# Patient Record
Sex: FEMALE | Race: AMERICAN INDIAN OR ALASKA NATIVE | Employment: FULL TIME | URBAN - METROPOLITAN AREA
[De-identification: names, ages, dates, MRNs, and addresses within clinical notes are randomized per-mention and may not be internally consistent; named-entity substitution may affect disease eponyms.]

---

## 2017-03-22 ENCOUNTER — TELEPHONE (OUTPATIENT)
Dept: FAMILY MEDICINE CLINIC | Age: 52
End: 2017-03-22

## 2017-03-22 ENCOUNTER — PATIENT OUTREACH (OUTPATIENT)
Dept: FAMILY MEDICINE CLINIC | Age: 52
End: 2017-03-22

## 2017-03-22 DIAGNOSIS — Z12.11 COLON CANCER SCREENING: Primary | ICD-10-CM

## 2017-03-22 DIAGNOSIS — Z12.39 BREAST CANCER SCREENING: Primary | ICD-10-CM

## 2017-03-22 NOTE — PROGRESS NOTES
[facility] Screenings:    Discussed overdue health screenings as CRC, Breast CA and cervical. Mrs. Karthik Farrar agrees to proceed with all three. I have placed referrals for mammogram and colonoscopy and have asked Dr. Lawrence Ray office to schedule a well woman office visit for her pap screening. Mrs. Karthik Farrar does not recall a discussion for FIT testing so we discussed the pros and cons of colonoscopy and FIT and she wishes to proceed with colonoscopy.

## 2017-04-04 ENCOUNTER — TELEPHONE (OUTPATIENT)
Dept: FAMILY MEDICINE CLINIC | Age: 52
End: 2017-04-04

## 2017-04-04 NOTE — TELEPHONE ENCOUNTER
The patients imaging at the [facility] needs to changed from a screening to a diagnostic

## 2017-04-06 NOTE — TELEPHONE ENCOUNTER
Cannot order a diagnostic mammogram without hx of abnormal study or mass.      Antoinette Rios MD  4/6/2017  11:01 AM

## 2017-04-07 ENCOUNTER — TELEPHONE (OUTPATIENT)
Dept: FAMILY MEDICINE CLINIC | Age: 52
End: 2017-04-07

## 2017-04-10 ENCOUNTER — PATIENT OUTREACH (OUTPATIENT)
Dept: FAMILY MEDICINE CLINIC | Age: 52
End: 2017-04-10

## 2017-04-10 NOTE — PROGRESS NOTES
[facility] Health SCreenings:    Was scheduled for health screenings last week but had to cancel due to death in the family. Gave her number to Dr. Josette Mtz office to rechedule nurse visit/colonoscopy, # for scheduling dept for mammogram and she will call Dr. Tila Vargas office to reschedule for cervical cancer screening.

## 2017-05-03 ENCOUNTER — PATIENT OUTREACH (OUTPATIENT)
Dept: FAMILY MEDICINE CLINIC | Age: 52
End: 2017-05-03

## 2017-05-03 NOTE — PROGRESS NOTES
[facility] Screening: Follow up on cervical/colon/breast cancer screenings. Has not made appointments for any. Stated her renovation project should be finishing up this week and her father's head stone that was suddenly missing has been shipped finally. Gave her all numbers to schedule her appointments, appropriate referrals have been placed. We agreed I would call in two weeks to f/u again.

## 2017-05-11 ENCOUNTER — OFFICE VISIT (OUTPATIENT)
Dept: FAMILY MEDICINE CLINIC | Age: 52
End: 2017-05-11

## 2017-05-11 VITALS
OXYGEN SATURATION: 99 % | HEIGHT: 64 IN | WEIGHT: 132 LBS | SYSTOLIC BLOOD PRESSURE: 138 MMHG | BODY MASS INDEX: 22.53 KG/M2 | DIASTOLIC BLOOD PRESSURE: 85 MMHG | TEMPERATURE: 98.1 F | RESPIRATION RATE: 18 BRPM | HEART RATE: 54 BPM

## 2017-05-11 DIAGNOSIS — I10 ESSENTIAL HYPERTENSION: Primary | Chronic | ICD-10-CM

## 2017-05-11 DIAGNOSIS — K08.89 PAIN, DENTAL: ICD-10-CM

## 2017-05-11 NOTE — PROGRESS NOTES
Chief Complaint   Patient presents with    Hypertension     pt states she was in the process of having tooth extraction and blood pressure went up

## 2017-05-11 NOTE — PROGRESS NOTES
Acute Care Visit    Today's Date:  2017   Patient's Name: Chloe Freeman   Patient's :  1965     History:     Chief Complaint   Patient presents with    Hypertension     pt states she was in the process of having tooth extraction and blood pressure went up       Chloe Freeman is a 46 y.o. female presenting for Acute Care. BP elevated during recent tooth extraction she presents today to be evaluated to see if she is stable to proceed with tooth extraction. Hypertension    BP today is at goal and less [id number]. Patients risk factors include: none  Patient is not checking home BP    Reports compliance and denies side effects to medications  Daily exercise and diet are as follows: reports staying active and trying to eat healthy  Weight is stable  Takes the below meds regularly with no side effects.    Last LDL: 86          Past Medical History:   Diagnosis Date    Abnormal mammogram 3/2016    BIRADS 3 right breast    Hypertension     Stroke ([facility] [address] 2016    CVA     Past Surgical History:   Procedure Laterality Date    HX GYN            Social History     Social History    Marital status:      Spouse name: N/A    Number of children: N/A    Years of education: N/A     Social History Main Topics    Smoking status: Former Smoker     Quit date: 1999    Smokeless tobacco: Never Used    Alcohol use No    Drug use: No    Sexual activity: Yes     Partners: Male     Birth control/ protection: Condom     Other Topics Concern    None     Social History Narrative     Family History   Problem Relation Age of Onset    No Known Problems Mother     No Known Problems Father     No Known Problems Sister     No Known Problems Brother     No Known Problems Maternal Aunt     No Known Problems Maternal Uncle     No Known Problems Paternal Aunt     No Known Problems Paternal Uncle     No Known Problems Maternal Grandmother     No Known Problems Paternal Grandmother     No Known Problems Paternal Grandfather     No Known Problems Other      No Known Allergies    Problem List:      Patient Active Problem List   Diagnosis Code    Hypertension I10    Back pain M54.9    Numbness and tingling R20.0, R20.2    [facility]       Medications:     Current Outpatient Prescriptions   Medication Sig    gabapentin (NEURONTIN) 100 mg capsule 1 twice a day for 2 days then 1 three times a day for three days then 2 three times a day for 7 days then 3 three times a day    amitriptyline (ELAVIL) 25 mg tablet Take 1 Tab by mouth nightly.  oxyCODONE-acetaminophen (PERCOCET) 5-325 mg per tablet Take 1 Tab by mouth every four (4) hours as needed for Pain. Max Daily Amount: 6 Tabs.  cyclobenzaprine (FLEXERIL) 10 mg tablet Take 1 Tab by mouth three (3) times daily as needed for Muscle Spasm(s) for up to 285 days.  topiramate (TOPAMAX) 50 mg tablet Take 1 Tab by mouth two (2) times a day.  butalbital-aspirin-caffeine (FIORINAL) capsule Take 1 Cap by mouth every four (4) hours as needed (headache). Max Daily Amount: 6 Caps.  metoprolol tartrate (LOPRESSOR) 100 mg IR tablet Take 1 Tab by mouth two (2) times a day.  amLODIPine (NORVASC) 10 mg tablet Take 1 Tab by mouth daily. Indications: HYPERTENSION    hydrochlorothiazide (HYDRODIURIL) 12.5 mg tablet Take 1 Tab by mouth daily. Dosage unknown    lisinopril (PRINIVIL, ZESTRIL) 40 mg tablet Take 1 Tab by mouth daily.  ibuprofen (MOTRIN) 800 mg tablet Take 1 Tab by mouth every eight (8) hours as needed for Pain.  hydrALAZINE (APRESOLINE) 25 mg tablet Take 1 Tab by mouth three (3) times daily.  citalopram (CELEXA) 10 mg tablet Take 1 Tab by mouth daily. No current facility-administered medications for this visit.           Constitutional: negative for fevers, chills, sweats and fatigue  Ears, nose, mouth, throat, and face: negative except for sore throat  Respiratory: negative for cough, sputum or wheezing  Cardiovascular: negative for chest pain, chest pressure/discomfort, dyspnea  Gastrointestinal: negative for nausea, vomiting, diarrhea, constipation and abdominal pain    Physical Assessment:   VS:    Vitals:    05/11/17 1256 05/11/17 1317   BP: (!) 159/94 138/85   Pulse: (!) 54    Resp: 18    Temp: 98.1 °F (36.7 °C)    TempSrc: Oral    SpO2: 99%    Weight: 132 lb (59.9 kg)    Height: 5' 4\" (1.626 m)          Lab Results   Component Value Date/Time    Sodium 140 08/02/2016 02:20 AM    Potassium 3.8 08/02/2016 02:20 AM    Chloride 105 08/02/2016 02:20 AM    CO2 26 08/02/2016 02:20 AM    Anion gap 9 08/02/2016 02:20 AM    Glucose 103 08/02/2016 02:20 AM    BUN 12 08/02/2016 02:20 AM    Creatinine 0.79 08/02/2016 02:20 AM    BUN/Creatinine ratio 15 08/02/2016 02:20 AM    GFR est AA >60 08/02/2016 02:20 AM    GFR est non-AA >60 08/02/2016 02:20 AM    Calcium 8.7 08/02/2016 02:20 AM       General:   Well-groomed, well-nourished, in no distress, pleasant, alert, appropriate and conversant. Mouth:  Good dentition, oropharynx WNL without membranes, exudates, petechiae or ulcers  Neck:   Neck supple, no swelling, mass or tenderness, no thyromegaly  Cardiovasc:   RRR, no MRG. Pulses 2+ and symmetric at distal extremities. Pulmonary:   Lungs clear bilaterally. Normal respiratory effort. Abdomen:   Abdomen soft, NT, ND, NAB. Extremities:   No edema, no TTP bilateral calves. LEs warm and well-perfused. Neuro:   Alert and oriented, no focal deficits. No facial asymmetry noted. Skin:    No rashes or jaundice  Psych:  No pressured speech or abnormal thought content        Assessment/Plan & Orders:       1. Essential hypertension    2. Pain, dental        No orders of the defined types were placed in this encounter. HTN during recent tooth extraction patient had elevated -190 most likely due to pain. BP is much improved today.  Recommend patient continue with current meds she is cleared for further in clinic dental procedures. Follow up in 2-3 months    *Plan of care reviewed with patient. Patient in agreement with plan and expresses understanding. All questions answered and patient encouraged to call or RTO if further questions or concerns.     Letitia Mata MD  Family Medicine  5/11/2017  1:00 PM

## 2017-05-18 ENCOUNTER — PATIENT OUTREACH (OUTPATIENT)
Dept: FAMILY MEDICINE CLINIC | Age: 52
End: 2017-05-18

## 2017-05-18 NOTE — PROGRESS NOTES
health Screening:    Mrs. Maggie Lozano is scheduled for her 6 month diagnostic mammogram 5/26/17 and will call today to schedule her cervical cancer screening with Dr. Abelardo Proctor and her initial visit with Dr. Kamila Paredes for colon cancer screening. States she is doing well but things are still busy but promises to call for appointments.

## 2017-05-30 ENCOUNTER — PATIENT OUTREACH (OUTPATIENT)
Dept: FAMILY MEDICINE CLINIC | Age: 52
End: 2017-05-30

## 2017-06-01 ENCOUNTER — OFFICE VISIT (OUTPATIENT)
Dept: FAMILY MEDICINE CLINIC | Age: 52
End: 2017-06-01

## 2017-06-01 VITALS
OXYGEN SATURATION: 100 % | BODY MASS INDEX: 22.36 KG/M2 | TEMPERATURE: 97.5 F | HEART RATE: 72 BPM | WEIGHT: 131 LBS | HEIGHT: 64 IN | RESPIRATION RATE: 20 BRPM | SYSTOLIC BLOOD PRESSURE: 191 MMHG | DIASTOLIC BLOOD PRESSURE: 106 MMHG

## 2017-06-01 DIAGNOSIS — M62.830 MUSCLE SPASM OF BACK: ICD-10-CM

## 2017-06-01 DIAGNOSIS — I10 ESSENTIAL HYPERTENSION WITH GOAL BLOOD PRESSURE LESS THAN 140/90: Chronic | ICD-10-CM

## 2017-06-01 DIAGNOSIS — F41.9 ANXIETY: ICD-10-CM

## 2017-06-01 RX ORDER — AMLODIPINE BESYLATE 10 MG/1
10 TABLET ORAL DAILY
Qty: 90 TAB | Refills: 1 | Status: SHIPPED | OUTPATIENT
Start: 2017-06-01 | End: 2017-09-21 | Stop reason: SDUPTHER

## 2017-06-01 RX ORDER — METOPROLOL TARTRATE 100 MG/1
100 TABLET ORAL 2 TIMES DAILY
Qty: 180 TAB | Refills: 1 | Status: SHIPPED | OUTPATIENT
Start: 2017-06-01 | End: 2017-09-21 | Stop reason: SDUPTHER

## 2017-06-01 RX ORDER — LISINOPRIL 40 MG/1
40 TABLET ORAL DAILY
Qty: 90 TAB | Refills: 1 | Status: SHIPPED | OUTPATIENT
Start: 2017-06-01 | End: 2017-09-21 | Stop reason: SDUPTHER

## 2017-06-01 RX ORDER — CYCLOBENZAPRINE HCL 10 MG
10 TABLET ORAL
Qty: 90 TAB | Refills: 1 | Status: SHIPPED | OUTPATIENT
Start: 2017-06-01 | End: 2018-04-18 | Stop reason: SDUPTHER

## 2017-06-01 RX ORDER — CITALOPRAM 10 MG/1
10 TABLET ORAL DAILY
Qty: 90 TAB | Refills: 1 | Status: SHIPPED | OUTPATIENT
Start: 2017-06-01 | End: 2018-04-18 | Stop reason: SDUPTHER

## 2017-06-01 RX ORDER — HYDROCHLOROTHIAZIDE 12.5 MG/1
12.5 TABLET ORAL DAILY
Qty: 90 TAB | Refills: 1 | Status: SHIPPED | OUTPATIENT
Start: 2017-06-01 | End: 2017-07-19 | Stop reason: DRUGHIGH

## 2017-06-01 RX ORDER — HYDRALAZINE HYDROCHLORIDE 25 MG/1
25 TABLET, FILM COATED ORAL 3 TIMES DAILY
Qty: 270 TAB | Refills: 1 | Status: SHIPPED | OUTPATIENT
Start: 2017-06-01 | End: 2017-09-21 | Stop reason: SDUPTHER

## 2017-06-01 NOTE — PROGRESS NOTES
Chronic Care Visit    Today's Date:  2017   Patient's Name: Jane Tovar   Patient's :  1965     History:     Chief Complaint   Patient presents with    Medication Reaction     pt here with c/o allergic reaction to an antibotic Amoxicillin, had noted a rash to her face, headache, abd pain and nausea has noted since Monday has been on medication for 7 days       Jane Tovar is a 46 y.o. female presenting for Chronic Care    Hypertension    BP today is not at goal and less [id number] no improvements on recheck. Patients risk factors include: none. Patient reports having an argument with roommate this am and thinks that may have elevated her BP. Patient is not checking home BP    Reports compliance and denies side effects to medications  Daily exercise and diet are as follows: reports staying active and trying to eat healthy  Weight is stable  Takes the below meds regularly with no side effects. Last LDL: 86    Depression/Anxiety    Patient denies new stressor  Sypmtoms are stable since last visit. Patient is currently on the following for depression and doing well: celexa. Denies side effects. Patient is not in counseling. Denies Any thoughts of harming herself or others. Denies excessive drug or alcohol use. Patient reports a supportive and safe home environment. Chronic Back Pain    Onset: 6-12 months  Hx of myalgia  Denies history of trauma/injury  Character of Pain: achy/muscle spasms  Denies problem with bowels or urine  Alleviating/Agravating Factors: worse with excessive activity.    Current meds: stable on flexeril prn    Past Medical History:   Diagnosis Date    Abnormal mammogram 3/2016    BIRADS 3 right breast    Hypertension     Stroke [facility]) 2016    CVA     Past Surgical History:   Procedure Laterality Date    HX GYN            Social History     Social History    Marital status:      Spouse name: N/A    Number of children: N/A    Years of education: N/A     Social History Main Topics    Smoking status: Former Smoker     Quit date: 11/16/1999    Smokeless tobacco: Never Used    Alcohol use No    Drug use: No    Sexual activity: Yes     Partners: Male     Birth control/ protection: Condom     Other Topics Concern    None     Social History Narrative     Family History   Problem Relation Age of Onset    No Known Problems Mother     No Known Problems Father     No Known Problems Sister     No Known Problems Brother     No Known Problems Maternal Aunt     No Known Problems Maternal Uncle     No Known Problems Paternal Aunt     No Known Problems Paternal Uncle     No Known Problems Maternal Grandmother     No Known Problems Paternal Grandmother     No Known Problems Paternal Grandfather     No Known Problems Other      No Known Allergies    Problem List:      Patient Active Problem List   Diagnosis Code    Hypertension I10    Back pain M54.9    Numbness and tingling R20.0, R20.2    [facility]       Medications:     Current Outpatient Prescriptions   Medication Sig    amLODIPine (NORVASC) 10 mg tablet Take 1 Tab by mouth daily. Indications: hypertension    hydroCHLOROthiazide (HYDRODIURIL) 12.5 mg tablet Take 1 Tab by mouth daily. Dosage unknown    cyclobenzaprine (FLEXERIL) 10 mg tablet Take 1 Tab by mouth three (3) times daily as needed for Muscle Spasm(s) for up to 285 days.  metoprolol tartrate (LOPRESSOR) 100 mg IR tablet Take 1 Tab by mouth two (2) times a day.  lisinopril (PRINIVIL, ZESTRIL) 40 mg tablet Take 1 Tab by mouth daily.  citalopram (CELEXA) 10 mg tablet Take 1 Tab by mouth daily.  hydrALAZINE (APRESOLINE) 25 mg tablet Take 1 Tab by mouth three (3) times daily.  amitriptyline (ELAVIL) 25 mg tablet Take 1 Tab by mouth nightly.  butalbital-aspirin-caffeine (FIORINAL) capsule Take 1 Cap by mouth every four (4) hours as needed (headache). Max Daily Amount: 6 Caps.     gabapentin (NEURONTIN) 100 mg capsule 1 twice a day for 2 days then 1 three times a day for three days then 2 three times a day for 7 days then 3 three times a day    oxyCODONE-acetaminophen (PERCOCET) 5-325 mg per tablet Take 1 Tab by mouth every four (4) hours as needed for Pain. Max Daily Amount: 6 Tabs.  topiramate (TOPAMAX) 50 mg tablet Take 1 Tab by mouth two (2) times a day.  ibuprofen (MOTRIN) 800 mg tablet Take 1 Tab by mouth every eight (8) hours as needed for Pain. No current facility-administered medications for this visit. Constitutional: negative for fevers, chills, sweats and fatigue  Ears, nose, mouth, throat, and face: negative except for sore throat  Respiratory: negative for cough, sputum or wheezing  Cardiovascular: negative for chest pain, chest pressure/discomfort, dyspnea  Gastrointestinal: negative for nausea, vomiting, diarrhea, constipation and abdominal pain   Neuro: positive for headache negative for dizziness  Musculo: negative myalgia or arthalgia    Physical Assessment:   VS:    Vitals:    06/01/17 0900   BP: (!) 191/106   Pulse: 72   Resp: 20   Temp: 97.5 °F (36.4 °C)   TempSrc: Oral   SpO2: 100%   Weight: 131 lb (59.4 kg)   Height: 5' 4\" (1.626 m)         Lab Results   Component Value Date/Time    Sodium 140 08/02/2016 02:20 AM    Potassium 3.8 08/02/2016 02:20 AM    Chloride 105 08/02/2016 02:20 AM    CO2 26 08/02/2016 02:20 AM    Anion gap 9 08/02/2016 02:20 AM    Glucose 103 08/02/2016 02:20 AM    BUN 12 08/02/2016 02:20 AM    Creatinine 0.79 08/02/2016 02:20 AM    BUN/Creatinine ratio 15 08/02/2016 02:20 AM    GFR est AA >60 08/02/2016 02:20 AM    GFR est non-AA >60 08/02/2016 02:20 AM    Calcium 8.7 08/02/2016 02:20 AM       General:   Well-groomed, well-nourished, in no distress, pleasant, alert, appropriate and conversant.    Mouth:  Good dentition, oropharynx WNL without membranes, exudates, petechiae or ulcers  Neck:   Neck supple, no swelling, mass or tenderness, no thyromegaly  Cardiovasc:   RRR, no MRG. Pulses 2+ and symmetric at distal extremities. Pulmonary:   Lungs clear bilaterally. Normal respiratory effort. Abdomen:   Abdomen soft, NT, ND, NAB. Extremities:   No edema, no TTP bilateral calves. LEs warm and well-perfused. Neuro:   Alert and oriented, no focal deficits. No facial asymmetry noted. Skin:    No rashes or jaundice  Psych:  No pressured speech or abnormal thought content        Assessment/Plan & Orders:       1. Essential hypertension with goal blood pressure less than 140/90    2. Muscle spasm of back    3. Anxiety        Orders Placed This Encounter    LIPID PANEL    METABOLIC PANEL, COMPREHENSIVE    amLODIPine (NORVASC) 10 mg tablet    hydroCHLOROthiazide (HYDRODIURIL) 12.5 mg tablet    cyclobenzaprine (FLEXERIL) 10 mg tablet    metoprolol tartrate (LOPRESSOR) 100 mg IR tablet    lisinopril (PRINIVIL, ZESTRIL) 40 mg tablet    citalopram (CELEXA) 10 mg tablet    hydrALAZINE (APRESOLINE) 25 mg tablet       HTN BP very elevated no improvements on recheck. Patient reports compliance discussed side effects of uncontrolled BP. Recommend return in 1 week for nurse visit BP check. BP was at goal last visit so hopefully this is an isolated reading. Myalgia will refill flexeril  Depression/Anxiety will refill celexa    Follow up in 2-3 months Chronic care    *Plan of care reviewed with patient. Patient in agreement with plan and expresses understanding. All questions answered and patient encouraged to call or RTO if further questions or concerns.     Ronit Wiseman MD  Family Medicine  6/1/2017  9:10 AM

## 2017-06-01 NOTE — PATIENT INSTRUCTIONS

## 2017-06-01 NOTE — MR AVS SNAPSHOT
Visit Information Date & Time Provider Department Dept. Phone Encounter #  
 6/1/2017  8:45 AM Vishal Stringer [phone] [id number] Follow-up Instructions Return in about 4 weeks (around 6/29/2017), or if symptoms worsen or fail to improve. Your Appointments 6/8/2017 12:45 PM  
Nurse Visit with Drew Marlow MD  
[facility]) Appt Note: bp check [address] [facility] [address]  
[facility] [address]  
  
    
 10/5/2017 12:45 PM  
FOLLOW UP EXAM with Drew Marlow MD  
[facility]) Appt Note: 4 month f/u  
 [address] [facility] [address]  
[facility] [address] Upcoming Health Maintenance Date Due DTaP/Tdap/Td series (1 - Tdap) 7/27/1986 PAP AKA CERVICAL CYTOLOGY 7/27/1986 FOBT Q 1 YEAR AGE 50-75 3/2/2017 BREAST CANCER SCRN MAMMOGRAM 4/7/2017 INFLUENZA AGE 9 TO ADULT 8/1/2017 Allergies as of 6/1/2017  Review Complete On: 6/1/2017 By: Drwe Marlow MD  
 No Known Allergies Current Immunizations  Reviewed on 8/1/2016 No immunizations on file. Not reviewed this visit You Were Diagnosed With   
  
 Codes Comments Essential hypertension with goal blood pressure less than 140/90     ICD-10-CM: I10 
ICD-9-CM: 401.9 Muscle spasm of back     ICD-10-CM: F48.869 ICD-9-CM: 724.8 Anxiety     ICD-10-CM: F41.9 ICD-9-CM: 300.00 Vitals BP Pulse Temp Resp Height(growth percentile) Weight(growth percentile) (!) 191/106 (BP 1 Location: Left arm, BP Patient Position: Sitting) 72 97.5 °F (36.4 °C) (Oral) 20 5' 4\" (1.626 m) 131 lb (59.4 kg) LMP SpO2 BMI OB Status Smoking Status 05/10/2017 100% 22.49 kg/m2 Having regular periods Former Smoker Vitals History BMI and BSA Data Body Mass Index Body Surface Area  
 22.49 kg/m 2 1.64 m 2 Preferred Pharmacy Pharmacy Name Phone [facility] [address] [place], [place] [phone] Your Updated Medication List  
  
   
This list is accurate as of: 6/1/17  9:27 AM.  Always use your most recent med list.  
  
  
  
  
 amitriptyline 25 mg tablet Commonly known as:  ELAVIL Take 1 Tab by mouth nightly. amLODIPine 10 mg tablet Commonly known as:  Natty Coop Take 1 Tab by mouth daily. Indications: hypertension  
  
 butalbital-aspirin-caffeine capsule Commonly known as:  Loren Carreno Take 1 Cap by mouth every four (4) hours as needed (headache). Max Daily Amount: 6 Caps. citalopram 10 mg tablet Commonly known as:  Orbie Curls Take 1 Tab by mouth daily. cyclobenzaprine 10 mg tablet Commonly known as:  FLEXERIL Take 1 Tab by mouth three (3) times daily as needed for Muscle Spasm(s) for up to 285 days. gabapentin 100 mg capsule Commonly known as:  NEURONTIN  
1 twice a day for 2 days then 1 three times a day for three days then 2 three times a day for 7 days then 3 three times a day  
  
 hydrALAZINE 25 mg tablet Commonly known as:  APRESOLINE Take 1 Tab by mouth three (3) times daily. hydroCHLOROthiazide 12.5 mg tablet Commonly known as:  HYDRODIURIL Take 1 Tab by mouth daily. Dosage unknown  
  
 ibuprofen 800 mg tablet Commonly known as:  MOTRIN Take 1 Tab by mouth every eight (8) hours as needed for Pain. lisinopril 40 mg tablet Commonly known as:  Emile Apo Take 1 Tab by mouth daily. metoprolol tartrate 100 mg IR tablet Commonly known as:  LOPRESSOR Take 1 Tab by mouth two (2) times a day. oxyCODONE-acetaminophen 5-325 mg per tablet Commonly known as:  PERCOCET Take 1 Tab by mouth every four (4) hours as needed for Pain. Max Daily Amount: 6 Tabs. topiramate 50 mg tablet Commonly known as:  TOPAMAX Take 1 Tab by mouth two (2) times a day. Prescriptions Sent to Pharmacy Refills  
 amLODIPine (NORVASC) 10 mg tablet 1 Sig: Take 1 Tab by mouth daily. Indications: hypertension Class: Normal  
 Pharmacy: [address], [place] [address]. Ph #: [phone] Route: Oral  
 hydroCHLOROthiazide (HYDRODIURIL) 12.5 mg tablet 1 Sig: Take 1 Tab by mouth daily. Dosage unknown Class: Normal  
 Pharmacy: [address], [place] [address]. Ph #: [phone] Route: Oral  
 cyclobenzaprine (FLEXERIL) 10 mg tablet 1 Sig: Take 1 Tab by mouth three (3) times daily as needed for Muscle Spasm(s) for up to 285 days. Class: Normal  
 Pharmacy: [address], [place] [address]. Ph #: [phone] Route: Oral  
 metoprolol tartrate (LOPRESSOR) 100 mg IR tablet 1 Sig: Take 1 Tab by mouth two (2) times a day. Class: Normal  
 Pharmacy: [address], [place] [address]. Ph #: [phone] Route: Oral  
 lisinopril (PRINIVIL, ZESTRIL) 40 mg tablet 1 Sig: Take 1 Tab by mouth daily. Class: Normal  
 Pharmacy: [address], [place] [address]. Ph #: [phone] Route: Oral  
 citalopram (CELEXA) 10 mg tablet 1 Sig: Take 1 Tab by mouth daily. Class: Normal  
 Pharmacy: [address], [place] [address]. Ph #: [phone] Route: Oral  
 hydrALAZINE (APRESOLINE) 25 mg tablet 1 Sig: Take 1 Tab by mouth three (3) times daily. Class: Normal  
 Pharmacy: [facility] [address] [facility], [place] [address].  #: [phone] Route: Oral  
  
Follow-up Instructions Return in about 4 weeks (around 6/29/2017), or if symptoms worsen or fail to improve. To-Do List   
 06/01/2017 Lab:  LIPID PANEL   
  
 06/01/2017   Lab:  METABOLIC PANEL, COMPREHENSIVE   
  
  
 Patient Instructions High Blood Pressure: Care Instructions Your Care Instructions If your blood pressure is usually above 140/90, you have high blood pressure, or hypertension. That means the top number is 140 or higher or the bottom number is 90 or higher, or both. Despite what a lot of people think, high blood pressure usually doesn't cause headaches or make you feel dizzy or lightheaded. It usually has no symptoms. But it does increase your risk for heart attack, stroke, and kidney or eye damage. The higher your blood pressure, the more your risk increases. Your doctor will give you a goal for your blood pressure. Your goal will be based on your health and your age. An example of a goal is to keep your blood pressure below 140/90. Lifestyle changes, such as eating healthy and being active, are always important to help lower blood pressure. You might also take medicine to reach your blood pressure goal. 
Follow-up care is a key part of your treatment and safety. Be sure to make and go to all appointments, and call your doctor if you are having problems. It's also a good idea to know your test results and keep a list of the medicines you take. How can you care for yourself at home? Medical treatment · If you stop taking your medicine, your blood pressure will go back up. You may take one or more types of medicine to lower your blood pressure. Be safe with medicines. Take your medicine exactly as prescribed. Call your doctor if you think you are having a problem with your medicine. · Talk to your doctor before you start taking aspirin every day. Aspirin can help certain people lower their risk of a heart attack or stroke. But taking aspirin isn't right for everyone, because it can cause serious bleeding. · See your doctor regularly. You may need to see the doctor more often at first or until your blood pressure comes down.  
· If you are taking blood pressure medicine, talk to your doctor before you take decongestants or anti-inflammatory medicine, such as ibuprofen. Some of these medicines can raise blood pressure. · Learn how to check your blood pressure at home. Lifestyle changes · Stay at a healthy weight. This is especially important if you put on weight around the waist. Losing even 10 pounds can help you lower your blood pressure. · If your doctor recommends it, get more exercise. Walking is a good choice. Bit by bit, increase the amount you walk every day. Try for at least 30 minutes on most days of the week. You also may want to swim, bike, or do other activities. · Avoid or limit alcohol. Talk to your doctor about whether you can drink any alcohol. · Try to limit how much sodium you eat to less than 2,300 milligrams (mg) a day. Your doctor may ask you to try to eat less than 1,500 mg a day. · Eat plenty of fruits (such as bananas and oranges), vegetables, legumes, whole grains, and low-fat dairy products. · Lower the amount of saturated fat in your diet. Saturated fat is found in animal products such as milk, cheese, and meat. Limiting these foods may help you lose weight and also lower your risk for heart disease. · Do not smoke. Smoking increases your risk for heart attack and stroke. If you need help quitting, talk to your doctor about stop-smoking programs and medicines. These can increase your chances of quitting for good. When should you call for help? Call 911 anytime you think you may need emergency care. This may mean having symptoms that suggest that your blood pressure is causing a serious heart or blood vessel problem. Your blood pressure may be over 180/110. For example, call 911 if: 
· You have symptoms of a heart attack. These may include: ¨ Chest pain or pressure, or a strange feeling in the chest. 
¨ Sweating. ¨ Shortness of breath. ¨ Nausea or vomiting.  
¨ Pain, pressure, or a strange feeling in the back, neck, jaw, or upper belly or in one or both shoulders or arms. ¨ Lightheadedness or sudden weakness. ¨ A fast or irregular heartbeat. · You have symptoms of a stroke. These may include: 
¨ Sudden numbness, tingling, weakness, or loss of movement in your face, arm, or leg, especially on only one side of your body. ¨ Sudden vision changes. ¨ Sudden trouble speaking. ¨ Sudden confusion or trouble understanding simple statements. ¨ Sudden problems with walking or balance. ¨ A sudden, severe headache that is different from past headaches. · You have severe back or belly pain. Do not wait until your blood pressure comes down on its own. Get help right away. Call your doctor now or seek immediate care if: 
· Your blood pressure is much higher than normal (such as 180/110 or higher), but you don't have symptoms. · You think high blood pressure is causing symptoms, such as: ¨ Severe headache. ¨ Blurry vision. Watch closely for changes in your health, and be sure to contact your doctor if: 
· Your blood pressure measures 140/90 or higher at least 2 times. That means the top number is 140 or higher or the bottom number is 90 or higher, or both. · You think you may be having side effects from your blood pressure medicine. · Your blood pressure is usually normal, but it goes above normal at least 2 times. Where can you learn more? Go to http://collette-grecia.info/. Enter S952 in the search box to learn more about \"High Blood Pressure: Care Instructions. \" Current as of: August 8, 2016 Content Version: 11.2 © 1855-0221 [employer]. Care instructions adapted under license by [employer] (which disclaims liability or warranty for this information). If you have questions about a medical condition or this instruction, always ask your healthcare professional. [place] [address] any warranty or liability for your use of this information. Introducing [facility] & HEALTH SERVICES! Dear Ludwig Wong: 
Thank you for requesting a ProxToMe account. Our records indicate that you already have an active ProxToMe account. You can access your account anytime at [email or web address] Did you know that you can access your hospital and ER discharge instructions at any time in ProxToMe? You can also review all of your test results from your hospital stay or ER visit. Additional Information If you have questions, please visit the Frequently Asked Questions section of the ProxToMe website at [email or web address] Remember, ProxToMe is NOT to be used for urgent needs. For medical emergencies, dial 911. Now available from your iPhone and Android! Please provide this summary of care documentation to your next provider. Your primary care clinician is listed as Tenisha Goss. If you have any questions after today's visit, please call [phone].

## 2017-06-02 ENCOUNTER — OFFICE VISIT (OUTPATIENT)
Dept: FAMILY MEDICINE CLINIC | Age: 52
End: 2017-06-02

## 2017-06-02 ENCOUNTER — PATIENT OUTREACH (OUTPATIENT)
Dept: FAMILY MEDICINE CLINIC | Age: 52
End: 2017-06-02

## 2017-06-02 VITALS
TEMPERATURE: 98 F | HEIGHT: 64 IN | DIASTOLIC BLOOD PRESSURE: 84 MMHG | RESPIRATION RATE: 18 BRPM | OXYGEN SATURATION: 99 % | WEIGHT: 129 LBS | SYSTOLIC BLOOD PRESSURE: 143 MMHG | HEART RATE: 57 BPM | BODY MASS INDEX: 22.02 KG/M2

## 2017-06-02 DIAGNOSIS — M54.50 CHRONIC BILATERAL LOW BACK PAIN WITHOUT SCIATICA: Chronic | ICD-10-CM

## 2017-06-02 DIAGNOSIS — G89.29 CHRONIC BILATERAL LOW BACK PAIN WITHOUT SCIATICA: Chronic | ICD-10-CM

## 2017-06-02 RX ORDER — OXYCODONE AND ACETAMINOPHEN 5; 325 MG/1; MG/1
1 TABLET ORAL
Qty: 90 TAB | Refills: 0 | Status: SHIPPED | OUTPATIENT
Start: 2017-06-02 | End: 2017-06-02 | Stop reason: SDUPTHER

## 2017-06-02 RX ORDER — OXYCODONE AND ACETAMINOPHEN 5; 325 MG/1; MG/1
1 TABLET ORAL
Qty: 20 TAB | Refills: 0 | Status: SHIPPED | OUTPATIENT
Start: 2017-06-02 | End: 2017-07-18 | Stop reason: SDUPTHER

## 2017-06-02 NOTE — MR AVS SNAPSHOT
Visit Information Date & Time Provider Department Dept. Phone Encounter #  
 6/2/2017  4:00 PM Amy Nicole MD [facility] [phone] [id number] Your Appointments 6/8/2017 12:45 PM  
Nurse Visit with MD [facility]al [facility]avez [facility]t Note: bp check [address] [facility] [address]  
[facility] [address]  
  
    
 10/5/2017 12:45 PM  
FOLLOW UP EXAM with MD Vishal Chavez [facility] Appt Note: 4 month f/u  
 [address] [facility] [address]  
[facility] [address] Upcoming Health Maintenance Date Due DTaP/Tdap/Td series (1 - Tdap) 7/27/1986 PAP AKA CERVICAL CYTOLOGY 7/27/1986 FOBT Q 1 YEAR AGE 50-75 3/2/2017 BREAST CANCER SCRN MAMMOGRAM 4/7/2017 INFLUENZA AGE 9 TO ADULT 8/1/2017 Allergies as of 6/2/2017  Review Complete On: 6/2/2017 By: Chantelle Bruce No Known Allergies Current Immunizations  Reviewed on 8/1/2016 No immunizations on file. Not reviewed this visit You Were Diagnosed With   
  
 Codes Comments Chronic bilateral low back pain without sciatica     ICD-10-CM: M54.5, G89.29 ICD-9-CM: 724.2, 338.29 Vitals BP Pulse Temp Resp Height(growth percentile) Weight(growth percentile) 143/84 (!) 57 98 °F (36.7 °C) (Oral) 18 5' 4\" (1.626 m) 129 lb (58.5 kg) LMP SpO2 BMI OB Status Smoking Status 05/10/2017 99% 22.14 kg/m2 Having regular periods Former Smoker Vitals History BMI and BSA Data Body Mass Index Body Surface Area  
 22.14 kg/m 2 1.63 m 2 Preferred Pharmacy Pharmacy Name Phone [facility] [address] [place] [address] [phone]. [phone] Your Updated Medication List  
  
   
 This list is accurate as of: 6/2/17  4:28 PM.  Always use your most recent med list.  
  
  
  
  
 amitriptyline 25 mg tablet Commonly known as:  ELAVIL Take 1 Tab by mouth nightly. amLODIPine 10 mg tablet Commonly known as:  Stephania Pineda Take 1 Tab by mouth daily. Indications: hypertension  
  
 butalbital-aspirin-caffeine capsule Commonly known as:  Lester Luis E Take 1 Cap by mouth every four (4) hours as needed (headache). Max Daily Amount: 6 Caps. citalopram 10 mg tablet Commonly known as:  Mack Loop Take 1 Tab by mouth daily. cyclobenzaprine 10 mg tablet Commonly known as:  FLEXERIL Take 1 Tab by mouth three (3) times daily as needed for Muscle Spasm(s) for up to 285 days. gabapentin 100 mg capsule Commonly known as:  NEURONTIN  
1 twice a day for 2 days then 1 three times a day for three days then 2 three times a day for 7 days then 3 three times a day  
  
 hydrALAZINE 25 mg tablet Commonly known as:  APRESOLINE Take 1 Tab by mouth three (3) times daily. hydroCHLOROthiazide 12.5 mg tablet Commonly known as:  HYDRODIURIL Take 1 Tab by mouth daily. Dosage unknown  
  
 ibuprofen 800 mg tablet Commonly known as:  MOTRIN Take 1 Tab by mouth every eight (8) hours as needed for Pain. lisinopril 40 mg tablet Commonly known as:  Sacha Moulder Take 1 Tab by mouth daily. metoprolol tartrate 100 mg IR tablet Commonly known as:  LOPRESSOR Take 1 Tab by mouth two (2) times a day. oxyCODONE-acetaminophen 5-325 mg per tablet Commonly known as:  PERCOCET Take 1 Tab by mouth every eight (8) hours as needed for Pain. Max Daily Amount: 3 Tabs. topiramate 50 mg tablet Commonly known as:  TOPAMAX Take 1 Tab by mouth two (2) times a day. Prescriptions Printed Refills  
 oxyCODONE-acetaminophen (PERCOCET) 5-325 mg per tablet 0 Sig: Take 1 Tab by mouth every eight (8) hours as needed for Pain.  Max Daily Amount: 3 Tabs. Class: Print Route: Oral  
  
Introducing [facility] & HEALTH SERVICES! Dear [employer]: 
Thank you for requesting a Zola Books account. Our records indicate that you already have an active Zola Books account. You can access your account anytime at [email or web address] Did you know that you can access your hospital and ER discharge instructions at any time in Zola Books? You can also review all of your test results from your hospital stay or ER visit. Additional Information If you have questions, please visit the Frequently Asked Questions section of the Zola Books website at [email or web address] Remember, Zola Books is NOT to be used for urgent needs. For medical emergencies, dial 911. Now available from your iPhone and Android! Please provide this summary of care documentation to your next provider. Your primary care clinician is listed as Yaima Corbett. If you have any questions after today's visit, please call [phone].

## 2017-06-02 NOTE — PROGRESS NOTES
Chronic Care Visit    Today's Date:  2017   Patient's Name: Ishmael Mcfarlane   Patient's :  1965     History:     Chief Complaint   Patient presents with    Medication Refill     pt presents for refill for pain medication Oxycodone for muscle spasms and headaches        Ishmael Mcfarlane is a 46 y.o. female presenting for Chronic Care    Hypertension    BP today is not at goal and less esdr157/90 but greatly improved since last visit. Patients risk factors include: none. Patient is not checking home BP    Reports compliance and denies side effects to medications  Daily exercise and diet are as follows: reports staying active and trying to eat healthy  Weight is stable  Takes the below meds regularly with no side effects. Last LDL: 86    Depression/Anxiety    Patient denies new stressors  Sypmtoms are stable since last visit. Patient is currently on the following for depression and doing well: celexa. Denies side effects. Patient is not in counseling. Denies Any thoughts of harming herself or others. Denies excessive drug or alcohol use. Patient reports a supportive and safe home environment. Chronic Back Pain    No changes in pain requesting a refill on percocet today. She takes the percocet prn not on a daily basis. Onset: 6-12 months  Hx of myalgia  Denies history of trauma/injury  Character of Pain: achy/muscle spasms  Denies problem with bowels or urine  Alleviating/Agravating Factors: worse with excessive activity.    Current meds: stable on flexeril prn    Past Medical History:   Diagnosis Date    Abnormal mammogram 3/2016    BIRADS 3 right breast    Hypertension     Stroke ([facility] [address] 2016    CVA     Past Surgical History:   Procedure Laterality Date    HX GYN            Social History     Social History    Marital status:      Spouse name: N/A    Number of children: N/A    Years of education: N/A     Social History Main Topics    Smoking status: Former Smoker     Quit date: 11/16/1999    Smokeless tobacco: Never Used    Alcohol use No    Drug use: No    Sexual activity: Yes     Partners: Male     Birth control/ protection: Condom     Other Topics Concern    None     Social History Narrative     Family History   Problem Relation Age of Onset    No Known Problems Mother     No Known Problems Father     No Known Problems Sister     No Known Problems Brother     No Known Problems Maternal Aunt     No Known Problems Maternal Uncle     No Known Problems Paternal Aunt     No Known Problems Paternal Uncle     No Known Problems Maternal Grandmother     No Known Problems Paternal Grandmother     No Known Problems Paternal Grandfather     No Known Problems Other      No Known Allergies    Problem List:      Patient Active Problem List   Diagnosis Code    Hypertension I10    Back pain M54.9    Numbness and tingling R20.0, R20.2    [facility] I63.9       Medications:     Current Outpatient Prescriptions   Medication Sig    oxyCODONE-acetaminophen (PERCOCET) 5-325 mg per tablet Take 1 Tab by mouth every eight (8) hours as needed for Pain. Max Daily Amount: 3 Tabs.  amLODIPine (NORVASC) 10 mg tablet Take 1 Tab by mouth daily. Indications: hypertension    hydroCHLOROthiazide (HYDRODIURIL) 12.5 mg tablet Take 1 Tab by mouth daily. Dosage unknown    cyclobenzaprine (FLEXERIL) 10 mg tablet Take 1 Tab by mouth three (3) times daily as needed for Muscle Spasm(s) for up to 285 days.  metoprolol tartrate (LOPRESSOR) 100 mg IR tablet Take 1 Tab by mouth two (2) times a day.  lisinopril (PRINIVIL, ZESTRIL) 40 mg tablet Take 1 Tab by mouth daily.  citalopram (CELEXA) 10 mg tablet Take 1 Tab by mouth daily.  hydrALAZINE (APRESOLINE) 25 mg tablet Take 1 Tab by mouth three (3) times daily.     gabapentin (NEURONTIN) 100 mg capsule 1 twice a day for 2 days then 1 three times a day for three days then 2 three times a day for 7 days then 3 three times a day    amitriptyline (ELAVIL) 25 mg tablet Take 1 Tab by mouth nightly.  topiramate (TOPAMAX) 50 mg tablet Take 1 Tab by mouth two (2) times a day.  ibuprofen (MOTRIN) 800 mg tablet Take 1 Tab by mouth every eight (8) hours as needed for Pain.  butalbital-aspirin-caffeine (FIORINAL) capsule Take 1 Cap by mouth every four (4) hours as needed (headache). Max Daily Amount: 6 Caps. No current facility-administered medications for this visit. Constitutional: negative for fevers, chills, sweats and fatigue  Ears, nose, mouth, throat, and face: negative except for sore throat  Respiratory: negative for cough, sputum or wheezing  Cardiovascular: negative for chest pain, chest pressure/discomfort, dyspnea  Gastrointestinal: negative for nausea, vomiting, diarrhea, constipation and abdominal pain   Neuro: positive for headache negative for dizziness  Musculo: negative myalgia or arthalgia    Physical Assessment:   VS:    Vitals:    06/02/17 1603   BP: 143/84   Pulse: (!) 57   Resp: 18   Temp: 98 °F (36.7 °C)   TempSrc: Oral   SpO2: 99%   Weight: 129 lb (58.5 kg)   Height: 5' 4\" (1.626 m)         Lab Results   Component Value Date/Time    Sodium 140 08/02/2016 02:20 AM    Potassium 3.8 08/02/2016 02:20 AM    Chloride 105 08/02/2016 02:20 AM    CO2 26 08/02/2016 02:20 AM    Anion gap 9 08/02/2016 02:20 AM    Glucose 103 08/02/2016 02:20 AM    BUN 12 08/02/2016 02:20 AM    Creatinine 0.79 08/02/2016 02:20 AM    BUN/Creatinine ratio 15 08/02/2016 02:20 AM    GFR est AA >60 08/02/2016 02:20 AM    GFR est non-AA >60 08/02/2016 02:20 AM    Calcium 8.7 08/02/2016 02:20 AM       General:   Well-groomed, well-nourished, in no distress, pleasant, alert, appropriate and conversant. Mouth:  Good dentition, oropharynx WNL without membranes, exudates, petechiae or ulcers  Neck:   Neck supple, no swelling, mass or tenderness, no thyromegaly  Cardiovasc:   RRR, no MRG.   Pulses 2+ and symmetric at distal extremities. Pulmonary:   Lungs clear bilaterally. Normal respiratory effort. Abdomen:   Abdomen soft, NT, ND, NAB. Extremities:   No edema, no TTP bilateral calves. LEs warm and well-perfused. Neuro:   Alert and oriented, no focal deficits. No facial asymmetry noted. Skin:    No rashes or jaundice  Psych:  No pressured speech or abnormal thought content        Assessment/Plan & Orders:       1. Chronic bilateral low back pain without sciatica        Orders Placed This Encounter    DISCONTD: oxyCODONE-acetaminophen (PERCOCET) 5-325 mg per tablet    oxyCODONE-acetaminophen (PERCOCET) 5-325 mg per tablet       HTN BP only mildly elevated today and almost at goal   Myalgia flexeril refilled and percocet 7 day supply given  Depression/Anxiety stable celexa    Follow up in 3-6 months    *Plan of care reviewed with patient. Patient in agreement with plan and expresses understanding. All questions answered and patient encouraged to call or RTO if further questions or concerns.     Harvinder Anders MD  Family Medicine  6/2/2017  4:27 PM

## 2017-06-26 ENCOUNTER — OFFICE VISIT (OUTPATIENT)
Dept: FAMILY MEDICINE CLINIC | Age: 52
End: 2017-06-26

## 2017-06-26 ENCOUNTER — HOSPITAL ENCOUNTER (OUTPATIENT)
Dept: GENERAL RADIOLOGY | Age: 52
Discharge: HOME | End: 2017-06-26
Payer: COMMERCIAL

## 2017-06-26 VITALS
WEIGHT: 131.8 LBS | BODY MASS INDEX: 22.5 KG/M2 | RESPIRATION RATE: 18 BRPM | OXYGEN SATURATION: 99 % | SYSTOLIC BLOOD PRESSURE: 167 MMHG | DIASTOLIC BLOOD PRESSURE: 100 MMHG | TEMPERATURE: 97.7 F | HEIGHT: 64 IN | HEART RATE: 76 BPM

## 2017-06-26 DIAGNOSIS — M25.572 TOE JOINT PAIN, LEFT: ICD-10-CM

## 2017-06-26 DIAGNOSIS — M25.572 TOE JOINT PAIN, LEFT: Primary | ICD-10-CM

## 2017-06-26 PROCEDURE — 73660 X-RAY EXAM OF TOE(S): CPT

## 2017-06-26 NOTE — PROGRESS NOTES
Patient: Haroldine Severe MRN: [id number]  SSN: [id number]    YOB: 1965  Age: 46 y.o. Sex: female      Date of Service: 6/26/2017   Provider: Britton Coy   Office Location:   [address] [place] [facility], [address] [place] [address], [address]  Office Phone: [phone]  Office Fax: [phone]        REASON FOR VISIT:   Chief Complaint   Patient presents with    Foot Pain     left foot injury two weeks ago and it is still hurting and has some swelling, has tried OTC medication Motrin that is not helping        VITALS:   Visit Vitals    BP (!) 167/100 (BP 1 Location: Right arm, BP Patient Position: Sitting)    Pulse 76    Temp 97.7 °F (36.5 °C) (Oral)    Resp 18    Ht 5' 4\" (1.626 m)    Wt 131 lb 12.8 oz (59.8 kg)    LMP 06/04/2017 (Exact Date)    SpO2 99%    BMI 22.62 kg/m2       MEDICATIONS:   Current Outpatient Prescriptions   Medication Sig Dispense Refill    oxyCODONE-acetaminophen (PERCOCET) 5-325 mg per tablet Take 1 Tab by mouth every eight (8) hours as needed for Pain. Max Daily Amount: 3 Tabs. 20 Tab 0    amLODIPine (NORVASC) 10 mg tablet Take 1 Tab by mouth daily. Indications: hypertension 90 Tab 1    hydroCHLOROthiazide (HYDRODIURIL) 12.5 mg tablet Take 1 Tab by mouth daily. Dosage unknown 90 Tab 1    cyclobenzaprine (FLEXERIL) 10 mg tablet Take 1 Tab by mouth three (3) times daily as needed for Muscle Spasm(s) for up to 285 days. 90 Tab 1    metoprolol tartrate (LOPRESSOR) 100 mg IR tablet Take 1 Tab by mouth two (2) times a day. 180 Tab 1    lisinopril (PRINIVIL, ZESTRIL) 40 mg tablet Take 1 Tab by mouth daily. 90 Tab 1    citalopram (CELEXA) 10 mg tablet Take 1 Tab by mouth daily. 90 Tab 1    hydrALAZINE (APRESOLINE) 25 mg tablet Take 1 Tab by mouth three (3) times daily.  270 Tab 1    gabapentin (NEURONTIN) 100 mg capsule 1 twice a day for 2 days then 1 three times a day for three days then 2 three times a day for 7 days then 3 three times a day 210 Cap 1    amitriptyline (ELAVIL) 25 mg tablet Take 1 Tab by mouth nightly. 30 Tab 3    topiramate (TOPAMAX) 50 mg tablet Take 1 Tab by mouth two (2) times a day. 60 Tab 3    butalbital-aspirin-caffeine (FIORINAL) capsule Take 1 Cap by mouth every four (4) hours as needed (headache). Max Daily Amount: 6 Caps. 30 Cap 0    ibuprofen (MOTRIN) 800 mg tablet Take 1 Tab by mouth every eight (8) hours as needed for Pain. 180 Tab 0        ALLERGIES:   No Known Allergies     ACTIVE MEDICAL PROBLEMS:  Patient Active Problem List   Diagnosis Code    Hypertension I10    Back pain M54.9    Numbness and tingling R20.0, R20.2    Acute cerebral infarction [facility]) I63.9        MEDICAL/SURGICAL HISTORY:  Past Medical History:   Diagnosis Date    Abnormal mammogram 3/2016    BIRADS 3 right breast    Hypertension     Stroke ([facility] [address] 2016    CVA      Past Surgical History:   Procedure Laterality Date    HX GYN  1982             FAMILY HISTORY:  Family History   Problem Relation Age of Onset    No Known Problems Mother     No Known Problems Father     No Known Problems Sister     No Known Problems Brother     No Known Problems Maternal Aunt     No Known Problems Maternal Uncle     No Known Problems Paternal Aunt     No Known Problems Paternal Uncle     No Known Problems Maternal Grandmother     No Known Problems Paternal Grandmother     No Known Problems Paternal Grandfather     No Known Problems Other         SOCIAL HISTORY:  Social History   Substance Use Topics    Smoking status: Former Smoker     Quit date: 1999    Smokeless tobacco: Never Used    Alcohol use No          HISTORY OF PRESENT ILLNESS:   Guido Travis is a 46 y.o. female who presents to the office for acute care. PCP: Dr. Carmelo Barcenas. Patient complains of left 5th toe pain x about 2 weeks. Reports she stubbed her toe on a wood surface about 2 weeks ago and has been experiencing pain ever since.  Pain is exacerbated by applying any pressure to the toe, especially while walking and trying to wear a closed toed shoe. She has tried ibuprofen, ice, heat, and epsom salt soaks without much relief. Patient also takes percocet regularly for her back, which does help her toe a little bit. Denies any other joint pains or injury. No numbness, tingling, or color change to the digit. REVIEW OF SYSTEMS:  Review of Systems   Constitutional: Negative for chills and fever. Respiratory: Negative for shortness of breath. Cardiovascular: Negative for chest pain. Musculoskeletal: Positive for joint pain ( left toe). PHYSICAL EXAMINATION:  Physical Exam   Constitutional: She is well-developed, well-nourished, and in no distress. Cardiovascular: Normal rate, regular rhythm and normal heart sounds. Pulmonary/Chest: Effort normal and breath sounds normal. She has no wheezes. She has no rales. Musculoskeletal:   Left 5th toe with mild edema, tender to palpation diffusely. Pain with passive ROM. Feet otherwise unremarkable   Skin: Skin is warm and dry. RESULTS:  No results found for this visit on 06/26/17. ASSESSMENT/PLAN:  Kim was seen today for foot pain. Diagnoses and all orders for this visit:    Toe joint pain, left  - Will order x-ray to assess for fracture   - Discussed with patient that whether or not there is a fracture present, management will likely be the same. Advised continued use of NSAIDs, ice, rest, elevation, and advised patient to ledy tape the affected toe to the adjacent toe for stabilization  -     XR 5TH TOE LT MIN 2 V; Future    Follow up Wednesday as scheduled for Well Woman exam   Return sooner PRN    Patient expresses understanding and is agreeable with the above plan.       PATIENT CARE TEAM:   Patient Care Team:  Allan Red MD as PCP - General (Family Practice)  Jonathan Ramirez MD as Consulting Provider (Neurology)       Zahida Goldberg, [address]   June 26, 2017    12:16 PM

## 2017-06-27 NOTE — PROGRESS NOTES
Please let patient know that her x-ray did confirm that her toe is broken. She should ledy tape it to the adjacent toe as we discussed for the next 4 weeks, and schedule a follow up in about 4 weeks to reassess.

## 2017-07-12 ENCOUNTER — PATIENT OUTREACH (OUTPATIENT)
Dept: FAMILY MEDICINE CLINIC | Age: 52
End: 2017-07-12

## 2017-07-12 NOTE — PROGRESS NOTES
NN health screenings:    Ms Anderson Orr stated she plans to complete her f/u for broken toes she succumb to recently. She does have a pap smear scheduled with Dr. Sarah Lora partner on July 19th and plans to reschedule with Dr. Rocio Ravi and have the mammogram during the month of August. Provided my phone number for questions or if I can help expedite the screenings in any way.

## 2017-07-18 ENCOUNTER — OFFICE VISIT (OUTPATIENT)
Dept: FAMILY MEDICINE CLINIC | Age: 52
End: 2017-07-18

## 2017-07-18 VITALS
RESPIRATION RATE: 20 BRPM | HEART RATE: 58 BPM | OXYGEN SATURATION: 99 % | DIASTOLIC BLOOD PRESSURE: 116 MMHG | SYSTOLIC BLOOD PRESSURE: 191 MMHG | WEIGHT: 136 LBS | BODY MASS INDEX: 23.22 KG/M2 | TEMPERATURE: 97.6 F | HEIGHT: 64 IN

## 2017-07-18 DIAGNOSIS — S92.532D: Primary | ICD-10-CM

## 2017-07-18 DIAGNOSIS — R03.0 ELEVATED BLOOD PRESSURE READING: ICD-10-CM

## 2017-07-18 RX ORDER — OXYCODONE AND ACETAMINOPHEN 5; 325 MG/1; MG/1
1 TABLET ORAL
Qty: 20 TAB | Refills: 0 | Status: SHIPPED | OUTPATIENT
Start: 2017-07-18 | End: 2018-04-18

## 2017-07-18 RX ORDER — IBUPROFEN 800 MG/1
800 TABLET ORAL
Qty: 180 TAB | Refills: 0 | Status: SHIPPED | OUTPATIENT
Start: 2017-07-18 | End: 2018-04-18

## 2017-07-18 NOTE — PROGRESS NOTES
Patient: Nathalie Gowers MRN: [id number]  SSN: [id number]    YOB: 1965  Age: 46 y.o. Sex: female      Date of Service: 7/18/2017   Provider: Kenyon Nails   Office Location:   [address] [facility], [address] [place] [address], [address]  Office Phone: [phone]  Office Fax: [phone]        REASON FOR VISIT:   Chief Complaint   Patient presents with    Follow-up     pt presents for follow up for brolken pinky toe on left foot pt states \" that pt have sharp pains in toe when pt walks     Back Pain     pt presents for back pain as well     Medication Refill     Oxycodone and Ibuprofen         VITALS:   Visit Vitals    BP (!) 191/116    Pulse (!) 58    Temp 97.6 °F (36.4 °C) (Oral)    Resp 20    Ht 5' 4\" (1.626 m)    Wt 136 lb (61.7 kg)    LMP 06/04/2017 (Exact Date)    SpO2 99%    BMI 23.34 kg/m2       MEDICATIONS:   Current Outpatient Prescriptions   Medication Sig Dispense Refill    oxyCODONE-acetaminophen (PERCOCET) 5-325 mg per tablet Take 1 Tab by mouth every eight (8) hours as needed for Pain. Max Daily Amount: 3 Tabs. 20 Tab 0    amLODIPine (NORVASC) 10 mg tablet Take 1 Tab by mouth daily. Indications: hypertension 90 Tab 1    hydroCHLOROthiazide (HYDRODIURIL) 12.5 mg tablet Take 1 Tab by mouth daily. Dosage unknown 90 Tab 1    cyclobenzaprine (FLEXERIL) 10 mg tablet Take 1 Tab by mouth three (3) times daily as needed for Muscle Spasm(s) for up to 285 days. 90 Tab 1    metoprolol tartrate (LOPRESSOR) 100 mg IR tablet Take 1 Tab by mouth two (2) times a day. 180 Tab 1    lisinopril (PRINIVIL, ZESTRIL) 40 mg tablet Take 1 Tab by mouth daily. 90 Tab 1    citalopram (CELEXA) 10 mg tablet Take 1 Tab by mouth daily. 90 Tab 1    hydrALAZINE (APRESOLINE) 25 mg tablet Take 1 Tab by mouth three (3) times daily.  270 Tab 1    gabapentin (NEURONTIN) 100 mg capsule 1 twice a day for 2 days then 1 three times a day for three days then 2 three times a day for 7 days then 3 three times a day 210 Cap 1    amitriptyline (ELAVIL) 25 mg tablet Take 1 Tab by mouth nightly. 30 Tab 3    topiramate (TOPAMAX) 50 mg tablet Take 1 Tab by mouth two (2) times a day. 60 Tab 3    butalbital-aspirin-caffeine (FIORINAL) capsule Take 1 Cap by mouth every four (4) hours as needed (headache). Max Daily Amount: 6 Caps. 30 Cap 0    ibuprofen (MOTRIN) 800 mg tablet Take 1 Tab by mouth every eight (8) hours as needed for Pain. 180 Tab 0        ALLERGIES:   No Known Allergies     ACTIVE MEDICAL PROBLEMS:  Patient Active Problem List   Diagnosis Code    Hypertension I10    Back pain M54.9    Numbness and tingling R20.0, R20.2    Acute cerebral infarction [facility]) I63.9        MEDICAL/SURGICAL HISTORY:  Past Medical History:   Diagnosis Date    Abnormal mammogram 3/2016    BIRADS 3 right breast    Hypertension     Stroke ([facility] [address] 2016    CVA      Past Surgical History:   Procedure Laterality Date    HX GYN               FAMILY HISTORY:  Family History   Problem Relation Age of Onset    No Known Problems Mother     No Known Problems Father     No Known Problems Sister     No Known Problems Brother     No Known Problems Maternal Aunt     No Known Problems Maternal Uncle     No Known Problems Paternal Aunt     No Known Problems Paternal Uncle     No Known Problems Maternal Grandmother     No Known Problems Paternal Grandmother     No Known Problems Paternal Grandfather     No Known Problems Other         SOCIAL HISTORY:  Social History   Substance Use Topics    Smoking status: Former Smoker     Quit date: 1999    Smokeless tobacco: Never Used    Alcohol use No            HISTORY OF PRESENT ILLNESS:   Roosevelt Walker is a 46 y.o. female who presents to the office for acute care. PCP: Dr. Ubaldo Becker. Patient presents in follow up of fractured left 5th toe. She was initially seen on  complaining of pain after stubbing her toe.  X-ray confirmed oblique fracture, with mild displacement and no significant angulation. She was advised to rest, ice, and elevate where possible, and ledy tape to the adjacent toe. She has been taking NSAIDs and Percocet for the pain with some relief. She reports her pain ranges from a 6-8, when it was previously a 10, however she still cannot tolerate any kind of closed toe shoe. It is also noted that patient's blood pressure is extremely elevated on intake today. She reports she is feeling fine, other than pain in her toe and back. She suspects BP elevation is due to the pain. She also admits she has not yet taken he blood pressure meds today      REVIEW OF SYSTEMS:  Review of Systems   Constitutional: Negative for chills and fever. Eyes: Negative for blurred vision and double vision. Respiratory: Negative for cough, shortness of breath and wheezing. Cardiovascular: Negative for chest pain and palpitations. Musculoskeletal: Positive for back pain and joint pain ( L 5th toe). Neurological: Negative for headaches. PHYSICAL EXAMINATION:  Physical Exam   Constitutional: She is oriented to person, place, and time and well-developed, well-nourished, and in no distress. Cardiovascular: Normal rate, regular rhythm and normal heart sounds. Exam reveals no gallop and no friction rub. No murmur heard. Pulmonary/Chest: Effort normal and breath sounds normal. She has no wheezes. She has no rales. Musculoskeletal:   left 4th-5th toes ledy taped together. Left 5th toe with slight edema, point tenderness to palpation over distal aspect. Distal neurovascularly intact   Neurological: She is alert and oriented to person, place, and time.         RESULTS:    Results from Hospital Encounter encounter on 06/26/17   XR 5TH TOE LT MIN 2 V   Narrative EXAM: X-ray of the fifth Digit of the Left Foot    INDICATION: Pain    TECHNIQUE: 3 views of the fifth digit of the left foot    COMPARISON: None    FINDINGS:   There is an oblique fracture of the distal articular surface of the fifth  proximal phalanx. There is mild lateral displacement and mild depression of the  articular surface. No significant angulation appreciated. No erosions or  periostitis seen. No lytic or blastic focus identified. The soft tissues are  unremarkable. Impression IMPRESSION:  1. Oblique fracture involving the distal articular surface of the fifth  proximal phalanx. ASSESSMENT/PLAN:  Kim was seen today for follow-up, back pain and medication refill. Diagnoses and all orders for this visit:    Closed non-physeal fracture of distal phalanx of lesser toe of left foot with routine healing, subsequent encounter  - Percocet and ibuprofen refilled today.  reviewed  - Patient advised Percocet will not be a chronic prescription   - Continue with supportive care and buddy taping   - Given persistence of point tenderness at 5 weeks, will refer to ortho for further eval. I discussed with patient that I'm not sure ortho will advise anything different as far as management, but they may be able to recommend a splint or soft orthopedic shoe to help with pain during recovery  Orders:   -     oxyCODONE-acetaminophen (PERCOCET) 5-325 mg per tablet; Take 1 Tab by mouth every eight (8) hours as needed for Pain. Max Daily Amount: 3 Tabs. -     ibuprofen (MOTRIN) 800 mg tablet; Take 1 Tab by mouth every eight (8) hours as needed for Pain.  -     REFERRAL TO ORTHOPEDICS    Elevated blood pressure reading  - Likely multifactorial. Patient has not taken her meds yet today, and is also experiencing acute pain. She will return as scheduled tomorrow for her well woman, and if not dramatically improved after taking her regular meds, will need to adjust antihypertensive regimen. Follow up tomorrow as scheduled  sooner PRN    Patient expresses understanding and is agreeable with the above plan.         PATIENT CARE TEAM:   Patient Care Team:  Jaycee Peña MD as PCP - General (Family Practice)  Shaun Raza MD as Consulting Provider (Neurology)       Kenyon Eckert   July 18, 2017    3:16 PM

## 2017-07-19 ENCOUNTER — OFFICE VISIT (OUTPATIENT)
Dept: FAMILY MEDICINE CLINIC | Age: 52
End: 2017-07-19

## 2017-07-19 VITALS
BODY MASS INDEX: 23.22 KG/M2 | OXYGEN SATURATION: 97 % | HEIGHT: 64 IN | RESPIRATION RATE: 18 BRPM | TEMPERATURE: 97.6 F | DIASTOLIC BLOOD PRESSURE: 101 MMHG | SYSTOLIC BLOOD PRESSURE: 165 MMHG | WEIGHT: 136 LBS | HEART RATE: 60 BPM

## 2017-07-19 DIAGNOSIS — Z01.419 WELL WOMAN EXAM WITH ROUTINE GYNECOLOGICAL EXAM: Primary | ICD-10-CM

## 2017-07-19 DIAGNOSIS — Z12.4 SCREENING FOR CERVICAL CANCER: ICD-10-CM

## 2017-07-19 DIAGNOSIS — N89.8 VAGINAL DISCHARGE: ICD-10-CM

## 2017-07-19 DIAGNOSIS — I10 ESSENTIAL HYPERTENSION: Chronic | ICD-10-CM

## 2017-07-19 RX ORDER — HYDROCHLOROTHIAZIDE 25 MG/1
25 TABLET ORAL DAILY
Qty: 30 TAB | Refills: 3 | Status: SHIPPED | OUTPATIENT
Start: 2017-07-19 | End: 2018-04-18 | Stop reason: SDUPTHER

## 2017-07-19 NOTE — PROGRESS NOTES
Patient: Naomi Brower MRN: [id number]  SSN: [id number]    YOB: 1965  Age: 46 y.o. Sex: female        Date of Service: 7/19/2017   Provider: Kenyon Triana   Office Location:   [address] [place] [facility],  [address], [address]  Office Phone: [phone]  Office Fax: [phone]      REASON FOR VISIT:   Naomi Brower is a 46 y.o. female who presents to the office for her annual well woman exam.     VITALS:   Visit Vitals    BP (!) 165/101    Pulse 60    Temp 97.6 °F (36.4 °C) (Oral)    Resp 18    Ht 5' 4\" (1.626 m)    Wt 136 lb (61.7 kg)    LMP 06/04/2017 (Exact Date)    SpO2 97%    BMI 23.34 kg/m2       MEDICATIONS:   Current Outpatient Prescriptions on File Prior to Visit   Medication Sig Dispense Refill    oxyCODONE-acetaminophen (PERCOCET) 5-325 mg per tablet Take 1 Tab by mouth every eight (8) hours as needed for Pain. Max Daily Amount: 3 Tabs. 20 Tab 0    ibuprofen (MOTRIN) 800 mg tablet Take 1 Tab by mouth every eight (8) hours as needed for Pain. 180 Tab 0    amLODIPine (NORVASC) 10 mg tablet Take 1 Tab by mouth daily. Indications: hypertension 90 Tab 1    hydroCHLOROthiazide (HYDRODIURIL) 12.5 mg tablet Take 1 Tab by mouth daily. Dosage unknown 90 Tab 1    cyclobenzaprine (FLEXERIL) 10 mg tablet Take 1 Tab by mouth three (3) times daily as needed for Muscle Spasm(s) for up to 285 days. 90 Tab 1    metoprolol tartrate (LOPRESSOR) 100 mg IR tablet Take 1 Tab by mouth two (2) times a day. 180 Tab 1    lisinopril (PRINIVIL, ZESTRIL) 40 mg tablet Take 1 Tab by mouth daily. 90 Tab 1    citalopram (CELEXA) 10 mg tablet Take 1 Tab by mouth daily. 90 Tab 1    hydrALAZINE (APRESOLINE) 25 mg tablet Take 1 Tab by mouth three (3) times daily.  270 Tab 1    gabapentin (NEURONTIN) 100 mg capsule 1 twice a day for 2 days then 1 three times a day for three days then 2 three times a day for 7 days then 3 three times a day 210 Cap 1    amitriptyline (ELAVIL) 25 mg tablet Take 1 Tab by mouth nightly. 30 Tab 3    topiramate (TOPAMAX) 50 mg tablet Take 1 Tab by mouth two (2) times a day. 60 Tab 3    butalbital-aspirin-caffeine (FIORINAL) capsule Take 1 Cap by mouth every four (4) hours as needed (headache). Max Daily Amount: 6 Caps. 30 Cap 0     No current facility-administered medications on file prior to visit.          ALLERGIES:   No Known Allergies     PROBLEM LIST:  Patient Active Problem List   Diagnosis Code    Hypertension I10    Back pain M54.9    Numbness and tingling R20.0, R20.2    Acute cerebral infarction ([facility] [address] I63.9        PAST MEDICAL HISTORY:  Past Medical History:   Diagnosis Date    Abnormal mammogram 3/2016    BIRADS 3 right breast    Hypertension     Stroke ([facility] [address] 2016    CVA        SURGICAL HISTORY:  Past Surgical History:   Procedure Laterality Date    HX GYN               FAMILY HISTORY:  Family History   Problem Relation Age of Onset    No Known Problems Mother     No Known Problems Father     No Known Problems Sister     No Known Problems Brother     No Known Problems Maternal Aunt     No Known Problems Maternal Uncle     No Known Problems Paternal Aunt     No Known Problems Paternal Uncle     No Known Problems Maternal Grandmother     No Known Problems Paternal Grandmother     No Known Problems Paternal Grandfather     No Known Problems Other         SOCIAL HISTORY:  Social History     Social History    Marital status:      Spouse name: N/A    Number of children: N/A    Years of education: N/A     Social History Main Topics    Smoking status: Former Smoker     Quit date: 1999    Smokeless tobacco: Never Used    Alcohol use No    Drug use: No    Sexual activity: Yes     Partners: Male     Birth control/ protection: Condom     Other Topics Concern    None     Social History Narrative      OBSTETRIC HISTORY:  OB History     No data available           MENSTRUAL HISTORY:  Age at menarche: 12-13 years  LMP: 7/1/17  Length of Menses: 3 days  Description of Flow: normal  Menstrual Complaints: none    SEXUAL HISTORY:  Sexually active: YES  Number of partners: 1  Sexual partner(s) (male/female/both): male  Contraceptive method: none  History of STIs: none    HEALTH SCREENING HISTORY:  Last pap: 5-7 years ago   Results: normal  History of abnormal pap?: NO    Last mammogram: BI-Rads 3 on 10/7/16, overdue for 6 month follow up  Last DEXA scan: N/A  Last colorectal screening: patient has been referred to Dr. Siddiqui, states she is planning on calling to re-schedule her appointment     HPI:   Here today for well woman exam. No acute concerns, however she was seen yesterday in follow up of her broken toe, and BP was noted to be extremely elevated. She was in a lot of pain at that time, and had not yet taken her blood pressure pills. REVIEW OF SYSTEMS:   Review of Systems   Constitutional: Negative for chills and fever. Respiratory: Negative for cough, shortness of breath and wheezing. Cardiovascular: Negative for chest pain and palpitations. Gastrointestinal: Negative for abdominal pain, nausea and vomiting. Genitourinary: Negative for dysuria, frequency, hematuria and urgency. Breasts: Denies masses, skin changes, nipple discharge  Genitourinary: Denies pelvic pain, dyspareunia, abnormal vaginal bleeding or discharge, urinary frequency, urgency, dysuria, hematuria. PHYSICAL EXAMINATION:   Physical Exam   Constitutional: She is oriented to person, place, and time and well-developed, well-nourished, and in no distress. Cardiovascular: Normal rate, regular rhythm and normal heart sounds. Exam reveals no gallop and no friction rub. No murmur heard. Pulmonary/Chest: Effort normal and breath sounds normal. She has no wheezes. She has no rales. Neurological: She is alert and oriented to person, place, and time. Skin: Skin is warm and dry. No rash noted. Breasts: Symmetric bilaterally without skin or nipple changes, tenderness, or masses. Diffusely fibrocystic. Pelvic: External genitalia - no erythema, rashes, or lesions. Internal - vagina pink rugae without lesions. Scant thin white discharge noted. Cervix - pink, non-friable, os patent with no discharge. No cervical motion tenderness. Bimanual - Uterus and adnexae non-tender. No masses palpated. ASSESSMENT/PLAN:  Kim was seen today for well woman. Diagnoses and all orders for this visit:    Well woman exam with routine gynecological exam    Screening for cervical cancer  -     PAP, LB, RFX HPV QYNJ([id number]); Future    Vaginal discharge  -     NUSWAB VAGINITIS PLUS; Future    Essential hypertension  - BP significantly improved since yesterday, but still not at goal.   - Will increase HCTZ to 25 mg daily   - Patient to call if home BP readings persistently > 140/90  -     hydroCHLOROthiazide (HYDRODIURIL) 25 mg tablet; Take 1 Tab by mouth daily. Follow up with Dr. Emile Street in October as scheduled  Return sooner PRN    Patient expresses understanding and is agreeable with the above plan.       [employer] [place]  7/19/2017   2:08 PM

## 2017-07-22 LAB
A VAGINAE DNA VAG QL NAA+PROBE: NORMAL SCORE
BVAB2 DNA VAG QL NAA+PROBE: NORMAL SCORE
C ALBICANS DNA VAG QL NAA+PROBE: NEGATIVE
C GLABRATA DNA VAG QL NAA+PROBE: NEGATIVE
C TRACH RRNA SPEC QL NAA+PROBE: NEGATIVE
CYTOLOGIST CVX/VAG CYTO: NORMAL
DX ICD CODE: NORMAL
LABCORP, 190119: NORMAL
Lab: NORMAL
MEGA1 DNA VAG QL NAA+PROBE: NORMAL SCORE
N GONORRHOEA RRNA SPEC QL NAA+PROBE: NEGATIVE
OTHER STN SPEC: NORMAL
PATH REPORT.FINAL DX SPEC: NORMAL
PATHOLOGIST CVX/VAG CYTO: NORMAL
RECOM F/U CVX/VAG CYTO: NORMAL
STAT OF ADQ CVX/VAG CYTO-IMP: NORMAL
T VAGINALIS RRNA SPEC QL NAA+PROBE: NEGATIVE

## 2017-08-09 ENCOUNTER — PATIENT OUTREACH (OUTPATIENT)
Dept: FAMILY MEDICINE CLINIC | Age: 52
End: 2017-08-09

## 2017-08-09 NOTE — PROGRESS NOTES
NN health Screenings:    Episode closed - unable to reach patient for f/u on mammogram/CRC screenings as her VM indicates my calls are blocked. Noted cervical cancer screening was done and negative.

## 2017-08-10 ENCOUNTER — OFFICE VISIT (OUTPATIENT)
Dept: ORTHOPEDIC SURGERY | Facility: CLINIC | Age: 52
End: 2017-08-10

## 2017-08-10 VITALS
OXYGEN SATURATION: 100 % | SYSTOLIC BLOOD PRESSURE: 201 MMHG | RESPIRATION RATE: 18 BRPM | HEART RATE: 65 BPM | DIASTOLIC BLOOD PRESSURE: 117 MMHG | WEIGHT: 133 LBS | HEIGHT: 64 IN | TEMPERATURE: 98.3 F | BODY MASS INDEX: 22.71 KG/M2

## 2017-08-10 DIAGNOSIS — M79.672 LEFT FOOT PAIN: ICD-10-CM

## 2017-08-10 DIAGNOSIS — S92.515A NONDISPLACED FRACTURE OF PROXIMAL PHALANX OF LEFT LESSER TOE(S), INITIAL ENCOUNTER FOR CLOSED FRACTURE: Primary | ICD-10-CM

## 2017-08-10 NOTE — PATIENT INSTRUCTIONS
Broken Toe: Care Instructions  Your Care Instructions  You have broken (fractured) a bone in your toe. This kind of fracture does not need a special cast or brace. \"Ledy-taping\" your broken toe to a healthy toe next to it is almost always enough to treat the problem and ease symptoms. The toe may take 4 weeks or more to heal.  You heal best when you take good care of yourself. Eat a variety of healthy foods, and don't smoke. Follow-up care is a key part of your treatment and safety. Be sure to make and go to all appointments, and call your doctor if you are having problems. It's also a good idea to know your test results and keep a list of the medicines you take. How can you care for yourself at home? · Be safe with medicines. Take pain medicines exactly as directed. ¨ If the doctor gave you a prescription medicine for pain, take it as prescribed. ¨ If you are not taking a prescription pain medicine, ask your doctor if you can take an over-the-counter medicine. · If your toe is taped to the toe next to it, your doctor has shown you how to change the tape. Protect the skin by putting something soft, such as felt or foam, between your toes before you tape them together. Never tape the toes together skin-to-skin. Your broken toe may need to be ledy-taped for 2 to 4 weeks to heal.  · Rest and protect your toe. Do not walk on it until you can do so without too much pain. If the doctor has told you to use crutches, use them as instructed. · Put ice or a cold pack on your toe for 10 to 20 minutes at a time. Try to do this every 1 to 2 hours for the next 3 days (when you are awake) or until the swelling goes down. Put a thin cloth between the ice and your skin. · Prop up your foot on a pillow when you ice it or anytime you sit or lie down. Try to keep it above the level of your heart. This will help reduce swelling. · Make sure you go to your follow-up appointments.  Your doctor will need to check that your toe is healing right. When should you call for help? Call your doctor now or seek immediate medical care if:  · You have severe pain. · Your toe is cool or pale or changes color. · You have tingling, weakness, or numbness in your toe. Watch closely for changes in your health, and be sure to contact your doctor if:  · Pain and swelling get worse or are not improving. · You have a new or worse deformity in your toe. Where can you learn more? Go to http://collette-grecia.info/. Enter C978 in the search box to learn more about \"Broken Toe: Care Instructions. \"  Current as of: October 19, 2016  Content Version: 11.3  © 6886-4235 [employer], [employer]. Care instructions adapted under license by Dragon [employer]ail (which disclaims liability or warranty for this information). If you have questions about a medical condition or this instruction, always ask your healthcare professional. [address] any warranty or liability for your use of this information.

## 2017-08-10 NOTE — PROGRESS NOTES
Patient: Naomi Brower                MRN: [id number]       SSN: [id number]  YOB: 1965        AGE: 46 y.o. SEX: female    PCP: Jose Gallegos MD  08/10/17    Chief Complaint   Patient presents with    Foot Pain     Left     HISTORY:  Naomi Brower is a 46 y.o. female who is seen for left foot pain. She states she was walking out of her bedroom and struck her left little toe on a wooden  June 2017. She reports pain in her left little toe. She reports swelling and bruising of her left little toe. She states she has been taking Percoet for pain. She notes pain with standing and walking. Pain Assessment  8/10/2017   Severity of Pain 6   Quality of Pain Throbbing; Jerline Maidens; Aching   Duration of Pain Persistent   Frequency of Pain Constant   Aggravating Factors Bending;Standing;Walking   Limiting Behavior Yes   Relieving Factors Nothing   Result of Injury Yes   Work-Related Injury No   Type of Injury Other (Comment)   Type of Injury Comment hit foot on wooden stand     Occupation, etc:  Ms. Alon Johnson is an  at [employer]. She lives in [place] with her friend. She has three adult children, two sons that live in [facility]. and a daughter in [place]. She has grandchildren and one on the way any day. Current weight is 133 pounds. She is 5'4\" tall. She is hypertensive. She is not diabetic.      Lab Results   Component Value Date/Time    Hemoglobin A1c 6.1 08/02/2016 02:20 AM     Weight Metrics 8/10/2017 7/19/2017 7/18/2017 6/26/2017 6/2/2017 6/1/2017 5/11/2017   Weight 133 lb 136 lb 136 lb 131 lb 12.8 oz 129 lb 131 lb 132 lb   BMI 22.83 kg/m2 23.34 kg/m2 23.34 kg/m2 22.62 kg/m2 22.14 kg/m2 22.49 kg/m2 22.66 kg/m2       Patient Active Problem List   Diagnosis Code    Hypertension I10    Back pain M54.9    Numbness and tingling R20.0, R20.2    Acute cerebral infarction ([facility] [address] I63.9     REVIEW OF SYSTEMS: All Below are Negative except: See HPI   Constitutional: negative for fever, chills, and weight loss. Cardiovascular: negative for chest pain, claudication, leg swelling, SOB, CLOUD   Gastrointestinal: Negative for pain, N/V/C/D, Blood in stool or urine, dysuria,  hematuria, incontinence, pelvic pain. Musculoskeletal: See HPI   Neurological: Negative for dizziness and weakness. Negative for headaches, Visual changes, confusion, seizures   Phychiatric/Behavioral: Negative for depression, memory loss, substance  abuse. Extremities: Negative for hair changes, rash, or skin lesion changes. Hematologic: Negative for bleeding problems, bruising, pallor or swollen lymph  nodes   Peripheral Vascular: No calf pain, no circulation deficits. Social History     Social History    Marital status:      Spouse name: N/A    Number of children: N/A    Years of education: N/A     Occupational History    Not on file. Social History Main Topics    Smoking status: Former Smoker     Quit date: 11/16/1999    Smokeless tobacco: Never Used    Alcohol use No    Drug use: No    Sexual activity: Yes     Partners: Male     Birth control/ protection: Condom     Other Topics Concern    Not on file     Social History Narrative      No Known Allergies   Current Outpatient Prescriptions   Medication Sig    hydroCHLOROthiazide (HYDRODIURIL) 25 mg tablet Take 1 Tab by mouth daily.  oxyCODONE-acetaminophen (PERCOCET) 5-325 mg per tablet Take 1 Tab by mouth every eight (8) hours as needed for Pain. Max Daily Amount: 3 Tabs.  ibuprofen (MOTRIN) 800 mg tablet Take 1 Tab by mouth every eight (8) hours as needed for Pain.  amLODIPine (NORVASC) 10 mg tablet Take 1 Tab by mouth daily. Indications: hypertension    cyclobenzaprine (FLEXERIL) 10 mg tablet Take 1 Tab by mouth three (3) times daily as needed for Muscle Spasm(s) for up to 285 days.  metoprolol tartrate (LOPRESSOR) 100 mg IR tablet Take 1 Tab by mouth two (2) times a day.     lisinopril (PRINIVIL, ZESTRIL) 40 mg tablet Take 1 Tab by mouth daily.  citalopram (CELEXA) 10 mg tablet Take 1 Tab by mouth daily.  hydrALAZINE (APRESOLINE) 25 mg tablet Take 1 Tab by mouth three (3) times daily.  gabapentin (NEURONTIN) 100 mg capsule 1 twice a day for 2 days then 1 three times a day for three days then 2 three times a day for 7 days then 3 three times a day    amitriptyline (ELAVIL) 25 mg tablet Take 1 Tab by mouth nightly.  topiramate (TOPAMAX) 50 mg tablet Take 1 Tab by mouth two (2) times a day.  butalbital-aspirin-caffeine (FIORINAL) capsule Take 1 Cap by mouth every four (4) hours as needed (headache). Max Daily Amount: 6 Caps. No current facility-administered medications for this visit. PHYSICAL EXAMINATION:  Visit Vitals    BP (!) 201/117    Pulse 65    Temp 98.3 °F (36.8 °C) (Oral)    Resp 18    Ht 5' 4\" (1.626 m)    Wt 133 lb (60.3 kg)    SpO2 100%    BMI 22.83 kg/m2      ORTHO EXAMINATION:  Examination Right Ankle/Foot Left Ankle/Foot   Skin Intact Resolving bruise   Swelling - +, left little toe   Dorsiflexion 10 10   Plantarflexion 25 25   Deformity - -   Inversion laxity - -   Anterior drawer - -   Medial tenderness - -   Lateral tenderness - -   Heel cord Intact Intact   Sensation Intact Intact   Bunion - -   Toe nails Normal Normal   Capillary refill Normal Normal   Tenderness, swelling, purplish discoloration over left little toe    RADIOGRAPHS:  XR LEFT FOOT 8/10/17  IMPRESSION:  Three views - Early healing of proximal phalanx fracture of the little toe, no bunion deformity, no heel spur. XR 5TH TOE LEFT 6/26/17  IMPRESSION:  1. Oblique fracture involving the distal articular surface of the fifth proximal phalanx. IMPRESSION:      ICD-10-CM ICD-9-CM    1. Nondisplaced fracture of proximal phalanx of left lesser toe(s), initial encounter for closed fracture S92.515A 826.0 MA CLOSED RX TOE FX      CAST BOOT OR SHOE   2.  Left foot pain M79.672 729.5 AMB POC XRAY, FOOT; COMPLETE, 3+ VIEW      CAST BOOT OR SHOE      CANCELED: AMB POC X-RAY KNEE 3 VIEW     PLAN:  A short fracture walker was applied today. She will follow up in four weeks. She will follow up with her primary care physician for hypertension. She will take OTC NSAIDs for pain.      Scribed by Lashell Wallace ([facility]) as dictated by Faye Masterson MD

## 2017-08-10 NOTE — MR AVS SNAPSHOT
Visit Information Date & Time Provider Department Dept. Phone Encounter #  
 8/10/2017  1:10 PM Mary Jane Whalen MD [facility] [phone] Follow-up Instructions Return in about 6 weeks (around 9/21/2017). Your Appointments 10/5/2017 12:45 PM  
FOLLOW UP EXAM with Jens Agrawal MD  
[address]) Appt Note: 4 month f/u  
 [address] [facility] [id number]  
[facility] [id number] Upcoming Health Maintenance Date Due DTaP/Tdap/Td series (1 - Tdap) 7/27/1986 FOBT Q 1 YEAR AGE 50-75 3/2/2017 BREAST CANCER SCRN MAMMOGRAM 4/7/2017 INFLUENZA AGE 9 TO ADULT 8/1/2017 PAP AKA CERVICAL CYTOLOGY 7/19/2020 Allergies as of 8/10/2017  Review Complete On: 8/10/2017 By: Juarez Torres No Known Allergies Current Immunizations  Reviewed on 8/1/2016 No immunizations on file. Not reviewed this visit You Were Diagnosed With   
  
 Codes Comments Nondisplaced fracture of proximal phalanx of left lesser toe(s), initial encounter for closed fracture    -  Primary ICD-10-CM: I02.197Q ICD-9-CM: 826.0 Left foot pain     ICD-10-CM: R85.380 ICD-9-CM: 729.5 Vitals BP Pulse Temp Resp Height(growth percentile) Weight(growth percentile) (!) 201/117 65 98.3 °F (36.8 °C) (Oral) 18 5' 4\" (1.626 m) 133 lb (60.3 kg) SpO2 BMI OB Status Smoking Status 100% 22.83 kg/m2 Having regular periods Former Smoker BMI and BSA Data Body Mass Index Body Surface Area  
 22.83 kg/m 2 1.65 m 2 Preferred Pharmacy Pharmacy Name Phone [facility] [address] [place] [address] [phone] Your Updated Medication List  
  
   
This list is accurate as of: 8/10/17  1:59 PM.  Always use your most recent med list.  
  
  
  
  
 amitriptyline 25 mg tablet Commonly known as:  ELAVIL Take 1 Tab by mouth nightly. amLODIPine 10 mg tablet Commonly known as:  Chelly Waite Take 1 Tab by mouth daily. Indications: hypertension  
  
 butalbital-aspirin-caffeine capsule Commonly known as:  Myriam Coffeeville Take 1 Cap by mouth every four (4) hours as needed (headache). Max Daily Amount: 6 Caps. citalopram 10 mg tablet Commonly known as:  Demar Hopes Take 1 Tab by mouth daily. cyclobenzaprine 10 mg tablet Commonly known as:  FLEXERIL Take 1 Tab by mouth three (3) times daily as needed for Muscle Spasm(s) for up to 285 days. gabapentin 100 mg capsule Commonly known as:  NEURONTIN  
1 twice a day for 2 days then 1 three times a day for three days then 2 three times a day for 7 days then 3 three times a day  
  
 hydrALAZINE 25 mg tablet Commonly known as:  APRESOLINE Take 1 Tab by mouth three (3) times daily. hydroCHLOROthiazide 25 mg tablet Commonly known as:  HYDRODIURIL Take 1 Tab by mouth daily. ibuprofen 800 mg tablet Commonly known as:  MOTRIN Take 1 Tab by mouth every eight (8) hours as needed for Pain. lisinopril 40 mg tablet Commonly known as:  Veronica Lookout Take 1 Tab by mouth daily. metoprolol tartrate 100 mg IR tablet Commonly known as:  LOPRESSOR Take 1 Tab by mouth two (2) times a day. oxyCODONE-acetaminophen 5-325 mg per tablet Commonly known as:  PERCOCET Take 1 Tab by mouth every eight (8) hours as needed for Pain. Max Daily Amount: 3 Tabs. topiramate 50 mg tablet Commonly known as:  TOPAMAX Take 1 Tab by mouth two (2) times a day. We Performed the Following AMB POC XRAY, FOOT; COMPLETE, 3+ VIEW [53726 CPT(R)] Follow-up Instructions Return in about 6 weeks (around 9/21/2017). Patient Instructions Broken Toe: Care Instructions Your Care Instructions You have broken (fractured) a bone in your toe.  This kind of fracture does not need a special cast or brace. \"Ledy-taping\" your broken toe to a healthy toe next to it is almost always enough to treat the problem and ease symptoms. The toe may take 4 weeks or more to heal. 
You heal best when you take good care of yourself. Eat a variety of healthy foods, and don't smoke. Follow-up care is a key part of your treatment and safety. Be sure to make and go to all appointments, and call your doctor if you are having problems. It's also a good idea to know your test results and keep a list of the medicines you take. How can you care for yourself at home? · Be safe with medicines. Take pain medicines exactly as directed. ¨ If the doctor gave you a prescription medicine for pain, take it as prescribed. ¨ If you are not taking a prescription pain medicine, ask your doctor if you can take an over-the-counter medicine. · If your toe is taped to the toe next to it, your doctor has shown you how to change the tape. Protect the skin by putting something soft, such as felt or foam, between your toes before you tape them together. Never tape the toes together skin-to-skin. Your broken toe may need to be ledy-taped for 2 to 4 weeks to heal. 
· Rest and protect your toe. Do not walk on it until you can do so without too much pain. If the doctor has told you to use crutches, use them as instructed. · Put ice or a cold pack on your toe for 10 to 20 minutes at a time. Try to do this every 1 to 2 hours for the next 3 days (when you are awake) or until the swelling goes down. Put a thin cloth between the ice and your skin. · Prop up your foot on a pillow when you ice it or anytime you sit or lie down. Try to keep it above the level of your heart. This will help reduce swelling. · Make sure you go to your follow-up appointments. Your doctor will need to check that your toe is healing right. When should you call for help? Call your doctor now or seek immediate medical care if: 
· You have severe pain. · Your toe is cool or pale or changes color. · You have tingling, weakness, or numbness in your toe. Watch closely for changes in your health, and be sure to contact your doctor if: 
· Pain and swelling get worse or are not improving. · You have a new or worse deformity in your toe. Where can you learn more? Go to http://collette-grecia.info/. Enter Q350 in the search box to learn more about \"Broken Toe: Care Instructions. \" Current as of: October 19, 2016 Content Version: 11.3 © 9286-9914 [employer]. Care instructions adapted under license by [employer] (which disclaims liability or warranty for this information). If you have questions about a medical condition or this instruction, always ask your healthcare professional. [address] any warranty or liability for your use of this information. Introducing [facility]! Dear [employer]: 
Thank you for requesting a TheDigitel account. Our records indicate that you already have an active TheDigitel account. You can access your account anytime at [email or web address] Chinese Did you know that you can access your hospital and ER discharge instructions at any time in TheDigitel? You can also review all of your test results from your hospital stay or ER visit. Additional Information If you have questions, please visit the Frequently Asked Questions section of the TheDigitel website at [email or web address] Remember, TheDigitel is NOT to be used for urgent needs. For medical emergencies, dial 911. Now available from your iPhone and Android! Please provide this summary of care documentation to your next provider. Your primary care clinician is listed as Jaylyn Harvey. If you have any questions after today's visit, please call [phone].

## 2017-08-18 ENCOUNTER — PATIENT OUTREACH (OUTPATIENT)
Dept: FAMILY MEDICINE CLINIC | Age: 52
End: 2017-08-18

## 2017-08-25 ENCOUNTER — PATIENT OUTREACH (OUTPATIENT)
Dept: FAMILY MEDICINE CLINIC | Age: 52
End: 2017-08-25

## 2017-09-05 ENCOUNTER — PATIENT OUTREACH (OUTPATIENT)
Dept: FAMILY MEDICINE CLINIC | Age: 52
End: 2017-09-05

## 2017-09-05 NOTE — PROGRESS NOTES
NN health screenings:    Noted in [facility] is scheduled with [place], Dr. Estefania [place] nurse, for September 15th in preparation for colonoscopy procedure.

## 2017-09-13 ENCOUNTER — TELEPHONE (OUTPATIENT)
Dept: SURGERY | Age: 52
End: 2017-09-13

## 2017-09-13 NOTE — TELEPHONE ENCOUNTER
LM regarding upcoming visit with Dr. Jessica Rebollar for colon screening. Patient has no insurance. ..

## 2017-09-15 ENCOUNTER — PATIENT OUTREACH (OUTPATIENT)
Dept: FAMILY MEDICINE CLINIC | Age: 52
End: 2017-09-15

## 2017-09-15 NOTE — PROGRESS NOTES
NN health screening:    Noted in chart that patient lost her insurance? Plan to call [facility] to see if they provide diagnostic mammograms for the uninsured.

## 2017-09-19 ENCOUNTER — PATIENT OUTREACH (OUTPATIENT)
Dept: FAMILY MEDICINE CLINIC | Age: 52
End: 2017-09-19

## 2017-09-19 NOTE — PROGRESS NOTES
NN health screening:    Noted in chart Ms Ramirez's appointments for CRC screening and mammogram have been placed on hold until she can \"figure out her insurance fiasco\". Meanwhile we suggested she contact Dr. Jose Elias Samaniego for scripts for lisinopril/metoprolol/amlodpine to take to [facility] and/or [facility] for minimal cost out of pocket to refill these medications temporarily. Ms Anne Vanessa was very appreciative and will try these suggestions tomorrow.

## 2017-09-21 ENCOUNTER — PATIENT OUTREACH (OUTPATIENT)
Dept: FAMILY MEDICINE CLINIC | Age: 52
End: 2017-09-21

## 2017-09-21 DIAGNOSIS — F41.9 ANXIETY: ICD-10-CM

## 2017-09-21 DIAGNOSIS — I10 ESSENTIAL HYPERTENSION WITH GOAL BLOOD PRESSURE LESS THAN 140/90: Chronic | ICD-10-CM

## 2017-09-21 DIAGNOSIS — M62.830 MUSCLE SPASM OF BACK: ICD-10-CM

## 2017-09-21 RX ORDER — LISINOPRIL 40 MG/1
40 TABLET ORAL DAILY
Qty: 90 TAB | Refills: 1 | Status: SHIPPED | OUTPATIENT
Start: 2017-09-21 | End: 2018-04-18 | Stop reason: SDUPTHER

## 2017-09-21 RX ORDER — AMLODIPINE BESYLATE 10 MG/1
10 TABLET ORAL DAILY
Qty: 90 TAB | Refills: 1 | Status: SHIPPED | OUTPATIENT
Start: 2017-09-21 | End: 2018-04-18 | Stop reason: SDUPTHER

## 2017-09-21 RX ORDER — METOPROLOL TARTRATE 100 MG/1
100 TABLET ORAL 2 TIMES DAILY
Qty: 180 TAB | Refills: 1 | Status: SHIPPED | OUTPATIENT
Start: 2017-09-21 | End: 2018-04-18 | Stop reason: SDUPTHER

## 2017-09-21 RX ORDER — HYDRALAZINE HYDROCHLORIDE 25 MG/1
25 TABLET, FILM COATED ORAL 3 TIMES DAILY
Qty: 270 TAB | Refills: 1 | Status: SHIPPED | OUTPATIENT
Start: 2017-09-21 | End: 2018-04-18 | Stop reason: SDUPTHER

## 2017-09-21 NOTE — PROGRESS NOTES
NN health screening:    Email this morning from Dr. Eliceo Abernathy indicates Ms De La Torre can  RX for her medications @ the office and she is more than welcome to make an appointment @ the office as needed. I have called Ms De La Torre to relay this message.

## 2017-10-09 ENCOUNTER — OFFICE VISIT (OUTPATIENT)
Dept: ORTHOPEDIC SURGERY | Facility: CLINIC | Age: 52
End: 2017-10-09

## 2017-10-09 VITALS
SYSTOLIC BLOOD PRESSURE: 199 MMHG | WEIGHT: 140.4 LBS | RESPIRATION RATE: 15 BRPM | TEMPERATURE: 97.8 F | DIASTOLIC BLOOD PRESSURE: 119 MMHG | HEART RATE: 72 BPM | BODY MASS INDEX: 23.97 KG/M2 | HEIGHT: 64 IN

## 2017-10-09 DIAGNOSIS — M79.672 LEFT FOOT PAIN: Primary | ICD-10-CM

## 2017-10-09 DIAGNOSIS — S92.535D CLOSED NONDISPLACED FRACTURE OF DISTAL PHALANX OF LESSER TOE OF LEFT FOOT WITH ROUTINE HEALING, SUBSEQUENT ENCOUNTER: ICD-10-CM

## 2017-10-09 NOTE — MR AVS SNAPSHOT
Visit Information Date & Time Provider Department Dept. Phone Encounter #  
 10/9/2017  4:15 PM Buddy Buerger, PA-C [address] [facility] [phone] Upcoming Health Maintenance Date Due DTaP/Tdap/Td series (1 - Tdap) 7/27/1986 FOBT Q 1 YEAR AGE 50-75 3/2/2017 BREAST CANCER SCRN MAMMOGRAM 4/7/2017 INFLUENZA AGE 9 TO ADULT 8/1/2017 PAP AKA CERVICAL CYTOLOGY 7/19/2020 Allergies as of 10/9/2017  Review Complete On: 10/9/2017 By: Katarzyna Matos LPN No Known Allergies Current Immunizations  Reviewed on 8/1/2016 No immunizations on file. Not reviewed this visit You Were Diagnosed With   
  
 Codes Comments Left foot pain    -  Primary ICD-10-CM: F64.779 ICD-9-CM: 729.5 Closed nondisplaced fracture of distal phalanx of lesser toe of left foot with routine healing, subsequent encounter     ICD-10-CM: X24.945H ICD-9-CM: V54.19 Vitals BP Pulse Temp Resp Height(growth percentile) Weight(growth percentile) (!) 199/119 72 97.8 °F (36.6 °C) 15 5' 4\" (1.626 m) 140 lb 6.4 oz (63.7 kg) BMI OB Status Smoking Status 24.1 kg/m2 Having regular periods Former Smoker Vitals History BMI and BSA Data Body Mass Index Body Surface Area  
 24.1 kg/m 2 1.7 m 2 Preferred Pharmacy Pharmacy Name Phone [facility] [address] [place] [address] [phone] Your Updated Medication List  
  
   
This list is accurate as of: 10/9/17  5:06 PM.  Always use your most recent med list.  
  
  
  
  
 amitriptyline 25 mg tablet Commonly known as:  ELAVIL Take 1 Tab by mouth nightly. amLODIPine 10 mg tablet Commonly known as:  Voncile Christopher Take 1 Tab by mouth daily. Indications: hypertension  
  
 butalbital-aspirin-caffeine capsule Commonly known as:  Audery Province Take 1 Cap by mouth every four (4) hours as needed (headache).  Max Daily Amount: 6 Caps. citalopram 10 mg tablet Commonly known as:  Kellee Eye Take 1 Tab by mouth daily. cyclobenzaprine 10 mg tablet Commonly known as:  FLEXERIL Take 1 Tab by mouth three (3) times daily as needed for Muscle Spasm(s) for up to 285 days. gabapentin 100 mg capsule Commonly known as:  NEURONTIN  
1 twice a day for 2 days then 1 three times a day for three days then 2 three times a day for 7 days then 3 three times a day  
  
 hydrALAZINE 25 mg tablet Commonly known as:  APRESOLINE Take 1 Tab by mouth three (3) times daily. hydroCHLOROthiazide 25 mg tablet Commonly known as:  HYDRODIURIL Take 1 Tab by mouth daily. ibuprofen 800 mg tablet Commonly known as:  MOTRIN Take 1 Tab by mouth every eight (8) hours as needed for Pain. lisinopril 40 mg tablet Commonly known as:  Tameka Escort Take 1 Tab by mouth daily. metoprolol tartrate 100 mg IR tablet Commonly known as:  LOPRESSOR Take 1 Tab by mouth two (2) times a day. oxyCODONE-acetaminophen 5-325 mg per tablet Commonly known as:  PERCOCET Take 1 Tab by mouth every eight (8) hours as needed for Pain. Max Daily Amount: 3 Tabs. topiramate 50 mg tablet Commonly known as:  TOPAMAX Take 1 Tab by mouth two (2) times a day. We Performed the Following AMB POC XRAY, FOOT; COMPLETE, 3+ VIEW [12126 CPT(R)] Introducing [facility]! Dear Brandyn Section: 
Thank you for requesting a OuiCar account. Our records indicate that you already have an active OuiCar account. You can access your account anytime at [email or web address] Did you know that you can access your hospital and ER discharge instructions at any time in OuiCar? You can also review all of your test results from your hospital stay or ER visit. Additional Information If you have questions, please visit the Frequently Asked Questions section of the Autobook Now website at [email or web address] Remember, Autobook Now is NOT to be used for urgent needs. For medical emergencies, dial 911. Now available from your iPhone and Android! Please provide this summary of care documentation to your next provider. Your primary care clinician is listed as Ellwood Richmond. If you have any questions after today's visit, please call [phone].

## 2017-10-09 NOTE — LETTER
NOTIFICATION RETURN TO WORK / SCHOOL 
 
10/9/2017 5:08 PM 
 
Ms. [employer] [address] [place] [id number] To Whom It May Concern: 
 
[employer] is currently under the care of [address] [place] [address]. She will return to work on 10-10-17 full duty with no restrictions. If there are questions or concerns please have the patient contact our office.  
 
 
 
Sincerely, 
 
 
Shyann Crowley PA-C

## 2017-10-09 NOTE — PROGRESS NOTES
HISTORY OF PRESENT ILLNESS:  Eron See returns. She is following for a left little toe fracture that occurred back in June. She was seen under the care of Dr. Jada Shaffer in August and found to have a fracture deformity still present at the distal phalanx of the little toe. She was placed in a short fracture walker. She has been in the walker since. She has some swelling still present over the top of her left little toe, but her pain has improved significantly. She is employed as an . She does a lot of sitting. She is not taking any pain medication at this time. PHYSICAL EXAM:  With the boot removed today, it reveals trace swelling at the IP joint of the left little toe. She has, on effort, minimal flexion and extension of the digit secondary to stiffness. Distal sensation is intact fully to the left lower extremity. Capillary refill is brisk and less than 2 seconds to the left lower extremity. Varus and valgus stressing at the metatarsophalangeal joint, the DIP, and IP reveal no instability but does reproduce a little bit of pain and what the patient reports as stiffness. RADIOGRAPHS:  X-rays today reveal a healed, nondisplaced, fracture of the distal phalanx of the left little toe. PLAN:   The patient is going to wean herself from her short fracture walker. We are going to see her back on a p.r.n. basis. Today, all her questions were answered to her satisfaction. A copy of her x-rays was reviewed and provided.

## 2017-11-06 ENCOUNTER — PATIENT OUTREACH (OUTPATIENT)
Dept: FAMILY MEDICINE CLINIC | Age: 52
End: 2017-11-06

## 2017-11-13 ENCOUNTER — PATIENT OUTREACH (OUTPATIENT)
Dept: FAMILY MEDICINE CLINIC | Age: 52
End: 2017-11-13

## 2017-11-13 NOTE — PROGRESS NOTES
NN health screenings:    still with insurance issues but hopes to hear some good news from her insurance within the next few weeks. We agreed I would call again in January unless I see the results of her repeat mammogram and colonoscopy by then. Verified she has both central scheduling and Dr. Katty Flood phone numbers and she will call to schedule once her insurance is straightened out.

## 2018-02-01 ENCOUNTER — PATIENT OUTREACH (OUTPATIENT)
Dept: FAMILY MEDICINE CLINIC | Age: 53
End: 2018-02-01

## 2018-02-01 NOTE — PROGRESS NOTES
NN health screenings:    Ms Tabitha Rehman is Celia Velásquez dealing with my health insurance. They haven't figured out how to reinstate me and I found out my insurance issues started back in 2017 and now they owe me a refund for all the premiums I paid but I haven't seen that check either\". Ms Tabitha Rehman stated \"don't count me out yet for completing my screenings. I'll get this figured out\". Informed her the referral for mammogram is good until May of this year. The referral for colonoscopy with Dr. Raynette Kawasaki will  in March and to let me know if she needs another referral once she is able to proceed.

## 2018-04-18 ENCOUNTER — TELEPHONE (OUTPATIENT)
Dept: FAMILY MEDICINE CLINIC | Age: 53
End: 2018-04-18

## 2018-04-18 ENCOUNTER — OFFICE VISIT (OUTPATIENT)
Dept: FAMILY MEDICINE CLINIC | Age: 53
End: 2018-04-18

## 2018-04-18 ENCOUNTER — HOSPITAL ENCOUNTER (EMERGENCY)
Age: 53
Discharge: HOME | End: 2018-04-18
Attending: EMERGENCY MEDICINE
Payer: COMMERCIAL

## 2018-04-18 VITALS
WEIGHT: 137 LBS | HEIGHT: 64 IN | SYSTOLIC BLOOD PRESSURE: 231 MMHG | OXYGEN SATURATION: 99 % | HEART RATE: 82 BPM | DIASTOLIC BLOOD PRESSURE: 125 MMHG | TEMPERATURE: 98 F | RESPIRATION RATE: 20 BRPM | BODY MASS INDEX: 23.39 KG/M2

## 2018-04-18 VITALS
TEMPERATURE: 97.8 F | BODY MASS INDEX: 23.05 KG/M2 | HEART RATE: 65 BPM | RESPIRATION RATE: 16 BRPM | WEIGHT: 135 LBS | HEIGHT: 64 IN | DIASTOLIC BLOOD PRESSURE: 103 MMHG | SYSTOLIC BLOOD PRESSURE: 184 MMHG | OXYGEN SATURATION: 99 %

## 2018-04-18 DIAGNOSIS — I10 UNCONTROLLED HYPERTENSION: Primary | ICD-10-CM

## 2018-04-18 DIAGNOSIS — R20.0 NUMBNESS AND TINGLING: ICD-10-CM

## 2018-04-18 DIAGNOSIS — Z12.11 COLON CANCER SCREENING: ICD-10-CM

## 2018-04-18 DIAGNOSIS — M62.830 MUSCLE SPASM OF BACK: ICD-10-CM

## 2018-04-18 DIAGNOSIS — I10 ESSENTIAL HYPERTENSION WITH GOAL BLOOD PRESSURE LESS THAN 140/90: Chronic | ICD-10-CM

## 2018-04-18 DIAGNOSIS — S92.532D: ICD-10-CM

## 2018-04-18 DIAGNOSIS — Z12.39 SCREENING FOR BREAST CANCER: ICD-10-CM

## 2018-04-18 DIAGNOSIS — R20.2 NUMBNESS AND TINGLING: ICD-10-CM

## 2018-04-18 DIAGNOSIS — I10 UNCONTROLLED HYPERTENSION: ICD-10-CM

## 2018-04-18 DIAGNOSIS — I10 HYPERTENSION, UNSPECIFIED TYPE: Primary | ICD-10-CM

## 2018-04-18 DIAGNOSIS — F41.9 ANXIETY: ICD-10-CM

## 2018-04-18 DIAGNOSIS — G43.709 CHRONIC MIGRAINE WITHOUT AURA WITHOUT STATUS MIGRAINOSUS, NOT INTRACTABLE: ICD-10-CM

## 2018-04-18 DIAGNOSIS — R49.0 HOARSENESS: ICD-10-CM

## 2018-04-18 LAB
ANION GAP SERPL CALC-SCNC: 5 MMOL/L (ref 3–18)
BASOPHILS # BLD: 0 K/UL (ref 0–0.06)
BASOPHILS NFR BLD: 0 % (ref 0–3)
BUN SERPL-MCNC: 9 MG/DL (ref 7–18)
BUN/CREAT SERPL: 15 (ref 12–20)
CALCIUM SERPL-MCNC: 8.8 MG/DL (ref 8.5–10.1)
CHLORIDE SERPL-SCNC: 106 MMOL/L (ref 100–108)
CO2 SERPL-SCNC: 27 MMOL/L (ref 21–32)
CREAT SERPL-MCNC: 0.61 MG/DL (ref 0.6–1.3)
DIFFERENTIAL METHOD BLD: ABNORMAL
EOSINOPHIL # BLD: 0.1 K/UL (ref 0–0.4)
EOSINOPHIL NFR BLD: 2 % (ref 0–5)
ERYTHROCYTE [DISTWIDTH] IN BLOOD BY AUTOMATED COUNT: 18.1 % (ref 11.6–14.5)
GLUCOSE SERPL-MCNC: 94 MG/DL (ref 74–99)
HCT VFR BLD AUTO: 29 % (ref 35–45)
HGB BLD-MCNC: 8.9 G/DL (ref 12–16)
LYMPHOCYTES # BLD: 1.5 K/UL (ref 0.8–3.5)
LYMPHOCYTES NFR BLD: 28 % (ref 20–51)
MCH RBC QN AUTO: 20.6 PG (ref 24–34)
MCHC RBC AUTO-ENTMCNC: 30.7 G/DL (ref 31–37)
MCV RBC AUTO: 67.1 FL (ref 74–97)
MONOCYTES # BLD: 0.4 K/UL (ref 0–1)
MONOCYTES NFR BLD: 8 % (ref 2–9)
NEUTS SEG # BLD: 3.3 K/UL (ref 1.8–8)
NEUTS SEG NFR BLD: 62 % (ref 42–75)
PLATELET # BLD AUTO: 276 K/UL (ref 135–420)
PLATELET COMMENTS,PCOM: ABNORMAL
PMV BLD AUTO: 9.9 FL (ref 9.2–11.8)
POTASSIUM SERPL-SCNC: 3 MMOL/L (ref 3.5–5.5)
RBC # BLD AUTO: 4.32 M/UL (ref 4.2–5.3)
RBC MORPH BLD: ABNORMAL
SODIUM SERPL-SCNC: 138 MMOL/L (ref 136–145)
WBC # BLD AUTO: 5.3 K/UL (ref 4.6–13.2)

## 2018-04-18 PROCEDURE — 99284 EMERGENCY DEPT VISIT MOD MDM: CPT

## 2018-04-18 PROCEDURE — 80048 BASIC METABOLIC PNL TOTAL CA: CPT | Performed by: EMERGENCY MEDICINE

## 2018-04-18 PROCEDURE — 85025 COMPLETE CBC W/AUTO DIFF WBC: CPT | Performed by: EMERGENCY MEDICINE

## 2018-04-18 PROCEDURE — 74011250637 HC RX REV CODE- 250/637: Performed by: EMERGENCY MEDICINE

## 2018-04-18 PROCEDURE — 96374 THER/PROPH/DIAG INJ IV PUSH: CPT

## 2018-04-18 PROCEDURE — 93005 ELECTROCARDIOGRAM TRACING: CPT

## 2018-04-18 PROCEDURE — 74011250636 HC RX REV CODE- 250/636: Performed by: EMERGENCY MEDICINE

## 2018-04-18 RX ORDER — POTASSIUM CHLORIDE 1.5 G/1.77G
40 POWDER, FOR SOLUTION ORAL
Status: COMPLETED | OUTPATIENT
Start: 2018-04-18 | End: 2018-04-18

## 2018-04-18 RX ORDER — TOPIRAMATE 50 MG/1
50 TABLET, FILM COATED ORAL 2 TIMES DAILY
Qty: 60 TAB | Refills: 3 | Status: SHIPPED | OUTPATIENT
Start: 2018-04-18 | End: 2019-06-10 | Stop reason: SDUPTHER

## 2018-04-18 RX ORDER — GABAPENTIN 100 MG/1
CAPSULE ORAL
Qty: 210 CAP | Refills: 1 | Status: CANCELLED | OUTPATIENT
Start: 2018-04-18

## 2018-04-18 RX ORDER — AMLODIPINE BESYLATE 10 MG/1
10 TABLET ORAL
Status: COMPLETED | OUTPATIENT
Start: 2018-04-18 | End: 2018-04-18

## 2018-04-18 RX ORDER — LISINOPRIL 20 MG/1
40 TABLET ORAL
Status: COMPLETED | OUTPATIENT
Start: 2018-04-18 | End: 2018-04-18

## 2018-04-18 RX ORDER — HYDROCHLOROTHIAZIDE 25 MG/1
25 TABLET ORAL DAILY
Qty: 30 TAB | Refills: 3 | Status: SHIPPED | OUTPATIENT
Start: 2018-04-18 | End: 2018-09-26 | Stop reason: SDUPTHER

## 2018-04-18 RX ORDER — METOPROLOL TARTRATE 50 MG/1
100 TABLET ORAL EVERY 12 HOURS
Status: DISCONTINUED | OUTPATIENT
Start: 2018-04-18 | End: 2018-04-18 | Stop reason: HOSPADM

## 2018-04-18 RX ORDER — METOPROLOL TARTRATE 50 MG/1
100 TABLET ORAL EVERY 12 HOURS
Status: DISCONTINUED | OUTPATIENT
Start: 2018-04-18 | End: 2018-04-18

## 2018-04-18 RX ORDER — IBUPROFEN 800 MG/1
800 TABLET ORAL
Qty: 90 TAB | Refills: 0 | Status: SHIPPED | OUTPATIENT
Start: 2018-04-18 | End: 2018-06-11 | Stop reason: SDUPTHER

## 2018-04-18 RX ORDER — AMITRIPTYLINE HYDROCHLORIDE 25 MG/1
25 TABLET, FILM COATED ORAL
Qty: 30 TAB | Refills: 3 | Status: CANCELLED | OUTPATIENT
Start: 2018-04-18

## 2018-04-18 RX ORDER — METOPROLOL TARTRATE 100 MG/1
100 TABLET ORAL 2 TIMES DAILY
Qty: 180 TAB | Refills: 1 | Status: SHIPPED | OUTPATIENT
Start: 2018-04-18 | End: 2018-09-26 | Stop reason: SDUPTHER

## 2018-04-18 RX ORDER — LISINOPRIL 40 MG/1
40 TABLET ORAL DAILY
Qty: 90 TAB | Refills: 1 | Status: SHIPPED | OUTPATIENT
Start: 2018-04-18 | End: 2018-09-26 | Stop reason: SDUPTHER

## 2018-04-18 RX ORDER — HYDRALAZINE HYDROCHLORIDE 25 MG/1
25 TABLET, FILM COATED ORAL 3 TIMES DAILY
Qty: 270 TAB | Refills: 1 | Status: SHIPPED | OUTPATIENT
Start: 2018-04-18 | End: 2018-04-30 | Stop reason: DRUGHIGH

## 2018-04-18 RX ORDER — CYCLOBENZAPRINE HCL 10 MG
10 TABLET ORAL
Qty: 30 TAB | Refills: 0 | Status: SHIPPED | OUTPATIENT
Start: 2018-04-18 | End: 2018-06-01

## 2018-04-18 RX ORDER — CITALOPRAM 10 MG/1
10 TABLET ORAL DAILY
Qty: 90 TAB | Refills: 1 | Status: SHIPPED | OUTPATIENT
Start: 2018-04-18 | End: 2018-08-13

## 2018-04-18 RX ORDER — AMLODIPINE BESYLATE 10 MG/1
10 TABLET ORAL DAILY
Qty: 90 TAB | Refills: 1 | Status: SHIPPED | OUTPATIENT
Start: 2018-04-18 | End: 2018-09-26 | Stop reason: SDUPTHER

## 2018-04-18 RX ORDER — IBUPROFEN 800 MG/1
800 TABLET ORAL
Qty: 180 TAB | Refills: 0 | Status: CANCELLED | OUTPATIENT
Start: 2018-04-18

## 2018-04-18 RX ORDER — LABETALOL HCL 20 MG/4 ML
20 SYRINGE (ML) INTRAVENOUS
Status: COMPLETED | OUTPATIENT
Start: 2018-04-18 | End: 2018-04-18

## 2018-04-18 RX ADMIN — LISINOPRIL 40 MG: 20 TABLET ORAL at 15:57

## 2018-04-18 RX ADMIN — LABETALOL 20 MG/4 ML (5 MG/ML) INTRAVENOUS SYRINGE 20 MG: at 17:13

## 2018-04-18 RX ADMIN — METOPROLOL TARTRATE 100 MG: 50 TABLET ORAL at 16:17

## 2018-04-18 RX ADMIN — POTASSIUM CHLORIDE 40 MEQ: 1.5 POWDER, FOR SOLUTION ORAL at 16:16

## 2018-04-18 RX ADMIN — AMLODIPINE BESYLATE 10 MG: 10 TABLET ORAL at 15:57

## 2018-04-18 NOTE — PROGRESS NOTES
HISTORY OF PRESENT ILLNESS  Yessy Stewart is a 46 y.o. female. HPI  Yessy Stewart is a 46 y.o. female who presents to the office today for HTN and Medication. She ran out of all of her medication at least 1 month ago due to an issue with health insurance. HTN- very elevated. She does have headaches occasionally with blurred vision. Denies CP or SOB. She was taking HCTZ, lisinopril, norvasc, metoprolol, and hydralazine. Does not monitor BP at home. Anxiety- was taking celexa 10mg but ran out of medication. Hoarse- today while at work she felt an irritation in her throat and when she went to talk, she had no voice. Denies congestion, cough, URI symptoms. She is not taking any medication for this other than drinking hot coffee. Chief Complaint   Patient presents with    Hypertension     follow up     Medication Refill     pt has been out of all her medications due to no insurance coverage pt has insurance now \"     Hoarse     pt states \" that pt voice just got like this about  few hours ago. pt states \" that pt has no cold or cough throat did feel a little funny this morning but no pian \"        No current outpatient prescriptions on file prior to visit. No current facility-administered medications on file prior to visit.       No Known Allergies  Past Medical History:   Diagnosis Date    Abnormal mammogram 3/2016    BIRADS 3 right breast    Hypertension     Stroke ([facility] [address] 8/1/2016    CVA     History   Smoking Status    Former Smoker    Quit date: 11/16/1999   Smokeless Tobacco    Never Used     History   Alcohol Use No     Family History   Problem Relation Age of Onset    No Known Problems Mother     No Known Problems Father     No Known Problems Sister     No Known Problems Brother     No Known Problems Maternal Aunt     No Known Problems Maternal Uncle     No Known Problems Paternal Aunt     No Known Problems Paternal Uncle     No Known Problems Maternal Grandmother     No Known Problems Paternal Grandmother     No Known Problems Paternal Grandfather     No Known Problems Other      Review of Systems   Constitutional: Negative for chills, fever and malaise/fatigue. HENT: Negative for congestion, ear pain and sore throat. Eyes: Negative for blurred vision and double vision. Respiratory: Negative for cough and shortness of breath. Cardiovascular: Negative for chest pain, palpitations, orthopnea and leg swelling. Gastrointestinal: Negative for abdominal pain, nausea and vomiting. Musculoskeletal: Negative for falls. Skin: Negative for rash. Neurological: Negative for dizziness, tingling, speech change, focal weakness, loss of consciousness and headaches. Endo/Heme/Allergies: Negative for environmental allergies. Does not bruise/bleed easily. Psychiatric/Behavioral: The patient is nervous/anxious. Visit Vitals    BP (!) 231/125    Pulse 82    Temp 98 °F (36.7 °C) (Oral)    Resp 20    Ht 5' 4\" (1.626 m)    Wt 137 lb (62.1 kg)    SpO2 99%    BMI 23.52 kg/m2     Physical Exam   Constitutional: She is oriented to person, place, and time. She appears well-developed and well-nourished. No distress. HENT:   Mouth/Throat: Uvula is midline, oropharynx is clear and moist and mucous membranes are normal.   Neck: Neck supple. No JVD present. No thyromegaly present. Cardiovascular: Normal rate, regular rhythm and normal heart sounds. Pulmonary/Chest: Effort normal and breath sounds normal. No respiratory distress. She has no wheezes. She has no rales. Musculoskeletal: She exhibits no edema. Lymphadenopathy:     She has no cervical adenopathy. Neurological: She is alert and oriented to person, place, and time. Skin: Skin is warm and dry. Psychiatric: She has a normal mood and affect. Her behavior is normal. Thought content normal.   Nursing note and vitals reviewed. ASSESSMENT and PLAN    ICD-10-CM ICD-9-CM    1.  Uncontrolled hypertension I10 401.9 CBC WITH AUTOMATED DIFF      METABOLIC PANEL, COMPREHENSIVE      LIPID PANEL      TSH AND FREE T4   2. Essential hypertension with goal blood pressure less than 140/90 I10 401.9 amLODIPine (NORVASC) 10 mg tablet      hydrALAZINE (APRESOLINE) 25 mg tablet      lisinopril (PRINIVIL, ZESTRIL) 40 mg tablet      metoprolol tartrate (LOPRESSOR) 100 mg IR tablet      hydroCHLOROthiazide (HYDRODIURIL) 25 mg tablet      CBC WITH AUTOMATED DIFF      METABOLIC PANEL, COMPREHENSIVE      LIPID PANEL      TSH AND FREE T4   3. Muscle spasm of back M62.830 724.8 amLODIPine (NORVASC) 10 mg tablet      hydrALAZINE (APRESOLINE) 25 mg tablet      lisinopril (PRINIVIL, ZESTRIL) 40 mg tablet      metoprolol tartrate (LOPRESSOR) 100 mg IR tablet   4. Anxiety F41.9 300.00 amLODIPine (NORVASC) 10 mg tablet      hydrALAZINE (APRESOLINE) 25 mg tablet      lisinopril (PRINIVIL, ZESTRIL) 40 mg tablet      metoprolol tartrate (LOPRESSOR) 100 mg IR tablet      citalopram (CELEXA) 10 mg tablet      CBC WITH AUTOMATED DIFF      METABOLIC PANEL, COMPREHENSIVE      TSH AND FREE T4   5. Closed non-physeal fracture of distal phalanx of lesser toe of left foot with routine healing, subsequent encounter S92.532D V54.19    6. Numbness and tingling R20.0 782.0 CBC WITH AUTOMATED DIFF    Q40.4  METABOLIC PANEL, COMPREHENSIVE   7. Chronic migraine without aura without status migrainosus, not intractable G43.709 346.70 topiramate (TOPAMAX) 50 mg tablet   8. Hoarseness R49.0 784.42    9. Screening for breast cancer Z12.31 V76.10 DEON MAMMO BI SCREENING INCL CAD   8. Colon cancer screening Z12.11 V76.51 REFERRAL TO GASTROENTEROLOGY      Repeat manual BP was 240/130. I suggested she go directly to [facility] ED for accelerated hypertension. We discussed the serious outcomes of uncontrolled BP including stroke and MI. She agrees and understands to go to the ED. I have refilled her medication and sent them to her pharmacy. Please follow up in 2 weeks for HTN.   Hoarse- drink hot tea with honey, throat lozenges and voice rest. RTC if there is no improvement in 1-2 weeks. Referrals sent for mammogram and colonoscopy. Reviewed medication and side effects. Patient agrees with the plan and verbalizes understanding. Follow-up Disposition:  Return in about 2 weeks (around 5/2/2018) for HTN.     Lul Alston PA-C  4/18/2018

## 2018-04-18 NOTE — DISCHARGE INSTRUCTIONS
High Blood Pressure: Care Instructions  Your Care Instructions    If your blood pressure is usually above 140/90, you have high blood pressure, or hypertension. That means the top number is 140 or higher or the bottom number is 90 or higher, or both. Despite what a lot of people think, high blood pressure usually doesn't cause headaches or make you feel dizzy or lightheaded. It usually has no symptoms. But it does increase your risk for heart attack, stroke, and kidney or eye damage. The higher your blood pressure, the more your risk increases. Your doctor will give you a goal for your blood pressure. Your goal will be based on your health and your age. An example of a goal is to keep your blood pressure below 140/90. Lifestyle changes, such as eating healthy and being active, are always important to help lower blood pressure. You might also take medicine to reach your blood pressure goal.  Follow-up care is a key part of your treatment and safety. Be sure to make and go to all appointments, and call your doctor if you are having problems. It's also a good idea to know your test results and keep a list of the medicines you take. How can you care for yourself at home? Medical treatment  · If you stop taking your medicine, your blood pressure will go back up. You may take one or more types of medicine to lower your blood pressure. Be safe with medicines. Take your medicine exactly as prescribed. Call your doctor if you think you are having a problem with your medicine. · Talk to your doctor before you start taking aspirin every day. Aspirin can help certain people lower their risk of a heart attack or stroke. But taking aspirin isn't right for everyone, because it can cause serious bleeding. · See your doctor regularly. You may need to see the doctor more often at first or until your blood pressure comes down.   · If you are taking blood pressure medicine, talk to your doctor before you take decongestants or anti-inflammatory medicine, such as ibuprofen. Some of these medicines can raise blood pressure. · Learn how to check your blood pressure at home. Lifestyle changes  · Stay at a healthy weight. This is especially important if you put on weight around the waist. Losing even 10 pounds can help you lower your blood pressure. · If your doctor recommends it, get more exercise. Walking is a good choice. Bit by bit, increase the amount you walk every day. Try for at least 30 minutes on most days of the week. You also may want to swim, bike, or do other activities. · Avoid or limit alcohol. Talk to your doctor about whether you can drink any alcohol. · Try to limit how much sodium you eat to less than 2,300 milligrams (mg) a day. Your doctor may ask you to try to eat less than 1,500 mg a day. · Eat plenty of fruits (such as bananas and oranges), vegetables, legumes, whole grains, and low-fat dairy products. · Lower the amount of saturated fat in your diet. Saturated fat is found in animal products such as milk, cheese, and meat. Limiting these foods may help you lose weight and also lower your risk for heart disease. · Do not smoke. Smoking increases your risk for heart attack and stroke. If you need help quitting, talk to your doctor about stop-smoking programs and medicines. These can increase your chances of quitting for good. When should you call for help? Call 911 anytime you think you may need emergency care. This may mean having symptoms that suggest that your blood pressure is causing a serious heart or blood vessel problem. Your blood pressure may be over 180/110. ? For example, call 911 if:  ? · You have symptoms of a heart attack. These may include:  ¨ Chest pain or pressure, or a strange feeling in the chest.  ¨ Sweating. ¨ Shortness of breath. ¨ Nausea or vomiting.   ¨ Pain, pressure, or a strange feeling in the back, neck, jaw, or upper belly or in one or both shoulders or arms.  ¨ Lightheadedness or sudden weakness. ¨ A fast or irregular heartbeat. ? · You have symptoms of a stroke. These may include:  ¨ Sudden numbness, tingling, weakness, or loss of movement in your face, arm, or leg, especially on only one side of your body. ¨ Sudden vision changes. ¨ Sudden trouble speaking. ¨ Sudden confusion or trouble understanding simple statements. ¨ Sudden problems with walking or balance. ¨ A sudden, severe headache that is different from past headaches. ? · You have severe back or belly pain. ?Do not wait until your blood pressure comes down on its own. Get help right away. ?Call your doctor now or seek immediate care if:  ? · Your blood pressure is much higher than normal (such as 180/110 or higher), but you don't have symptoms. ? · You think high blood pressure is causing symptoms, such as:  ¨ Severe headache. ¨ Blurry vision. ? Watch closely for changes in your health, and be sure to contact your doctor if:  ? · Your blood pressure measures 140/90 or higher at least 2 times. That means the top number is 140 or higher or the bottom number is 90 or higher, or both. ? · You think you may be having side effects from your blood pressure medicine. ? · Your blood pressure is usually normal, but it goes above normal at least 2 times. Where can you learn more? Go to http://collette-grecia.info/. Enter S911 in the search box to learn more about \"High Blood Pressure: Care Instructions. \"  Current as of: September 21, 2016  Content Version: 11.4  © 4144-3245 [employer]. Care instructions adapted under license by [employer] (which disclaims liability or warranty for this information). If you have questions about a medical condition or this instruction, always ask your healthcare professional. [place] [address] any warranty or liability for your use of this information.

## 2018-04-18 NOTE — MR AVS SNAPSHOT
[address] [address] 
[phone] Patient: Nikki Lo MRN: [id number] :1965 Visit Information Date & Time Provider Department Dept. Phone Encounter #  
 2018  1:30 PM [address], [address] [place] [address] [id number] Follow-up Instructions Return in about 2 weeks (around 2018) for HTN. Upcoming Health Maintenance Date Due DTaP/Tdap/Td series (1 - Tdap) 1986 FOBT Q 1 YEAR AGE 50-75 3/2/2017 BREAST CANCER SCRN MAMMOGRAM 2017 Influenza Age 5 to Adult 2017 PAP AKA CERVICAL CYTOLOGY 2020 Allergies as of 2018  Review Complete On: 2018 By: oTyin Ceballos No Known Allergies Current Immunizations  Reviewed on 2016 No immunizations on file. Not reviewed this visit You Were Diagnosed With   
  
 Codes Comments Uncontrolled hypertension    -  Primary ICD-10-CM: I10 
ICD-9-CM: 401.9 Essential hypertension with goal blood pressure less than 140/90     ICD-10-CM: I10 
ICD-9-CM: 401.9 Muscle spasm of back     ICD-10-CM: M71.236 ICD-9-CM: 724.8 Anxiety     ICD-10-CM: F41.9 ICD-9-CM: 300.00 Closed non-physeal fracture of distal phalanx of lesser toe of left foot with routine healing, subsequent encounter     ICD-10-CM: N22.688F ICD-9-CM: V54.19 Numbness and tingling     ICD-10-CM: R20.0, R20.2 ICD-9-CM: 101. 0 Chronic migraine without aura without status migrainosus, not intractable     ICD-10-CM: W75.178 ICD-9-CM: 346.70 Vitals BP Pulse Temp Resp Height(growth percentile) Weight(growth percentile) (!) 231/125 82 98 °F (36.7 °C) (Oral) 20 5' 4\" (1.626 m) 137 lb (62.1 kg) SpO2 BMI OB Status Smoking Status 99% 23.52 kg/m2 Having regular periods Former Smoker Vitals History BMI and BSA Data  Body Mass Index Body Surface Area  
 23.52 kg/m 2 1.67 m 2  
  
 Preferred Pharmacy Pharmacy Name Phone [facility] [address] [place] [address] [address] [phone] Your Updated Medication List  
  
   
This list is accurate as of 4/18/18  2:12 PM.  Always use your most recent med list.  
  
  
  
  
 amitriptyline 25 mg tablet Commonly known as:  ELAVIL Take 1 Tab by mouth nightly. amLODIPine 10 mg tablet Commonly known as:  Kt Butcher Take 1 Tab by mouth daily. Indications: hypertension  
  
 butalbital-aspirin-caffeine capsule Commonly known as:  Era Armando Take 1 Cap by mouth every four (4) hours as needed (headache). Max Daily Amount: 6 Caps. citalopram 10 mg tablet Commonly known as:  Loyd Hyattles Take 1 Tab by mouth daily. gabapentin 100 mg capsule Commonly known as:  NEURONTIN  
1 twice a day for 2 days then 1 three times a day for three days then 2 three times a day for 7 days then 3 three times a day  
  
 hydrALAZINE 25 mg tablet Commonly known as:  APRESOLINE Take 1 Tab by mouth three (3) times daily. hydroCHLOROthiazide 25 mg tablet Commonly known as:  HYDRODIURIL Take 1 Tab by mouth daily. ibuprofen 800 mg tablet Commonly known as:  MOTRIN Take 1 Tab by mouth every eight (8) hours as needed for Pain. lisinopril 40 mg tablet Commonly known as:  Tildon Kim Take 1 Tab by mouth daily. metoprolol tartrate 100 mg IR tablet Commonly known as:  LOPRESSOR Take 1 Tab by mouth two (2) times a day. oxyCODONE-acetaminophen 5-325 mg per tablet Commonly known as:  PERCOCET Take 1 Tab by mouth every eight (8) hours as needed for Pain. Max Daily Amount: 3 Tabs. topiramate 50 mg tablet Commonly known as:  TOPAMAX Take 1 Tab by mouth two (2) times a day. Follow-up Instructions Return in about 2 weeks (around 5/2/2018) for HTN. Patient Instructions Acute High Blood Pressure: Care Instructions Your Care Instructions Acute high blood pressure is very high blood pressure. It's a serious problem. Very high blood pressure can damage your brain, heart, eyes, and kidneys. You may have been given medicines to lower your blood pressure. You may have gotten them as pills or through a needle in one of your veins. This is called an IV. And maybe you were given other medicines too. These can be needed when high blood pressure causes other problems. To keep your blood pressure at a lower level, you may need to make healthy lifestyle changes. And you will probably need to take medicines. Be sure to follow up with your doctor about your blood pressure and what you can do about it. Follow-up care is a key part of your treatment and safety. Be sure to make and go to all appointments, and call your doctor if you are having problems. It's also a good idea to know your test results and keep a list of the medicines you take. How can you care for yourself at home? · See your doctor as often as he or she recommends. This is to make sure your blood pressure is under control. You will probably go at least 2 times a year. But it may be more often at first. 
· Take your blood pressure medicine exactly as prescribed. You may take one or more types. They include diuretics, beta-blockers, ACE inhibitors, calcium channel blockers, and angiotensin II receptor blockers. Call your doctor if you think you are having a problem with your medicine. · If you take blood pressure medicine, talk to your doctor before you take decongestants or anti-inflammatory medicine, such as ibuprofen. These can raise blood pressure. · Learn how to check your blood pressure at home. Check it often. · Ask your doctor if it's okay to drink alcohol. · Talk to your doctor about lifestyle changes that can help blood pressure. These include being active and not smoking. When should you call for help? Call 911 anytime you think you may need emergency care.  This may mean having symptoms that suggest that your blood pressure is causing a serious heart or blood vessel problem. Your blood pressure may be over 180/110. ? For example, call 911 if: 
? · You have symptoms of a heart attack. These may include: ¨ Chest pain or pressure, or a strange feeling in the chest. 
¨ Sweating. ¨ Shortness of breath. ¨ Nausea or vomiting. ¨ Pain, pressure, or a strange feeling in the back, neck, jaw, or upper belly or in one or both shoulders or arms. ¨ Lightheadedness or sudden weakness. ¨ A fast or irregular heartbeat. ? · You have symptoms of a stroke. These may include: 
¨ Sudden numbness, tingling, weakness, or loss of movement in your face, arm, or leg, especially on only one side of your body. ¨ Sudden vision changes. ¨ Sudden trouble speaking. ¨ Sudden confusion or trouble understanding simple statements. ¨ Sudden problems with walking or balance. ¨ A sudden, severe headache that is different from past headaches. ? · You have severe back or belly pain. ?Do not wait until your blood pressure comes down on its own. Get help right away. ?Call your doctor now or seek immediate care if: 
? · Your blood pressure is much higher than normal (such as 180/110 or higher), but you don't have symptoms. ? · You think high blood pressure is causing symptoms, such as: ¨ Severe headache. ¨ Blurry vision. ? Watch closely for changes in your health, and be sure to contact your doctor if: 
? · Your blood pressure measures 140/90 or higher at least 2 times. That means the top number is 140 or higher or the bottom number is 90 or higher, or both. ? · You think you may be having side effects from your blood pressure medicine. ? · Your blood pressure is usually normal, but it goes above normal at least 2 times. Where can you learn more? Go to http://collette-grecia.info/. Enter Z518 in the search box to learn more about \"Acute High Blood Pressure: Care Instructions. \" 
 Current as of: September 21, 2016 Content Version: 11.4 © 7258-1850 [employer], [employer]. Care instructions adapted under license by [employer] (which disclaims liability or warranty for this information). If you have questions about a medical condition or this instruction, always ask your healthcare professional. [address] any warranty or liability for your use of this information. Introducing [facility]! Dear Luis Marcus: 
Thank you for requesting a [facility] account. Our records indicate that you already have an active [employer] account. You can access your account anytime at [email or web address]. [email or web address] Did you know that you can access your hospital and ER discharge instructions at any time in [employer]? You can also review all of your test results from your hospital stay or ER visit. Additional Information If you have questions, please visit the Frequently Asked Questions section of the [employer] website at [email or web address] Remember, [employer] is NOT to be used for urgent needs. For medical emergencies, dial 911. Now available from your iPhone and Android! Please provide this summary of care documentation to your next provider. Your primary care clinician is listed as Dequan Parra. If you have any questions after today's visit, please call [phone].

## 2018-04-18 NOTE — TELEPHONE ENCOUNTER
2018  6:22 PM    Chief Complaint   Patient presents with    Medication Refill       Patient called in through answering service at this time. Identified patient by name and . Patient reports being seen earlier today at [facility]. by DASHAWN Villagomez- this is noted in chart. She states that she asked about getting Percocet, Flexeril, and Ibuprofen renewed but this was not at the pharmacy. Requesting refills. Informed that I can send in a temporary supply of Flexeril and Ibuprofen but that Percocet is not able to be done through answering service. She expressed understanding and will call [facility] during the next business day to discuss.          Lab Results   Component Value Date/Time    Creatinine 0.61 2018 03:15 PM

## 2018-04-18 NOTE — ED PROVIDER NOTES
EMERGENCY DEPARTMENT HISTORY AND PHYSICAL EXAM    2:58 PM      Date: 4/18/2018  Patient Name: Darrell Soriano    History of Presenting Illness     Chief Complaint   Patient presents with    Hypertension         History Provided By: Patient    Chief Complaint: Elevated BP  Duration:  Hours  Timing:  Acute  Location: N/A  Modifying Factors: Pt ran out of her HTN medication 1 month ago  Associated Symptoms: HA, voice change       Additional History (Context):     Darrell Soriano is a 46 y.o. female with a pertinent history of HTN, CVA, presenting to the ED for evaluation of elevated BP today. Pt explains she went to Jasvir DUNNE)'s office for a refill of her HTN medications and sudden onset voice change, but she ended up being sent to the ED for elevated BP. She has been out of her HTN medications for 1 month. Pt also reports she had a HA this morning. Her voice change started this morning. States her throat \"feels a little dry. \" Pt denies fever, chills, difficulty swallowing, SOB. No other acute symptoms or complaints were noted. PCP: Vandana Brown MD    Current Facility-Administered Medications   Medication Dose Route Frequency Provider Last Rate Last Dose    metoprolol tartrate (LOPRESSOR) tablet 100 mg  100 mg Oral Q12H Aydee Castanon MD   100 mg at 04/18/18 1617     Current Outpatient Prescriptions   Medication Sig Dispense Refill    amLODIPine (NORVASC) 10 mg tablet Take 1 Tab by mouth daily. Indications: hypertension 90 Tab 1    hydrALAZINE (APRESOLINE) 25 mg tablet Take 1 Tab by mouth three (3) times daily. 270 Tab 1    lisinopril (PRINIVIL, ZESTRIL) 40 mg tablet Take 1 Tab by mouth daily. 90 Tab 1    metoprolol tartrate (LOPRESSOR) 100 mg IR tablet Take 1 Tab by mouth two (2) times a day. 180 Tab 1    hydroCHLOROthiazide (HYDRODIURIL) 25 mg tablet Take 1 Tab by mouth daily. 30 Tab 3    citalopram (CELEXA) 10 mg tablet Take 1 Tab by mouth daily.  90 Tab 1    topiramate (TOPAMAX) 50 mg tablet Take 1 Tab by mouth two (2) times a day. 61 Tab 3       Past History     Past Medical History:  Past Medical History:   Diagnosis Date    Abnormal mammogram 3/2016    BIRADS 3 right breast    Hypertension     Stroke ([address] 2016    CVA       Past Surgical History:  Past Surgical History:   Procedure Laterality Date    HX GYN  1982            Family History:  Family History   Problem Relation Age of Onset    No Known Problems Mother     No Known Problems Father     No Known Problems Sister     No Known Problems Brother     No Known Problems Maternal Aunt     No Known Problems Maternal Uncle     No Known Problems Paternal Aunt     No Known Problems Paternal Uncle     No Known Problems Maternal Grandmother     No Known Problems Paternal Grandmother     No Known Problems Paternal Grandfather     No Known Problems Other        Social History:  Social History   Substance Use Topics    Smoking status: Former Smoker     Quit date: 1999    Smokeless tobacco: Never Used    Alcohol use No       Allergies:  No Known Allergies      Review of Systems       Review of Systems   Constitutional: Negative for chills and fever. Elevated BP   HENT: Positive for voice change. Negative for trouble swallowing. Throat \"feels a little dry. \"   Respiratory: Negative for shortness of breath. Cardiovascular: Negative for chest pain. Gastrointestinal: Negative for diarrhea, nausea and vomiting. Neurological: Positive for headaches. All other systems reviewed and are negative. Physical Exam     Visit Vitals    BP (!) 184/103    Pulse 65    Temp 97.8 °F (36.6 °C)    Resp 16    Ht 5' 4\" (1.626 m)    Wt 61.2 kg (135 lb)    SpO2 99%    BMI 23.17 kg/m2         Physical Exam   Constitutional: She is oriented to person, place, and time. She appears well-developed and well-nourished. No distress. HENT:   Head: Normocephalic and atraumatic.   hoarse, high pitched voice. Eyes: Conjunctivae and EOM are normal. Right eye exhibits no discharge. Left eye exhibits no discharge. No scleral icterus. Neck: Normal range of motion. Neck supple. No tracheal deviation present. Cardiovascular: Normal rate, regular rhythm and normal heart sounds. No murmur heard. Pulmonary/Chest: Effort normal and breath sounds normal. No respiratory distress. She has no wheezes. She has no rales. Abdominal: Soft. She exhibits no distension. There is no tenderness. There is no rebound and no guarding. Musculoskeletal: Normal range of motion. She exhibits no edema or deformity. Lymphadenopathy:   Left anterior cervical lymph node   Neurological: She is alert and oriented to person, place, and time. No cranial nerve deficit. Skin: Skin is warm and dry. She is not diaphoretic. Psychiatric: She has a normal mood and affect.  Her behavior is normal. Judgment and thought content normal.         Diagnostic Study Results     Labs -  Recent Results (from the past 12 hour(s))   EKG, 12 LEAD, INITIAL    Collection Time: 04/18/18  3:12 PM   Result Value Ref Range    Ventricular Rate 61 BPM    Atrial Rate 61 BPM    P-R Interval 146 ms    QRS Duration 88 ms    Q-T Interval 436 ms    QTC Calculation (Bezet) 438 ms    Calculated P Axis 28 degrees    Calculated R Axis 7 degrees    Calculated T Axis 62 degrees    Diagnosis       Normal sinus rhythm  Possible Left atrial enlargement  Left ventricular hypertrophy  Anteroseptal infarct (cited on or before 01-AUG-2016)  Abnormal ECG  When compared with ECG of 01-AUG-2016 13:45,  No significant change was found     CBC WITH AUTOMATED DIFF    Collection Time: 04/18/18  3:15 PM   Result Value Ref Range    WBC 5.3 4.6 - 13.2 K/uL    RBC 4.32 4.20 - 5.30 M/uL    HGB 8.9 (L) 12.0 - 16.0 g/dL    HCT 29.0 (L) 35.0 - 45.0 %    MCV 67.1 (L) 74.0 - 97.0 FL    MCH 20.6 (L) 24.0 - 34.0 PG    MCHC 30.7 (L) 31.0 - 37.0 g/dL    RDW 18.1 (H) 11.6 - 14.5 %    PLATELET 928 296 - 420 K/uL    MPV 9.9 9.2 - 11.8 FL    NEUTROPHILS 62 42 - 75 %    LYMPHOCYTES 28 20 - 51 %    MONOCYTES 8 2 - 9 %    EOSINOPHILS 2 0 - 5 %    BASOPHILS 0 0 - 3 %    ABS. NEUTROPHILS 3.3 1.8 - 8.0 K/UL    ABS. LYMPHOCYTES 1.5 0.8 - 3.5 K/UL    ABS. MONOCYTES 0.4 0 - 1.0 K/UL    ABS. EOSINOPHILS 0.1 0.0 - 0.4 K/UL    ABS. BASOPHILS 0.0 0.0 - 0.06 K/UL    DF MANUAL      PLATELET COMMENTS ADEQUATE PLATELETS      RBC COMMENTS ANISOCYTOSIS  1+       METABOLIC PANEL, BASIC    Collection Time: 04/18/18  3:15 PM   Result Value Ref Range    Sodium 138 136 - 145 mmol/L    Potassium 3.0 (L) 3.5 - 5.5 mmol/L    Chloride 106 100 - 108 mmol/L    CO2 27 21 - 32 mmol/L    Anion gap 5 3.0 - 18 mmol/L    Glucose 94 74 - 99 mg/dL    BUN 9 7.0 - 18 MG/DL    Creatinine 0.61 0.6 - 1.3 MG/DL    BUN/Creatinine ratio 15 12 - 20      GFR est AA >60 >60 ml/min/1.73m2    GFR est non-AA >60 >60 ml/min/1.73m2    Calcium 8.8 8.5 - 10.1 MG/DL       Radiologic Studies -   No orders to display         Medical Decision Making   I am the first provider for this patient. I reviewed the vital signs, available nursing notes, past medical history, past surgical history, family history and social history. Vital Signs-Reviewed the patient's vital signs. Pulse Oximetry Analysis -  99% on room air (Interpretation)    EKG: Interpreted by the EP. Time Interpreted: 15:12   Rate: 61   Rhythm: Normal Sinus Rhythm    Interpretation: Q-waves in leads V1-V3. no acute ST or T-wave changes suggestive of acute ischemia. Records Reviewed: Nursing Notes (Time of Review: 2:58 PM)      Provider Notes (Medical Decision Making): Pt with HTN, compliant with meds for the past month. BP improved after medications. No signs of malignant HTN or end organ damage. Will be discharged with oral BP medication. Voice change appears to be due to laryngitis. Recommended supportive care. Diagnosis     Clinical Impression:   1.  Hypertension, unspecified type Disposition: Discharged     Follow-up Information     Follow up With Details Comments Angela Bennett MD Schedule an appointment as soon as possible for a visit      [facility] EMERGENCY DEPT  If symptoms worsen [address] [facility] [id number]  [phone]           Discharge Medication List as of 4/18/2018  5:47 PM      CONTINUE these medications which have CHANGED    Details   amLODIPine (NORVASC) 10 mg tablet Take 1 Tab by mouth daily. Indications: hypertension, Normal, Disp-90 Tab, R-1      hydrALAZINE (APRESOLINE) 25 mg tablet Take 1 Tab by mouth three (3) times daily. , Normal, Disp-270 Tab, R-1      lisinopril (PRINIVIL, ZESTRIL) 40 mg tablet Take 1 Tab by mouth daily. , Normal, Disp-90 Tab, R-1      metoprolol tartrate (LOPRESSOR) 100 mg IR tablet Take 1 Tab by mouth two (2) times a day., Normal, Disp-180 Tab, R-1      hydroCHLOROthiazide (HYDRODIURIL) 25 mg tablet Take 1 Tab by mouth daily. , Normal, Disp-30 Tab, R-3      citalopram (CELEXA) 10 mg tablet Take 1 Tab by mouth daily. , Normal, Disp-90 Tab, R-1      topiramate (TOPAMAX) 50 mg tablet Take 1 Tab by mouth two (2) times a day., Normal, Disp-60 Tab, R-3         STOP taking these medications       oxyCODONE-acetaminophen (PERCOCET) 5-325 mg per tablet Comments:   Reason for Stopping:         ibuprofen (MOTRIN) 800 mg tablet Comments:   Reason for Stopping:         gabapentin (NEURONTIN) 100 mg capsule Comments:   Reason for Stopping:         amitriptyline (ELAVIL) 25 mg tablet Comments:   Reason for Stopping:         butalbital-aspirin-caffeine Lum Darting) capsule Comments:   Reason for Stopping:             _______________________________    Attestations:  Scribe Attestation     Arnav Adame acting as a scribe for and in the presence of Katia Chiu MD      April 18, 2018 at 2:58 PM       Provider Attestation:      I personally performed the services described in the documentation, reviewed the documentation, as recorded by the scribe in my presence, and it accurately and completely records my words and actions.  April 18, 2018 at 2:58 PM - Asya Jonas MD    _______________________________

## 2018-04-18 NOTE — PATIENT INSTRUCTIONS
Acute High Blood Pressure: Care Instructions  Your Care Instructions    Acute high blood pressure is very high blood pressure. It's a serious problem. Very high blood pressure can damage your brain, heart, eyes, and kidneys. You may have been given medicines to lower your blood pressure. You may have gotten them as pills or through a needle in one of your veins. This is called an IV. And maybe you were given other medicines too. These can be needed when high blood pressure causes other problems. To keep your blood pressure at a lower level, you may need to make healthy lifestyle changes. And you will probably need to take medicines. Be sure to follow up with your doctor about your blood pressure and what you can do about it. Follow-up care is a key part of your treatment and safety. Be sure to make and go to all appointments, and call your doctor if you are having problems. It's also a good idea to know your test results and keep a list of the medicines you take. How can you care for yourself at home? · See your doctor as often as he or she recommends. This is to make sure your blood pressure is under control. You will probably go at least 2 times a year. But it may be more often at first.  · Take your blood pressure medicine exactly as prescribed. You may take one or more types. They include diuretics, beta-blockers, ACE inhibitors, calcium channel blockers, and angiotensin II receptor blockers. Call your doctor if you think you are having a problem with your medicine. · If you take blood pressure medicine, talk to your doctor before you take decongestants or anti-inflammatory medicine, such as ibuprofen. These can raise blood pressure. · Learn how to check your blood pressure at home. Check it often. · Ask your doctor if it's okay to drink alcohol. · Talk to your doctor about lifestyle changes that can help blood pressure. These include being active and not smoking.   When should you call for help?  Call 911 anytime you think you may need emergency care. This may mean having symptoms that suggest that your blood pressure is causing a serious heart or blood vessel problem. Your blood pressure may be over 180/110. ? For example, call 911 if:  ? · You have symptoms of a heart attack. These may include:  ¨ Chest pain or pressure, or a strange feeling in the chest.  ¨ Sweating. ¨ Shortness of breath. ¨ Nausea or vomiting. ¨ Pain, pressure, or a strange feeling in the back, neck, jaw, or upper belly or in one or both shoulders or arms. ¨ Lightheadedness or sudden weakness. ¨ A fast or irregular heartbeat. ? · You have symptoms of a stroke. These may include:  ¨ Sudden numbness, tingling, weakness, or loss of movement in your face, arm, or leg, especially on only one side of your body. ¨ Sudden vision changes. ¨ Sudden trouble speaking. ¨ Sudden confusion or trouble understanding simple statements. ¨ Sudden problems with walking or balance. ¨ A sudden, severe headache that is different from past headaches. ? · You have severe back or belly pain. ?Do not wait until your blood pressure comes down on its own. Get help right away. ?Call your doctor now or seek immediate care if:  ? · Your blood pressure is much higher than normal (such as 180/110 or higher), but you don't have symptoms. ? · You think high blood pressure is causing symptoms, such as:  ¨ Severe headache. ¨ Blurry vision. ? Watch closely for changes in your health, and be sure to contact your doctor if:  ? · Your blood pressure measures 140/90 or higher at least 2 times. That means the top number is 140 or higher or the bottom number is 90 or higher, or both. ? · You think you may be having side effects from your blood pressure medicine. ? · Your blood pressure is usually normal, but it goes above normal at least 2 times. Where can you learn more? Go to http://collette-grecia.info/.   Enter U016 in the search box to learn more about \"Acute High Blood Pressure: Care Instructions. \"  Current as of: September 21, 2016  Content Version: 11.4  © 3016-3561 [employer]wise, [employer]. Care instructions adapted under license by [employer] (which disclaims liability or warranty for this information). If you have questions about a medical condition or this instruction, always ask your healthcare professional. [place] [address] any warranty or liability for your use of this information.

## 2018-04-19 ENCOUNTER — TELEPHONE (OUTPATIENT)
Dept: FAMILY MEDICINE CLINIC | Age: 53
End: 2018-04-19

## 2018-04-19 LAB
ATRIAL RATE: 61 BPM
CALCULATED P AXIS, ECG09: 28 DEGREES
CALCULATED R AXIS, ECG10: 7 DEGREES
CALCULATED T AXIS, ECG11: 62 DEGREES
DIAGNOSIS, 93000: NORMAL
P-R INTERVAL, ECG05: 146 MS
Q-T INTERVAL, ECG07: 436 MS
QRS DURATION, ECG06: 88 MS
QTC CALCULATION (BEZET), ECG08: 438 MS
VENTRICULAR RATE, ECG03: 61 BPM

## 2018-04-19 NOTE — TELEPHONE ENCOUNTER
Pt called in requesting a refill on her Pereceot. I read Eren Diop note to patient and she stated that the Ibuprofen doesn't work.  PT is requesting a callback on [phone]

## 2018-04-23 ENCOUNTER — TELEPHONE (OUTPATIENT)
Dept: FAMILY MEDICINE CLINIC | Age: 53
End: 2018-04-23

## 2018-04-24 NOTE — TELEPHONE ENCOUNTER
I do not treat with chronic pain meds since I won't be following up at [facility]. after Wednesday.

## 2018-04-26 DIAGNOSIS — G89.29 CHRONIC BILATERAL LOW BACK PAIN WITHOUT SCIATICA: Primary | Chronic | ICD-10-CM

## 2018-04-26 DIAGNOSIS — M54.50 CHRONIC BILATERAL LOW BACK PAIN WITHOUT SCIATICA: Primary | Chronic | ICD-10-CM

## 2018-04-26 NOTE — TELEPHONE ENCOUNTER
Contacted pt and informed her that AHS[facility] Canela does not treat chronic pain and [facility] I let pt know that AHSAN Canela will put her in a referral to pain management. She requested meds to get her thru. I informed her AHSAN Canela will not give her pain meds she will need to see pain management and to call [facility] and request an appointment they will schedule her an appt at pain management. I also informed her she would need a new PCP as [facility] does not have a provider.

## 2018-04-30 ENCOUNTER — HOSPITAL ENCOUNTER (OUTPATIENT)
Dept: LAB | Age: 53
End: 2018-04-30
Payer: COMMERCIAL

## 2018-04-30 ENCOUNTER — OFFICE VISIT (OUTPATIENT)
Dept: FAMILY MEDICINE CLINIC | Age: 53
End: 2018-04-30

## 2018-04-30 ENCOUNTER — HOSPITAL ENCOUNTER (OUTPATIENT)
Dept: GENERAL RADIOLOGY | Age: 53
Discharge: HOME | End: 2018-04-30
Payer: COMMERCIAL

## 2018-04-30 VITALS
TEMPERATURE: 98.1 F | HEIGHT: 64 IN | WEIGHT: 139.4 LBS | OXYGEN SATURATION: 98 % | RESPIRATION RATE: 12 BRPM | SYSTOLIC BLOOD PRESSURE: 190 MMHG | BODY MASS INDEX: 23.8 KG/M2 | DIASTOLIC BLOOD PRESSURE: 98 MMHG | HEART RATE: 66 BPM

## 2018-04-30 DIAGNOSIS — M62.830 BACK MUSCLE SPASM: ICD-10-CM

## 2018-04-30 DIAGNOSIS — I10 ESSENTIAL HYPERTENSION: Primary | ICD-10-CM

## 2018-04-30 DIAGNOSIS — Z12.11 SCREENING FOR COLON CANCER: ICD-10-CM

## 2018-04-30 DIAGNOSIS — M79.675 TOE PAIN, LEFT: ICD-10-CM

## 2018-04-30 DIAGNOSIS — M54.50 CHRONIC LEFT-SIDED LOW BACK PAIN WITHOUT SCIATICA: ICD-10-CM

## 2018-04-30 DIAGNOSIS — G89.29 CHRONIC LEFT-SIDED LOW BACK PAIN WITHOUT SCIATICA: ICD-10-CM

## 2018-04-30 PROCEDURE — 73660 X-RAY EXAM OF TOE(S): CPT

## 2018-04-30 RX ORDER — HYDRALAZINE HYDROCHLORIDE 50 MG/1
50 TABLET, FILM COATED ORAL 3 TIMES DAILY
Qty: 90 TAB | Refills: 0 | Status: SHIPPED | OUTPATIENT
Start: 2018-04-30 | End: 2018-06-11 | Stop reason: DRUGHIGH

## 2018-04-30 RX ORDER — OXYCODONE AND ACETAMINOPHEN 5; 325 MG/1; MG/1
1 TABLET ORAL
Qty: 6 TAB | Refills: 0 | Status: SHIPPED | OUTPATIENT
Start: 2018-04-30 | End: 2018-04-30 | Stop reason: SDUPTHER

## 2018-04-30 RX ORDER — OXYCODONE AND ACETAMINOPHEN 5; 325 MG/1; MG/1
1 TABLET ORAL
Qty: 6 TAB | Refills: 0 | Status: SHIPPED | OUTPATIENT
Start: 2018-04-30 | End: 2018-05-02

## 2018-04-30 NOTE — MR AVS SNAPSHOT
[facility] [facility] [address] [place] [address] 
[phone] Patient: Julito Woodard MRN: [id number] :1965 Visit Information Date & Time Provider Department Dept. Phone Encounter #  
 2018  8:00 AM Talha Sauer, [address] [place] [address] [id number] Follow-up Instructions Return in about 2 weeks (around 2018) for BP Check. Upcoming Health Maintenance Date Due DTaP/Tdap/Td series (1 - Tdap) 1986 FOBT Q 1 YEAR AGE 50-75 3/2/2017 BREAST CANCER SCRN MAMMOGRAM 2017 Influenza Age 5 to Adult 2018 PAP AKA CERVICAL CYTOLOGY 2020 Allergies as of 2018  Review Complete On: 2018 By: AHSAN Gallegos No Known Allergies Current Immunizations  Reviewed on 2016 No immunizations on file. Not reviewed this visit You Were Diagnosed With   
  
 Codes Comments Essential hypertension    -  Primary ICD-10-CM: I10 
ICD-9-CM: 401.9 Chronic left-sided low back pain without sciatica     ICD-10-CM: M54.5, G89.29 ICD-9-CM: 724.2, 338.29 Back muscle spasm     ICD-10-CM: Q26.307 ICD-9-CM: 724.8 Toe pain, left     ICD-10-CM: G66.306 ICD-9-CM: 729.5 Screening for colon cancer     ICD-10-CM: Z12.11 ICD-9-CM: V76.51 Vitals BP Pulse Temp Resp Height(growth percentile) Weight(growth percentile) (!) 190/98 (BP 1 Location: Left arm, BP Patient Position: Sitting) 66 98.1 °F (36.7 °C) (Oral) 12 5' 4\" (1.626 m) 139 lb 6.4 oz (63.2 kg) LMP SpO2 BMI OB Status Smoking Status 2018 98% 23.93 kg/m2 Unknown Former Smoker Vitals History BMI and BSA Data Body Mass Index Body Surface Area  
 23.93 kg/m 2 1.69 m 2 Preferred Pharmacy Pharmacy Name Phone [facility] [address] [place] [address] [phone] Your Updated Medication List  
  
   
 This list is accurate as of 4/30/18  9:32 AM.  Always use your most recent med list. amLODIPine 10 mg tablet Commonly known as:  Gaudencio Douglass Take 1 Tab by mouth daily. Indications: hypertension  
  
 citalopram 10 mg tablet Commonly known as:  Deeapli Alicia Take 1 Tab by mouth daily. cyclobenzaprine 10 mg tablet Commonly known as:  FLEXERIL Take 1 Tab by mouth every twelve (12) hours as needed for Muscle Spasm(s). hydrALAZINE 50 mg tablet Commonly known as:  APRESOLINE Take 1 Tab by mouth three (3) times daily. hydroCHLOROthiazide 25 mg tablet Commonly known as:  HYDRODIURIL Take 1 Tab by mouth daily. ibuprofen 800 mg tablet Commonly known as:  MOTRIN Take 1 Tab by mouth every eight (8) hours as needed for Pain. lisinopril 40 mg tablet Commonly known as:  Adeola Golder Take 1 Tab by mouth daily. metoprolol tartrate 100 mg IR tablet Commonly known as:  LOPRESSOR Take 1 Tab by mouth two (2) times a day. oxyCODONE-acetaminophen 5-325 mg per tablet Commonly known as:  PERCOCET Take 1 Tab by mouth every eight (8) hours as needed for Pain for up to 2 days. Max Daily Amount: 3 Tabs. topiramate 50 mg tablet Commonly known as:  TOPAMAX Take 1 Tab by mouth two (2) times a day. Prescriptions Printed Refills  
 oxyCODONE-acetaminophen (PERCOCET) 5-325 mg per tablet 0 Sig: Take 1 Tab by mouth every eight (8) hours as needed for Pain for up to 2 days. Max Daily Amount: 3 Tabs. Class: Print Route: Oral  
  
Prescriptions Sent to Pharmacy Refills  
 hydrALAZINE (APRESOLINE) 50 mg tablet 0 Sig: Take 1 Tab by mouth three (3) times daily. Class: Normal  
 Pharmacy: [address] [place] [address].  #: [phone] Route: Oral  
  
We Performed the Following REFERRAL TO COLON AND RECTAL SURGERY [REF17 Custom] REFERRAL TO ORTHOPEDICS [SQZ652 Custom] REFERRAL TO PHYSICAL THERAPY [PVU04 Custom] Follow-up Instructions Return in about 2 weeks (around 5/14/2018) for BP Check. To-Do List   
 04/30/2018 Imaging:  XR 5TH TOE LT MIN 2 V   
  
 05/05/2018 9:00 AM  
  Appointment with [facility] RM 2 at [facility] ([phone]) OUTSIDE FILMS  - Any outside films related to the study being scheduled should be brought with you on the day of the exam.  If this cannot be done there may be a delay in the reading of the study. MEDICATIONS  - Patient must bring a complete list of all medications currently taking to include prescriptions, over-the-counter meds, herbals, vitamins & any dietary supplements  GENERAL INSTRUCTIONS  - On the day of your exam do not use any bath powder, deodorant or lotions on the armpit area. -Tenderness of breasts may cause an increase of discomfort during procedure. If you are experiencing breast tenderness on the day of your appointment and would like to reschedule, please call [phone]. Referral Information Referral ID Referred By Referred To  
  
 [id number] Matias Potter MD   
   . [place] [address] [address] Phone: [phone] Fax: [phone] Visits Status Start Date End Date 1 New Request 4/30/18 4/30/19 If your referral has a status of pending review or denied, additional information will be sent to support the outcome of this decision. Referral ID Referred By Referred To  
 [id number] Matias King 1 [facility] [address] Phone: [phone] Visits Status Start Date End Date 1 New Request 4/30/18 4/30/19 If your referral has a status of pending review or denied, additional information will be sent to support the outcome of this decision.   
 Referral ID Referred By Referred To  
 [id number] Mohinder Matamoros MD  
 [address] [facility] [place], [address] Phone: [phone] Fax: [phone] Visits Status Start Date End Date 1 New Request 4/30/18 4/30/19 If your referral has a status of pending review or denied, additional information will be sent to support the outcome of this decision. Patient Instructions A Healthy Lifestyle: Care Instructions Your Care Instructions A healthy lifestyle can help you feel good, stay at a healthy weight, and have plenty of energy for both work and play. A healthy lifestyle is something you can share with your whole family. A healthy lifestyle also can lower your risk for serious health problems, such as high blood pressure, heart disease, and diabetes. You can follow a few steps listed below to improve your health and the health of your family. Follow-up care is a key part of your treatment and safety. Be sure to make and go to all appointments, and call your doctor if you are having problems. It's also a good idea to know your test results and keep a list of the medicines you take. How can you care for yourself at home? · Do not eat too much sugar, fat, or fast foods. You can still have dessert and treats now and then. The goal is moderation. · Start small to improve your eating habits. Pay attention to portion sizes, drink less juice and soda pop, and eat more fruits and vegetables. ¨ Eat a healthy amount of food. A 3-ounce serving of meat, for example, is about the size of a deck of cards. Fill the rest of your plate with vegetables and whole grains. ¨ Limit the amount of soda and sports drinks you have every day. Drink more water when you are thirsty. ¨ Eat at least 5 servings of fruits and vegetables every day. It may seem like a lot, but it is not hard to reach this goal. A serving or helping is 1 piece of fruit, 1 cup of vegetables, or 2 cups of leafy, raw vegetables. Have an apple or some carrot sticks as an afternoon snack instead of a candy bar. Try to have fruits and/or vegetables at every meal. 
· Make exercise part of your daily routine. You may want to start with simple activities, such as walking, bicycling, or slow swimming. Try to be active 30 to 60 minutes every day. You do not need to do all 30 to 60 minutes all at once. For example, you can exercise 3 times a day for 10 or 20 minutes. Moderate exercise is safe for most people, but it is always a good idea to talk to your doctor before starting an exercise program. 
· Keep moving. Cherry Banner the lawn, work in the garden, or [employer]. Take the stairs instead of the elevator at work. · If you smoke, quit. People who smoke have an increased risk for heart attack, stroke, cancer, and other lung illnesses. Quitting is hard, but there are ways to boost your chance of quitting tobacco for good. ¨ Use nicotine gum, patches, or lozenges. ¨ Ask your doctor about stop-smoking programs and medicines. ¨ Keep trying. In addition to reducing your risk of diseases in the future, you will notice some benefits soon after you stop using tobacco. If you have shortness of breath or asthma symptoms, they will likely get better within a few weeks after you quit. · Limit how much alcohol you drink. Moderate amounts of alcohol (up to 2 drinks a day for men, 1 drink a day for women) are okay. But drinking too much can lead to liver problems, high blood pressure, and other health problems. Family health If you have a family, there are many things you can do together to improve your health. · Eat meals together as a family as often as possible. · Eat healthy foods. This includes fruits, vegetables, lean meats and dairy, and whole grains. · Include your family in your fitness plan.  Most people think of activities such as jogging or tennis as the way to fitness, but there are many ways you and your family can be more active. Anything that makes you breathe hard and gets your heart pumping is exercise. Here are some tips: 
¨ Walk to do errands or to take your child to school or the bus. ¨ Go for a family bike ride after dinner instead of watching TV. Where can you learn more? Go to http://collette-grecia.info/. Enter E371 in the search box to learn more about \"A Healthy Lifestyle: Care Instructions. \" Current as of: May 12, 2017 Content Version: 11.4 © 6100-4301 [employer]. Care instructions adapted under license by [employer] (which disclaims liability or warranty for this information). If you have questions about a medical condition or this instruction, always ask your healthcare professional. [address] any warranty or liability for your use of this information. Introducing [facility]! Dear Riri Corporal: 
Thank you for requesting a [employer] account. Our records indicate that you already have an active [employer] account. You can access your account anytime at [email or web address] [employer]. Hello Music/[employer] Did you know that you can access your hospital and ER discharge instructions at any time in [employer]? You can also review all of your test results from your hospital stay or ER visit. Additional Information If you have questions, please visit the Frequently Asked Questions section of the [employer] website at [email or web address] [employer]. Hello Music/[employer]/. Remember, [employer] is NOT to be used for urgent needs. For medical emergencies, dial 911. Now available from your iPhone and Android! Please provide this summary of care documentation to your next provider. Your primary care clinician is listed as Rae Soto. If you have any questions after today's visit, please call [phone].

## 2018-04-30 NOTE — PATIENT INSTRUCTIONS
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
## 2018-04-30 NOTE — PROGRESS NOTES
Chief Complaint   Patient presents with    [facility] [place] Care    Hypertension    Anxiety    Medication Refill     Visit Vitals    BP (!) 190/98 (BP 1 Location: Left arm, BP Patient Position: Sitting)    Pulse 66    Temp 98.1 °F (36.7 °C) (Oral)    Resp 12    Ht 5' 4\" (1.626 m)    Wt 139 lb 6.4 oz (63.2 kg)    SpO2 98%    BMI 23.93 kg/m2       Patient is not fasting. Patient in room # 6.     1. Have you been to the ER, urgent care clinic since your last visit? Hospitalized since your last visit? Yes When: 04/18/2018 Where: [facility] Reason for visit: Back pain and HTN    2. Have you seen or consulted any other health care providers outside of the [address] [place] [address] since your last visit? Include any pap smears or colon screening. No    HM Reviewed. Flowsheet,  Learning needs completed.

## 2018-05-01 ENCOUNTER — TELEPHONE (OUTPATIENT)
Dept: FAMILY MEDICINE CLINIC | Age: 53
End: 2018-05-01

## 2018-05-01 NOTE — TELEPHONE ENCOUNTER
Called home number. Verified name and . Spoke with belia. Patient notified of results below per [facility]. Patient understood the reason for call and had no further questions or concerns. ----- Message from AHSAN Brito sent at 2018  8:10 AM EDT -----  X-ray shows that toe fracture is well healed with good alignment. There is some mild soft tissue swelling noted however.  I'd suggest use of ice and NSAIDs as needed for pain, and encourage patient to wear loose fitting comfortable shoes

## 2018-05-01 NOTE — PROGRESS NOTES
X-ray shows that toe fracture is well healed with good alignment. There is some mild soft tissue swelling noted however.  I'd suggest use of ice and NSAIDs as needed for pain, and encourage patient to wear loose fitting comfortable shoes

## 2018-05-09 ENCOUNTER — APPOINTMENT (OUTPATIENT)
Dept: PHYSICAL THERAPY | Age: 53
End: 2018-05-09

## 2018-05-21 ENCOUNTER — OFFICE VISIT (OUTPATIENT)
Dept: SURGERY | Age: 53
End: 2018-05-21

## 2018-05-21 VITALS
BODY MASS INDEX: 23.39 KG/M2 | RESPIRATION RATE: 16 BRPM | DIASTOLIC BLOOD PRESSURE: 120 MMHG | OXYGEN SATURATION: 98 % | HEART RATE: 58 BPM | WEIGHT: 137 LBS | TEMPERATURE: 98.5 F | HEIGHT: 64 IN | SYSTOLIC BLOOD PRESSURE: 200 MMHG

## 2018-05-21 DIAGNOSIS — Z12.11 ENCOUNTER FOR SCREENING COLONOSCOPY: Primary | ICD-10-CM

## 2018-05-21 RX ORDER — POLYETHYLENE GLYCOL 3350, SODIUM SULFATE ANHYDROUS, SODIUM BICARBONATE, SODIUM CHLORIDE, POTASSIUM CHLORIDE 236; 22.74; 6.74; 5.86; 2.97 G/4L; G/4L; G/4L; G/4L; G/4L
POWDER, FOR SOLUTION ORAL
Qty: 1 BOTTLE | Refills: 0 | Status: SHIPPED | OUTPATIENT
Start: 2018-05-21 | End: 2018-09-21

## 2018-05-21 NOTE — PROGRESS NOTES
[address]  [address] [address] [address]  [phone]    Colonoscopy History and Physical    Patient: Val Hebert MRN: [id number]  SSN: [id number]    YOB: 1965  Age: 46 y.o. Sex: female      Subjective:      Val Hebert is a 46 y.o. female who presents for colonoscopy for   Screening colonoscopy. Patient has no complaints. Patient reports family history and abdominal surgeries as noted below. Past Medical History:   Diagnosis Date    Abnormal mammogram 3/2016    BIRADS 3 right breast    Hypertension     [facility]roke [facility]) 2016    CVA     Past Surgical History:   Procedure Laterality Date    HX GYN             Family History   Problem Relation Age of Onset    No Known Problems Mother     No Known Problems Father     No Known Problems Sister     No Known Problems Brother     No Known Problems Maternal Aunt     No Known Problems Maternal Uncle     No Known Problems Paternal Aunt     No Known Problems Paternal Uncle     No Known Problems Maternal Grandmother     No Known Problems Paternal Grandmother     No Known Problems Paternal Grandfather     No Known Problems Other      Social History   Substance Use Topics    Smoking status: Former Smoker     Quit date: 1999    Smokeless tobacco: Never Used    Alcohol use No      Prior to Admission medications    Medication Sig Start Date End Date Taking? Authorizing Provider   hydrALAZINE (APRESOLINE) 50 mg tablet Take 1 Tab by mouth three (3) times daily. 18  Yes AHSAN Contreras   amLODIPine (NORVASC) 10 mg tablet Take 1 Tab by mouth daily. Indications: hypertension 18  Yes Sharlene Lindsay PA-C   lisinopril (PRINIVIL, ZESTRIL) 40 mg tablet Take 1 Tab by mouth daily. 18  Yes Sharlene Lindsay PA-C   metoprolol tartrate (LOPRESSOR) 100 mg IR tablet Take 1 Tab by mouth two (2) times a day.  18  Yes Sharlene Lindsay PA-C hydroCHLOROthiazide (HYDRODIURIL) 25 mg tablet Take 1 Tab by mouth daily. 4/18/18  Yes Gouldviviane Hodges PA-C   citalopram (CELEXA) 10 mg tablet Take 1 Tab by mouth daily. 4/18/18  Yes Chico Hodges PA-C   topiramate (TOPAMAX) 50 mg tablet Take 1 Tab by mouth two (2) times a day. 4/18/18  Yes Gouldrosalino Hodges PA-C   ibuprofen (MOTRIN) 800 mg tablet Take 1 Tab by mouth every eight (8) hours as needed for Pain. 4/18/18  Yes Balinda Dryer, MEI   cyclobenzaprine (FLEXERIL) 10 mg tablet Take 1 Tab by mouth every twelve (12) hours as needed for Muscle Spasm(s). 4/18/18  Yes Sohail Castaneda NP        No Known Allergies    Review of Systems:  Review of systems performed with findings as noted. Objective:     Vitals:    05/21/18 1341   BP: (!) 200/120   Pulse: (!) 58   Resp: 16   Temp: 98.5 °F (36.9 °C)   TempSrc: Oral   SpO2: 98%   Weight: 62.1 kg (137 lb)   Height: 5' 4\" (1.626 m)        Physical Exam:  GENERAL: alert, cooperative, no distress, appears stated age  LUNG: clear to auscultation bilaterally  HEART: regular rate and rhythm, S1, S2 normal, no murmur, click, rub or gallop  ABDOMEN: soft, non-tender. Bowel sounds normal. No masses,  no organomegaly  NEUROLOGIC: negative  PSYCHIATRIC: non focal    Assessment:   Arben Smith is a 46 y.o. female who presents for colonoscopy for   Screening colonoscopy    Plan:   1. I recommend proceeding with colonoscopy. The patient was in full agreement and was eager to proceed. 2. I discussed the details of the colonoscopy procedure. The risks of colonoscopy were discussed including colon injury/perforation, anesthesia issues, bleeding, and the possibility of incomplete examination. The patient was willing to accept these risks and proceed with the examination. All questions were answered to the patient's satisfaction. 3. The patient was provided with the instructions in preparation for the colonoscopy procedure including the bowel prep recommendations.
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
## 2018-05-21 NOTE — PATIENT INSTRUCTIONS
If you have any questions or concerns about today's appointment, the verbal and/or written instructions you were given for follow up care, please call our office at [phone].     [facility] - [address]    [phone] office  [phone]

## 2018-06-01 ENCOUNTER — HOSPITAL ENCOUNTER (EMERGENCY)
Age: 53
Discharge: HOME | End: 2018-06-01
Attending: EMERGENCY MEDICINE
Payer: SELF-PAY

## 2018-06-01 ENCOUNTER — PATIENT OUTREACH (OUTPATIENT)
Dept: FAMILY MEDICINE CLINIC | Facility: CLINIC | Age: 53
End: 2018-06-01

## 2018-06-01 ENCOUNTER — HOSPITAL ENCOUNTER (OUTPATIENT)
Dept: PHYSICAL THERAPY | Age: 53
End: 2018-06-01
Payer: SELF-PAY

## 2018-06-01 VITALS
DIASTOLIC BLOOD PRESSURE: 148 MMHG | BODY MASS INDEX: 23.69 KG/M2 | HEART RATE: 72 BPM | SYSTOLIC BLOOD PRESSURE: 229 MMHG | WEIGHT: 138 LBS | OXYGEN SATURATION: 100 % | RESPIRATION RATE: 14 BRPM | TEMPERATURE: 98 F

## 2018-06-01 DIAGNOSIS — S46.812A TRAPEZIUS STRAIN, LEFT, INITIAL ENCOUNTER: Primary | ICD-10-CM
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
## 2018-06-01 PROCEDURE — 99282 EMERGENCY DEPT VISIT SF MDM: CPT

## 2018-06-01 RX ORDER — OXYCODONE AND ACETAMINOPHEN 5; 325 MG/1; MG/1
1 TABLET ORAL
Status: DISCONTINUED | OUTPATIENT
Start: 2018-06-01 | End: 2018-06-02 | Stop reason: HOSPADM

## 2018-06-01 RX ORDER — CYCLOBENZAPRINE HCL 5 MG
10 TABLET ORAL 3 TIMES DAILY
Qty: 18 TAB | Refills: 0 | Status: SHIPPED | OUTPATIENT
Start: 2018-06-01 | End: 2018-06-11 | Stop reason: SDUPTHER

## 2018-06-01 NOTE — LETTER
[facility] EMERGENCY DEPT 
[facility] [id number] 
[phone] Work/School Note Date: 6/1/2018 To Whom It May concern: 
 
Julito Woodard was seen and treated today in the emergency room by the following provider(s): 
Attending Provider: Rashel Gama MD 
Physician Assistant: AHSAN Medina. Julito Woodard may return to work on 6/4/18. Sincerely, AHSAN Medina
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
## 2018-06-06 ENCOUNTER — TELEPHONE (OUTPATIENT)
Dept: SURGERY | Age: 53
End: 2018-06-06
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
## 2018-06-09 ENCOUNTER — HOSPITAL ENCOUNTER (OUTPATIENT)
Dept: MAMMOGRAPHY | Age: 53
Discharge: HOME | End: 2018-06-09
Attending: PHYSICIAN ASSISTANT
Payer: COMMERCIAL

## 2018-06-09 DIAGNOSIS — Z12.39 SCREENING FOR BREAST CANCER: ICD-10-CM

## 2018-06-09 PROCEDURE — 77063 BREAST TOMOSYNTHESIS BI: CPT

## 2018-06-11 ENCOUNTER — OFFICE VISIT (OUTPATIENT)
Dept: FAMILY MEDICINE CLINIC | Age: 53
End: 2018-06-11

## 2018-06-11 VITALS
HEART RATE: 60 BPM | BODY MASS INDEX: 23.8 KG/M2 | TEMPERATURE: 98.5 F | OXYGEN SATURATION: 99 % | HEIGHT: 64 IN | DIASTOLIC BLOOD PRESSURE: 98 MMHG | SYSTOLIC BLOOD PRESSURE: 202 MMHG | WEIGHT: 139.4 LBS | RESPIRATION RATE: 12 BRPM
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
## 2018-06-11 DIAGNOSIS — M54.2 ACUTE NECK PAIN: ICD-10-CM

## 2018-06-11 DIAGNOSIS — I10 UNCONTROLLED HYPERTENSION: Primary | ICD-10-CM

## 2018-06-11 RX ORDER — CYCLOBENZAPRINE HCL 5 MG
TABLET ORAL
Qty: 18 TAB | Refills: 0 | Status: SHIPPED | OUTPATIENT
Start: 2018-06-11 | End: 2018-07-26 | Stop reason: SDUPTHER

## 2018-06-11 RX ORDER — IBUPROFEN 800 MG/1
800 TABLET ORAL
Qty: 90 TAB | Refills: 0 | Status: SHIPPED | OUTPATIENT
Start: 2018-06-11 | End: 2018-07-26 | Stop reason: SDUPTHER

## 2018-06-11 RX ORDER — HYDRALAZINE HYDROCHLORIDE 100 MG/1
100 TABLET, FILM COATED ORAL 3 TIMES DAILY
Qty: 90 TAB | Refills: 0
Start: 2018-06-11 | End: 2019-06-10 | Stop reason: SDUPTHER
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
## 2018-06-11 NOTE — PATIENT INSTRUCTIONS
High Blood Pressure: Care Instructions  Your Care Instructions    If your blood pressure is usually above 140/90, you have high blood pressure, or hypertension. That means the top number is 140 or higher or the bottom number is 90 or higher, or both. Despite what a lot of people think, high blood pressure usually doesn't cause headaches or make you feel dizzy or lightheaded. It usually has no symptoms. But it does increase your risk for heart attack, stroke, and kidney or eye damage. The higher your blood pressure, the more your risk increases. Your doctor will give you a goal for your blood pressure. Your goal will be based on your health and your age. An example of a goal is to keep your blood pressure below 140/90. Lifestyle changes, such as eating healthy and being active, are always important to help lower blood pressure. You might also take medicine to reach your blood pressure goal.  Follow-up care is a key part of your treatment and safety. Be sure to make and go to all appointments, and call your doctor if you are having problems. It's also a good idea to know your test results and keep a list of the medicines you take. How can you care for yourself at home? Medical treatment  · If you stop taking your medicine, your blood pressure will go back up. You may take one or more types of medicine to lower your blood pressure. Be safe with medicines. Take your medicine exactly as prescribed. Call your doctor if you think you are having a problem with your medicine. · Talk to your doctor before you start taking aspirin every day. Aspirin can help certain people lower their risk of a heart attack or stroke. But taking aspirin isn't right for everyone, because it can cause serious bleeding. · See your doctor regularly. You may need to see the doctor more often at first or until your blood pressure comes down.   · If you are taking blood pressure medicine, talk to your doctor before you take decongestants or anti-inflammatory medicine, such as ibuprofen. Some of these medicines can raise blood pressure. · Learn how to check your blood pressure at home. Lifestyle changes  · Stay at a healthy weight. This is especially important if you put on weight around the waist. Losing even 10 pounds can help you lower your blood pressure. · If your doctor recommends it, get more exercise. Walking is a good choice. Bit by bit, increase the amount you walk every day. Try for at least 30 minutes on most days of the week. You also may want to swim, bike, or do other activities. · Avoid or limit alcohol. Talk to your doctor about whether you can drink any alcohol. · Try to limit how much sodium you eat to less than 2,300 milligrams (mg) a day. Your doctor may ask you to try to eat less than 1,500 mg a day. · Eat plenty of fruits (such as bananas and oranges), vegetables, legumes, whole grains, and low-fat dairy products. · Lower the amount of saturated fat in your diet. Saturated fat is found in animal products such as milk, cheese, and meat. Limiting these foods may help you lose weight and also lower your risk for heart disease. · Do not smoke. Smoking increases your risk for heart attack and stroke. If you need help quitting, talk to your doctor about stop-smoking programs and medicines. These can increase your chances of quitting for good. When should you call for help? Call 911 anytime you think you may need emergency care. This may mean having symptoms that suggest that your blood pressure is causing a serious heart or blood vessel problem. Your blood pressure may be over 180/110. ? For example, call 911 if:  ? · You have symptoms of a heart attack. These may include:  ¨ Chest pain or pressure, or a strange feeling in the chest.  ¨ Sweating. ¨ Shortness of breath. ¨ Nausea or vomiting.   ¨ Pain, pressure, or a strange feeling in the back, neck, jaw, or upper belly or in one or both shoulders or arms.  ¨ Lightheadedness or sudden weakness. ¨ A fast or irregular heartbeat. ? · You have symptoms of a stroke. These may include:  ¨ Sudden numbness, tingling, weakness, or loss of movement in your face, arm, or leg, especially on only one side of your body. ¨ Sudden vision changes. ¨ Sudden trouble speaking. ¨ Sudden confusion or trouble understanding simple statements. ¨ Sudden problems with walking or balance. ¨ A sudden, severe headache that is different from past headaches. ? · You have severe back or belly pain. ?Do not wait until your blood pressure comes down on its own. Get help right away. ?Call your doctor now or seek immediate care if:  ? · Your blood pressure is much higher than normal (such as 180/110 or higher), but you don't have symptoms. ? · You think high blood pressure is causing symptoms, such as:  ¨ Severe headache. ¨ Blurry vision. ? Watch closely for changes in your health, and be sure to contact your doctor if:  ? · Your blood pressure measures 140/90 or higher at least 2 times. That means the top number is 140 or higher or the bottom number is 90 or higher, or both. ? · You think you may be having side effects from your blood pressure medicine. ? · Your blood pressure is usually normal, but it goes above normal at least 2 times. Where can you learn more? Go to http://collette-grecia.info/. Enter V239 in the search box to learn more about \"High Blood Pressure: Care Instructions. \"  Current as of: September 21, 2016  Content Version: 11.4  © 0693-2392 [employer]. Care instructions adapted under license by [employer] (which disclaims liability or warranty for this information). If you have questions about a medical condition or this instruction, always ask your healthcare professional. [place] [address] any warranty or liability for your use of this information.
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
## 2018-06-12 ENCOUNTER — PATIENT OUTREACH (OUTPATIENT)
Dept: FAMILY MEDICINE CLINIC | Age: 53
End: 2018-06-12

## 2018-06-12 DIAGNOSIS — I10 UNCONTROLLED HYPERTENSION: ICD-10-CM
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
## 2018-06-26 ENCOUNTER — TELEPHONE (OUTPATIENT)
Dept: FAMILY MEDICINE CLINIC | Age: 53
End: 2018-06-26

## 2018-06-26 NOTE — TELEPHONE ENCOUNTER
Called home number. Verified name and . Spoke with patient. Patient provided number to PT in [place]. Patient had no further questions or concerns.
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
## 2018-06-27 ENCOUNTER — HOSPITAL ENCOUNTER (OUTPATIENT)
Dept: PHYSICAL THERAPY | Age: 53
Discharge: HOME | End: 2018-06-27
Payer: SELF-PAY

## 2018-06-27 ENCOUNTER — OFFICE VISIT (OUTPATIENT)
Dept: FAMILY MEDICINE CLINIC | Age: 53
End: 2018-06-27

## 2018-06-27 VITALS
HEIGHT: 64 IN | WEIGHT: 134.8 LBS | RESPIRATION RATE: 12 BRPM | TEMPERATURE: 98.3 F | OXYGEN SATURATION: 98 % | SYSTOLIC BLOOD PRESSURE: 160 MMHG | BODY MASS INDEX: 23.01 KG/M2 | DIASTOLIC BLOOD PRESSURE: 100 MMHG | HEART RATE: 60 BPM

## 2018-06-27 DIAGNOSIS — I10 UNCONTROLLED HYPERTENSION: Primary | ICD-10-CM

## 2018-06-27 PROCEDURE — 97014 ELECTRIC STIMULATION THERAPY: CPT

## 2018-06-27 PROCEDURE — 97162 PT EVAL MOD COMPLEX 30 MIN: CPT

## 2018-06-27 PROCEDURE — 97110 THERAPEUTIC EXERCISES: CPT
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
## 2018-06-27 NOTE — MR AVS SNAPSHOT
[address] [facility] [address] 200 [facility] 
[phone] Patient: Libia Charles MRN: [id number] :1965 Visit Information Date & Time Provider Department Dept. Phone Encounter #  
 2018  3:00 PM Paco Funes, [address] [place] [address] Road [id number] Follow-up Instructions Return in about 2 weeks (around 2018) for b/p check and result review. Your Appointments 2018 10:05 AM  
New Patient with Nena Brandt MD  
[address] [facility], [address] and [facility] ([facility]) Appt Note: Chronic left-sided low back pain without sciatica /Back muscle spasm/ XRAYS-N/MRI-N/*ADVISED PT TO COME EARLY W. PHOTO ID & INS. CARD, CURRENT MEDICATION LIST & DOSAGE TO THE [facility]ST ONE Autumn Damien show appointment from 18 rescheduled. ; Chronic left-sided low back pain without sciatica /Back muscle spasm/ XRAYS-N/MRI-N/*ADVISED PT TO COME EARLY W. PHOTO ID & INS. CARD, CURRENT MEDICATION LIST & DOSAGE TO THE MAST ONE Autumn Damien show appointment from 18 rescheduled./ r/s from *TRUNCATED*  
 [address] [facility] [address]  
[phone]  
  
   
 [address] [facility] [address] Upcoming Health Maintenance Date Due DTaP/Tdap/Td series (1 - Tdap) 1986 FOBT Q 1 YEAR AGE 50-75 3/2/2017 Influenza Age 5 to Adult 2018 BREAST CANCER SCRN MAMMOGRAM 2018 PAP AKA CERVICAL CYTOLOGY 2020 Allergies as of 2018  Review Complete On: 2018 By: AHSAN Brito No Known Allergies Current Immunizations  Reviewed on 2016 No immunizations on file. Not reviewed this visit You Were Diagnosed With   
  
 Codes Comments Uncontrolled hypertension    -  Primary ICD-10-CM: I10 
ICD-9-CM: 401.9 Vitals BP Pulse Temp Resp Height(growth percentile) Weight(growth percentile) (!) 160/100 (BP 1 Location: Right arm, BP Patient Position: Sitting) 60 98.3 °F (36.8 °C) (Oral) 12 5' 4\" (1.626 m) 134 lb 12.8 oz (61.1 kg) LMP SpO2 BMI OB Status Smoking Status 06/21/2018 98% 23.14 kg/m2 Having regular periods Former Smoker Vitals History BMI and BSA Data Body Mass Index Body Surface Area  
 23.14 kg/m 2 1.66 m 2 Preferred Pharmacy Pharmacy Name Phone [facility] [address] [place] [address] [phone] Your Updated Medication List  
  
   
This list is accurate as of 6/27/18  3:38 PM.  Always use your most recent med list. amLODIPine 10 mg tablet Commonly known as:  [place] Bars Take 1 Tab by mouth daily. Indications: hypertension  
  
 citalopram 10 mg tablet Commonly known as:  Corena Lohman Take 1 Tab by mouth daily. cyclobenzaprine 5 mg tablet Commonly known as:  FLEXERIL Take 1-2 tabs up to 3 times daily as needed for pain and spasm. hydrALAZINE 100 mg tablet Commonly known as:  APRESOLINE Take 1 Tab by mouth three (3) times daily. hydroCHLOROthiazide 25 mg tablet Commonly known as:  HYDRODIURIL Take 1 Tab by mouth daily. ibuprofen 800 mg tablet Commonly known as:  MOTRIN Take 1 Tab by mouth every eight (8) hours as needed for Pain. lisinopril 40 mg tablet Commonly known as:  Dorathy Massed Take 1 Tab by mouth daily. metoprolol tartrate 100 mg IR tablet Commonly known as:  LOPRESSOR Take 1 Tab by mouth two (2) times a day. PEG 3350-Electrolytes 236-22.74-6.74 -5.86 gram suspension Commonly known as:  GO-LYTELY Take as directed  
  
 topiramate 50 mg tablet Commonly known as:  TOPAMAX Take 1 Tab by mouth two (2) times a day. Follow-up Instructions Return in about 2 weeks (around 7/11/2018) for b/p check and result review.   
  
To-Do List   
 06/28/2018 3:30 PM  
  Appointment with Sadie Duff PT at [address] [place] [address] ([phone])  
  
 07/03/2018 4:00 PM  
  Appointment with Sadie Duff PT at [address] [place] [address] ([phone]) 07/06/2018 8:30 AM  
  Appointment with Luis Fernando Kovacs PTA at [facility] PT [facility] IM ([phone]) 07/09/2018 7:30 AM  
  Appointment with [facility] [facility] CV SERV at [facility] NON-INVASIVE CARD ([phone]) Patient needs to bring a current list of all medications  No preparation is required for this study  This study requires patient to bring a written physicians order or the MD office may fax the order to Central Scheduling at [phone]. This exam is performed in the [address] [place] [address] at [facility] in the echo department. Please have the patient arrive 15 minutes prior to their schedule appointment time and report to Patient Registration at the main entrance of the hospital.     AGE LIMIT for this procedure at [facility] is 25years old. All patients under 25years of age should be referred to [address] [place] [address].  
  
 07/09/2018 9:00 AM  
  Appointment with [facility] VAS LAB RM 2 at [facility] VASCULAR [address] [place] [address] ([phone]) 1. Eat a light dinner the night before. 2.  You should have nothing to eat or drink after midnight the night before the exam until after this exam is completed. 3.  Day of Examination:  DO NOT EAT OR DRINK ANYTHING!! This include medications. Please bring your medications with you and you can take them after the examination. If you are diabetic, then bring a breakfast meal.  You will be able to eat it following the examination. Please fax prescription/order to Central Scheduling - [phone]. Patients do NOT need to report early for registration when scheduled for this study at [facility]. The report time is the time the study has been scheduled.   
  
 07/10/2018 4:00 PM  
  Appointment with Sadie Duff PT at [address] [place] [address] ([phone])  
  
 07/12/2018 4:00 PM  
 Appointment with Carmen Willams PT at [facility] IM ([phone])  
  
 07/17/2018 4:30 PM  
  Appointment with [facility] PT [facility] 2 at [facility] IM ([phone])  
  
 07/19/2018 4:30 PM  
  Appointment with Carmen Willams PT at [facility] ([phone]) Patient Instructions A Healthy Lifestyle: Care Instructions Your Care Instructions A healthy lifestyle can help you feel good, stay at a healthy weight, and have plenty of energy for both work and play. A healthy lifestyle is something you can share with your whole family. A healthy lifestyle also can lower your risk for serious health problems, such as high blood pressure, heart disease, and diabetes. You can follow a few steps listed below to improve your health and the health of your family. Follow-up care is a key part of your treatment and safety. Be sure to make and go to all appointments, and call your doctor if you are having problems. It's also a good idea to know your test results and keep a list of the medicines you take. How can you care for yourself at home? · Do not eat too much sugar, fat, or fast foods. You can still have dessert and treats now and then. The goal is moderation. · Start small to improve your eating habits. Pay attention to portion sizes, drink less juice and soda pop, and eat more fruits and vegetables. ¨ Eat a healthy amount of food. A 3-ounce serving of meat, for example, is about the size of a deck of cards. Fill the rest of your plate with vegetables and whole grains. ¨ Limit the amount of soda and sports drinks you have every day. Drink more water when you are thirsty. ¨ Eat at least 5 servings of fruits and vegetables every day. It may seem like a lot, but it is not hard to reach this goal. A serving or helping is 1 piece of fruit, 1 cup of vegetables, or 2 cups of leafy, raw vegetables.  Have an apple or some carrot sticks as an afternoon snack instead of a candy bar. Try to have fruits and/or vegetables at every meal. 
· Make exercise part of your daily routine. You may want to start with simple activities, such as walking, bicycling, or slow swimming. Try to be active 30 to 60 minutes every day. You do not need to do all 30 to 60 minutes all at once. For example, you can exercise 3 times a day for 10 or 20 minutes. Moderate exercise is safe for most people, but it is always a good idea to talk to your doctor before starting an exercise program. 
· Keep moving. Dereje Eli the lawn, work in the garden, or [employer]. Take the stairs instead of the elevator at work. · If you smoke, quit. People who smoke have an increased risk for heart attack, stroke, cancer, and other lung illnesses. Quitting is hard, but there are ways to boost your chance of quitting tobacco for good. ¨ Use nicotine gum, patches, or lozenges. ¨ Ask your doctor about stop-smoking programs and medicines. ¨ Keep trying. In addition to reducing your risk of diseases in the future, you will notice some benefits soon after you stop using tobacco. If you have shortness of breath or asthma symptoms, they will likely get better within a few weeks after you quit. · Limit how much alcohol you drink. Moderate amounts of alcohol (up to 2 drinks a day for men, 1 drink a day for women) are okay. But drinking too much can lead to liver problems, high blood pressure, and other health problems. Family health If you have a family, there are many things you can do together to improve your health. · Eat meals together as a family as often as possible. · Eat healthy foods. This includes fruits, vegetables, lean meats and dairy, and whole grains. · Include your family in your fitness plan. Most people think of activities such as jogging or tennis as the way to fitness, but there are many ways you and your family can be more active.  Anything that makes you breathe hard and gets your heart pumping is exercise. Here are some tips: 
¨ Walk to do errands or to take your child to school or the bus. ¨ Go for a family bike ride after dinner instead of watching TV. Where can you learn more? Go to http://collette-grecia.info/. Enter I816 in the search box to learn more about \"A Healthy Lifestyle: Care Instructions. \" Current as of: May 12, 2017 Content Version: 11.4 © 3525-5226 [employer]. Care instructions adapted under license by [employer] (which disclaims liability or warranty for this information). If you have questions about a medical condition or this instruction, always ask your healthcare professional. [address] any warranty or liability for your use of this information. Introducing [facility]! Dear Abiodun Castellano: 
Thank you for requesting a [employer] account. Our records indicate that you already have an active [employer] account. You can access your account anytime at [email or web address] Did you know that you can access your hospital and ER discharge instructions at any time in [employer]? You can also review all of your test results from your hospital stay or ER visit. Additional Information If you have questions, please visit the Frequently Asked Questions section of the [employer] website at [email or web address] Remember, [employer] is NOT to be used for urgent needs. For medical emergencies, dial 911. Now available from your iPhone and Android! Please provide this summary of care documentation to your next provider. Your primary care clinician is listed as Bulmaro Oneil. If you have any questions after today's visit, please call [phone].

## 2018-06-27 NOTE — PROGRESS NOTES
Chief Complaint   Patient presents with    Blood Pressure Check     Visit Vitals    BP (!) 160/100 (BP 1 Location: Right arm, BP Patient Position: Sitting)    Pulse 60    Temp 98.3 °F (36.8 °C) (Oral)    Resp 12    Ht 5' 4\" (1.626 m)    Wt 134 lb 12.8 oz (61.1 kg)    SpO2 98%    BMI 23.14 kg/m2     Patient is not fasting. Patient in room # 5.     1. Have you been to the ER, urgent care clinic since your last visit? Hospitalized since your last visit? No    2. Have you seen or consulted any other health care providers outside of the [address] [place] [address] since your last visit? Include any pap smears or colon screening. No    HM Reviewed.
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
## 2018-06-27 NOTE — PATIENT INSTRUCTIONS

## 2018-06-27 NOTE — PROGRESS NOTES
Patient: Benedicto Grove MRN: [id number]  SSN: [id number]    YOB: 1965  Age: 46 y.o. Sex: female      Date of Service: 6/27/2018   Provider: AHSAN Payne   Office Location:   [address], [address] [place] [address] [address] Tucker rosenthal, [address]  Office Phone: [phone]  Office Fax: [phone]      REASON FOR VISIT:   Chief Complaint   Patient presents with    Blood Pressure Check        VITALS:   Visit Vitals    BP (!) 160/100 (BP 1 Location: Right arm, BP Patient Position: Sitting)  Comment: manual    Pulse 60    Temp 98.3 °F (36.8 °C) (Oral)    Resp 12    Ht 5' 4\" (1.626 m)    Wt 134 lb 12.8 oz (61.1 kg)    LMP 06/21/2018    SpO2 98%    BMI 23.14 kg/m2        MEDICATIONS:   Current Outpatient Prescriptions on File Prior to Visit   Medication Sig Dispense Refill    ibuprofen (MOTRIN) 800 mg tablet Take 1 Tab by mouth every eight (8) hours as needed for Pain. 90 Tab 0    cyclobenzaprine (FLEXERIL) 5 mg tablet Take 1-2 tabs up to 3 times daily as needed for pain and spasm. 18 Tab 0    hydrALAZINE (APRESOLINE) 100 mg tablet Take 1 Tab by mouth three (3) times daily. 90 Tab 0    amLODIPine (NORVASC) 10 mg tablet Take 1 Tab by mouth daily. Indications: hypertension 90 Tab 1    lisinopril (PRINIVIL, ZESTRIL) 40 mg tablet Take 1 Tab by mouth daily. 90 Tab 1    metoprolol tartrate (LOPRESSOR) 100 mg IR tablet Take 1 Tab by mouth two (2) times a day. 180 Tab 1    hydroCHLOROthiazide (HYDRODIURIL) 25 mg tablet Take 1 Tab by mouth daily. 30 Tab 3    citalopram (CELEXA) 10 mg tablet Take 1 Tab by mouth daily. 90 Tab 1    topiramate (TOPAMAX) 50 mg tablet Take 1 Tab by mouth two (2) times a day. 60 Tab 3    PEG 3350-Electrolytes (GO-LYTELY) 236-22.74-6.74 -5.86 gram suspension Take as directed 1 Bottle 0     No current facility-administered medications on file prior to visit.          ALLERGIES:   No Known Allergies     MEDICAL/SURGICAL HISTORY:  Past Medical History:   Diagnosis Date    Abnormal mammogram 3/2016    BIRADS 3 right breast    Hypertension     [facility]roke [facility]) 2016    CVA      Past Surgical History:   Procedure Laterality Date    HX GYN  1982             FAMILY HISTORY:  Family History   Problem Relation Age of Onset    No Known Problems Mother     No Known Problems Father     No Known Problems Sister     No Known Problems Brother     No Known Problems Maternal Aunt     No Known Problems Maternal Uncle     No Known Problems Paternal Aunt     No Known Problems Paternal Uncle     No Known Problems Maternal Grandmother     No Known Problems Paternal Grandmother     No Known Problems Paternal Grandfather     No Known Problems Other         SOCIAL HISTORY:  Social History   Substance Use Topics    Smoking status: Former Smoker     Quit date: 1999    Smokeless tobacco: Never Used    Alcohol use No             HISTORY OF PRESENT ILLNESS: Barb Yates is a 46 y.o. female who presents to the office for a routine follow up visit. Hypertension -   Here today for a blood pressure check. BP has improved dramatically since last visit two weeks ago, but is still not at goal.  There have been some compliance issues related to a lapse in health insurance, but today, she tells me she has been taking all of her meds. She is prescribed hydralazine 100 mg TID, amlodipine 10 mg daily, lisinopril 40 mg daily, metoprolol  gm daily, and HCTZ 25 mg daily. Home BP readings: consistent with today's reading, generally 160s/100s. Patient denies headaches, visual disturbances, shortness of breath, chest pain, heart palpitations, lightheadedness, syncope        REVIEW OF SYSTEMS:  ROS as noted in HPI    PHYSICAL EXAMINATION:  Physical Exam   Constitutional: She is oriented to person, place, and time and well-developed, well-nourished, and in no distress. Cardiovascular: Normal rate, regular rhythm and normal heart sounds.   Exam reveals no gallop and no friction rub. No murmur heard. Pulmonary/Chest: Effort normal and breath sounds normal. She has no wheezes. She has no rales. Neurological: She is alert and oriented to person, place, and time. Gait normal.   Skin: Skin is warm and dry. No rash noted. Psychiatric: Mood, memory and affect normal.        RESULTS:  No results found for this visit on 06/27/18. ASSESSMENT/PLAN:  Diagnoses and all orders for this visit:    1. Uncontrolled hypertension  - Looking back over the 5 years of records available in our system, patient has had severely elevated blood pressure since at least 2013. While some of that may be attributed to noncompliance, I am becoming increasingly suspicious of a secondary cause of HTN   - She has renal artery ultrasound and echocardiogram scheduled for 7/9  - She was advised to have fasting labs done ASAP  - I will see her back in 2 weeks to review test results, and consider specialist referral (cardiology vs. nephrology) depending on imaging and labs  - In the meantime, she will continue her current regimen. I am hesitant to make further adjustments without seeing labs. Could consider adding clonidine or spironolactone? Follow-up Disposition:  Return in about 2 weeks (around 7/11/2018) for b/p check and result review. All questions answered. Patient expresses understanding and agrees with the plan as detailed above.     PATIENT CARE TEAM:   Patient Care Team:  AHSAN Melgar as PCP - General (Physician Assistant)  Mickey Lucio MD as Consulting Provider (Neurology)  Adriana Fountain NP (Nurse Practitioner)  Nicole Sanabria MD (Colon and Rectal Surgery)       AHSAN Melgar   June 27, 2018    3:41 PM

## 2018-06-28 ENCOUNTER — HOSPITAL ENCOUNTER (OUTPATIENT)
Dept: PHYSICAL THERAPY | Age: 53
Discharge: HOME | End: 2018-06-28
Payer: SELF-PAY

## 2018-06-28 PROCEDURE — 97014 ELECTRIC STIMULATION THERAPY: CPT

## 2018-06-28 PROCEDURE — 97110 THERAPEUTIC EXERCISES: CPT

## 2018-06-28 PROCEDURE — 97140 MANUAL THERAPY 1/> REGIONS: CPT

## 2018-06-28 NOTE — PROGRESS NOTES
PT DAILY TREATMENT NOTE     Patient Name: Ila Escobedo  Date:2018  : 1965  [x]  Patient  Verified  Payor: SELF PAY / Plan: [facility] SELF PAY / Product Type: Self Pay /    In time:338  Out time:423  Total Treatment Time (min): 45  Visit #: 2 of     Treatment Area: Chronic back pain [M54.9, G89.29]    SUBJECTIVE  Pain Level (0-10 scale): 6  Any medication changes, allergies to medications, adverse drug reactions, diagnosis change, or new procedure performed?: [x] No    [] Yes (see summary sheet for update)  Subjective functional status/changes:   [] No changes reported  \"Im doing. \"    OBJECTIVE  Modality rationale: decrease pain to improve the patients ability to increase activity tolerance    Min Type Additional Details   10 [x] Estim: []Att   [x]Unatt        []TENS instruct                  []IFC  [x]Premod   []NMES                     []Other:  []w/US   []w/ice   [x]w/heat  Position: semi reclined  Location: CS and LS     []  Traction: [] Cervical       []Lumbar                       [] Prone          []Supine                       []Intermittent   []Continuous Lbs:  [] before manual  [] after manual    []  Ultrasound: []Continuous   [] Pulsed                           []1MHz   []3MHz Location:  W/cm2:    []  Iontophoresis with dexamethasone         Location: [] Take home patch   [] In clinic    []  Ice     []  heat  []  Ice massage Position:  Location:    []  Vasopneumatic Device Pressure:       [] lo [] med [] hi   Temperature: [] lo [] med [] hi   [x] Skin assessment post-treatment:  [x]intact []redness- no adverse reaction       []redness - adverse reaction:       27 min Therapeutic Exercise:  [x] See flow sheet :Initiated ther ex per flow sheet    Rationale: increase ROM, increase strength, improve coordination, improve balance, increase proprioception and improve core strength  to improve the patients ability to progress to increased activity tolerance and QOL    8 min Manual Therapy:  SI check : R ant rotation - corrected 100% with MET and shot gun    Rationale: decrease pain, increase ROM, increase tissue extensibility and improve pelvic alignment to decrease asymmetries causing pain and limited function     x min Patient Education: [x] Review HEP from IE        Other Objective/Functional Measures: Therex initiated today - first FU post eval   Hip flexion PROM with SKTC - limited to 95 deg B    Cane shoulder flexion: 130 deg limited by pain     Pain Level (0-10 scale) post treatment: 5    ASSESSMENT/Changes in Function: Patient tolerated initiation of therex fair. 100% VC for form and technique for all newly introduced therex. Pelvic obliquity improved from yesterday at IE as patient reports doing self MET this morning. Patient challenged by low level program for ROM and flexibility. Patient will continue to benefit from skilled PT services to modify and progress therapeutic interventions, address functional mobility deficits, address ROM deficits, address strength deficits, analyze and address soft tissue restrictions, analyze and cue movement patterns, analyze and modify body mechanics/ergonomics and assess and modify postural abnormalities to attain remaining goals. [x]  See Plan of Care  []  See progress note/recertification  []  See Discharge Summary         Progress towards goals / Updated goals:  FIRST FU TREATMENT - CONT PER POT TO EST GOALS 6/28/2018  · Short Term Goals: To be accomplished in  1  weeks:  1. Pt will be independent and compliant with HEP  · Long Term Goals: To be accomplished in  8-12  treatments:  1. Patient will increase FOTO score to 47/100 for indications of increased functional mobility. 2.  Patient will demo LS flexion to mid shin for ease with LE dressing   3. Patient will demo 50% bridge for progression of core strength for prolonged activity   4.  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

## 2018-07-03 ENCOUNTER — HOSPITAL ENCOUNTER (OUTPATIENT)
Dept: PHYSICAL THERAPY | Age: 53
Discharge: HOME | End: 2018-07-03
Payer: SELF-PAY

## 2018-07-03 PROCEDURE — 97110 THERAPEUTIC EXERCISES: CPT

## 2018-07-03 PROCEDURE — 97014 ELECTRIC STIMULATION THERAPY: CPT

## 2018-07-03 NOTE — PROGRESS NOTES
PT DAILY TREATMENT NOTE     Patient Name: Nam Cutting  Date:7/3/2018  : 1965  [x]  Patient  Verified  Payor: SELF PAY / Plan: [facility] SELF PAY / Product Type: Self Pay /    In time:350 Out time:445  Total Treatment Time (min): 54  Visit #: 3 of     Treatment Area: Chronic back pain [M54.9, G89.29]    SUBJECTIVE  Pain Level (0-10 scale): 6.5  Any medication changes, allergies to medications, adverse drug reactions, diagnosis change, or new procedure performed?: [x] No    [] Yes (see summary sheet for update)  Subjective functional status/changes:   [] No changes reported  \"Alittle more sore. \"    OBJECTIVE  Modality rationale: decrease pain to improve the patients ability to increase activity tolerance    Min Type Additional Details   10 [x] Estim: []Att   [x]Unatt        []TENS instruct                  []IFC  [x]Premod   []NMES                     []Other:  []w/US   []w/ice   [x]w/heat  Position: semi reclined  Location: CS and LS     []  Traction: [] Cervical       []Lumbar                       [] Prone          []Supine                       []Intermittent   []Continuous Lbs:  [] before manual  [] after manual    []  Ultrasound: []Continuous   [] Pulsed                           []1MHz   []3MHz Location:  W/cm2:    []  Iontophoresis with dexamethasone         Location: [] Take home patch   [] In clinic    []  Ice     []  heat  []  Ice massage Position:  Location:    []  Vasopneumatic Device Pressure:       [] lo [] med [] hi   Temperature: [] lo [] med [] hi   [x] Skin assessment post-treatment:  [x]intact []redness- no adverse reaction       []redness  adverse reaction:       43 min Therapeutic Exercise:  [x] See flow sheet :   Rationale: increase ROM, increase strength, improve coordination, improve balance, increase proprioception and improve core strength  to improve the patients ability to progress to increased activity tolerance and QOL    2 min Manual Therapy:  SI check - all symm Rationale: decrease pain, increase ROM, increase tissue extensibility and improve pelvic alignment to decrease asymmetries causing pain and limited function     x min Patient Education: [x] Review HEP- added scap squeezes, SKTC and LTR      Other Objective/Functional Measures:     HEP compliance : 2x per day compliance    Treatment progression - initiated NuStep, pulleys and scap squeezes   AAROM pulleys - flexion approx 150 deg with end range pain     Pain Level (0-10 scale) post treatment: 5    ASSESSMENT/Changes in Function: Patient reports first FU treatment caused m soreness. Patient educated that m soreness is normal after new activity. Fair tolerance to new therex including only tolerating 3 min on nustep and not fast enough to turn on machine. CO tightness with pulleys. Patient demo all landmark symm with SI check today - no MET needed. Patient will continue to benefit from skilled PT services to modify and progress therapeutic interventions, address functional mobility deficits, address ROM deficits, address strength deficits, analyze and address soft tissue restrictions, analyze and cue movement patterns, analyze and modify body mechanics/ergonomics and assess and modify postural abnormalities to attain remaining goals. [x]  See Plan of Care  []  See progress note/recertification  []  See Discharge Summary         Progress towards goals / Updated goals:  Short Term Goals: To be accomplished in  1  weeks:  1. Pt will be independent and compliant with HEP Goal progressing - HEP est with no questions. HEP will be modified and progressed as appropriate 7/3/18  · Long Term Goals: To be accomplished in  8-12  treatments:  1. Patient will increase FOTO score to 47/100 for indications of increased functional mobility. 2.  Patient will demo LS flexion to mid shin for ease with LE dressing   3. Patient will demo 50% bridge for progression of core strength for prolonged activity   4.  Patient will donna CS rotation [facility] for ease and safety with driving     PLAN  [x]  Upgrade activities as tolerated     []  Continue plan of care  [x]  Update interventions per flow sheet       []  Discharge due to:_  [x]  Other:_cont to check SI for symm     Amrita Juarez, PT 7/3/2018

## 2018-07-06 ENCOUNTER — HOSPITAL ENCOUNTER (OUTPATIENT)
Dept: PHYSICAL THERAPY | Age: 53
Discharge: HOME | End: 2018-07-06
Payer: SELF-PAY

## 2018-07-06 PROCEDURE — 97014 ELECTRIC STIMULATION THERAPY: CPT

## 2018-07-06 PROCEDURE — 97110 THERAPEUTIC EXERCISES: CPT

## 2018-07-06 NOTE — PROGRESS NOTES
PT DAILY TREATMENT NOTE     Patient Name: Sarah Bustamante  Date:2018  : 1965  [x]  Patient  Verified  Payor: SELF PAY / Plan: [facility] SELF PAY / Product Type: Self Pay /    In time: 8:30 am                Out time: 9:33 am  Total Treatment Time (min): 63  Visit #: 4 of     Treatment Area: Chronic back pain [M54.9, G89.29]    SUBJECTIVE  Pain Level (0-10 scale): 6  Any medication changes, allergies to medications, adverse drug reactions, diagnosis change, or new procedure performed?: [x] No    [] Yes (see summary sheet for update)  Subjective functional status/changes:   [] No changes reported  \"I am tight. \"     OBJECTIVE  Modality rationale: decrease pain to improve the patients ability to increase activity tolerance    Min Type Additional Details   10 [x] Estim: []Att   [x]Unatt        []TENS instruct                  []IFC  [x]Premod   []NMES                     []Other:  []w/US   []w/ice   [x]w/heat  Position: semi-reclined with LEs elevated on wedge  Location: CS and LS     []  Traction: [] Cervical       []Lumbar                       [] Prone          []Supine                       []Intermittent   []Continuous Lbs:  [] before manual  [] after manual    []  Ultrasound: []Continuous   [] Pulsed                           []1MHz   []3MHz Location:  W/cm2:    []  Iontophoresis with dexamethasone         Location: [] Take home patch   [] In clinic    []  Ice     []  heat  []  Ice massage Position:  Location:    []  Vasopneumatic Device Pressure:       [] lo [] med [] hi   Temperature: [] lo [] med [] hi   [x] Skin assessment post-treatment:  [x]intact []redness- no adverse reaction       []redness  adverse reaction:     51 min Therapeutic Exercise:  [x] See flow sheet: added TA jana hernandez, and SB flexion stretch (forward only)   Rationale: increase ROM, increase strength, improve coordination, improve balance, increase proprioception and improve core strength  to improve the patients ability to progress to increased activity tolerance and QOL    2 min Manual Therapy:  SI check - symmetrical   Rationale: decrease pain, increase ROM, increase tissue extensibility and improve pelvic alignment to decrease asymmetries causing pain and limited function     X min Patient Education: [x] Review HEP     Other Objective/Functional Measures:     L/S flexion AROM: fingertips to mid-shin with end-range tightness and pain   Core strength: 50% bridge    Pain Level (0-10 scale) post treatment: 0    ASSESSMENT/Changes in Function:   Patient progressing towards/met LTGs #2 and 3. Patient will continue to benefit from skilled PT services to modify and progress therapeutic interventions, address functional mobility deficits, address ROM deficits, address strength deficits, analyze and address soft tissue restrictions, analyze and cue movement patterns, analyze and modify body mechanics/ergonomics and assess and modify postural abnormalities to attain remaining goals. [x]  See Plan of Care  []  See progress note/recertification  []  See Discharge Summary         Progress towards goals / Updated goals:  Short Term Goals: To be accomplished in  1  weeks:  1. Pt will be independent and compliant with HEP Goal progressing - HEP est with no questions. HEP will be modified and progressed as appropriate 7/3/18  Long Term Goals: To be accomplished in  8-12  treatments:  1. Patient will increase FOTO score to 47/100 for indications of increased functional mobility. 2.  Patient will demo LS flexion to mid-shin for ease with LE dressing. -Goal progressing; LS flexion to mid-shin with end-range pain (7/6/18)  3. Patient will demo 50% bridge for progression of core strength for prolonged activity. -Goal progressing; 50% bridge (7/6/18)  4. 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     PLAN  [x]  Upgrade activities as tolerated     [x]  Continue plan of care  [x]  Update interventions per flow sheet       []  Discharge due to:_  [x]  Other: cont to assess for SI alignment    Leyla Justice PTA 7/6/2018

## 2018-07-09 ENCOUNTER — APPOINTMENT (OUTPATIENT)
Dept: VASCULAR SURGERY | Age: 53
End: 2018-07-09
Attending: PHYSICIAN ASSISTANT
Payer: SELF-PAY

## 2018-07-09 ENCOUNTER — HOSPITAL ENCOUNTER (OUTPATIENT)
Dept: NON INVASIVE DIAGNOSTICS | Age: 53
Discharge: HOME | End: 2018-07-09
Attending: PHYSICIAN ASSISTANT
Payer: SELF-PAY

## 2018-07-09 VITALS
WEIGHT: 134 LBS | DIASTOLIC BLOOD PRESSURE: 113 MMHG | BODY MASS INDEX: 22.88 KG/M2 | SYSTOLIC BLOOD PRESSURE: 235 MMHG | HEIGHT: 64 IN

## 2018-07-09 DIAGNOSIS — I10 UNCONTROLLED HYPERTENSION: ICD-10-CM

## 2018-07-09 LAB
ECHO AV PEAK GRADIENT: 0 MMHG
ECHO AV PEAK VELOCITY: 0 CM/S
ECHO EST RA PRESSURE: 10 MMHG
ECHO LA VOL 2C: 62.48 ML (ref 22–52)
ECHO LA VOL 4C: 50.5 ML (ref 22–52)
ECHO LA VOLUME INDEX A2C: 37.87 ML/M2
ECHO LA VOLUME INDEX A4C: 30.61 ML/M2
ECHO LV EDV A2C: 94.1 ML
ECHO LV EDV A4C: 95.3 ML
ECHO LV EDV BP: 94.4 ML (ref 56–104)
ECHO LV EDV INDEX A4C: 57.8 ML/M2
ECHO LV EDV INDEX BP: 57.2 ML/M2
ECHO LV EDV NDEX A2C: 57 ML/M2
ECHO LV EJECTION FRACTION A2C: 71 %
ECHO LV EJECTION FRACTION A4C: 70 %
ECHO LV EJECTION FRACTION BIPLANE: 70.3 % (ref 55–100)
ECHO LV ESV A2C: 27.7 ML
ECHO LV ESV A4C: 28.2 ML
ECHO LV ESV BP: 28.1 ML (ref 19–49)
ECHO LV ESV INDEX A2C: 16.8 ML/M2
ECHO LV ESV INDEX A4C: 17.1 ML/M2
ECHO LV ESV INDEX BP: 17 ML/M2
ECHO LV INTERNAL DIMENSION DIASTOLIC: 4.63 CM (ref 3.9–5.3)
ECHO LV INTERNAL DIMENSION SYSTOLIC: 3.47 CM
ECHO LV ISOVOLUMETRIC RELAXATION TIME (IVRT): 49.9 MS
ECHO LV IVSD: 1.03 CM (ref 0.6–0.9)
ECHO LV MASS 2D: 187.3 G (ref 67–162)
ECHO LV MASS INDEX 2D: 113.5 G/M2
ECHO LV POSTERIOR WALL DIASTOLIC: 0.98 CM (ref 0.6–0.9)
ECHO LVOT DIAM: 1.88 CM
ECHO LVOT PEAK GRADIENT: 4.6 MMHG
ECHO LVOT PEAK VELOCITY: 107.6 CM/S
ECHO LVOT VTI: 22.47 CM
ECHO MV A VELOCITY: 114.34 CM/S
ECHO MV AREA PHT: 6.5 CM2
ECHO MV E DECELERATION TIME (DT): 117.6 MS
ECHO MV E VELOCITY: 0.92 CM/S
ECHO MV E/A RATIO: 0.8
ECHO MV PRESSURE HALF TIME (PHT): 34.1 MS
ECHO PULMONARY ARTERY SYSTOLIC PRESSURE (PASP): 12.8 MMHG
ECHO PVEIN A DURATION: 72.1 MS
ECHO PVEIN A VELOCITY: 25.12 CM/S
ECHO RIGHT VENTRICULAR SYSTOLIC PRESSURE (RVSP): 12.8 MMHG
ECHO TV REGURGITANT MAX VELOCITY: -83.44 CM/S
ECHO TV REGURGITANT PEAK GRADIENT: 2.8 MMHG

## 2018-07-09 PROCEDURE — 93306 TTE W/DOPPLER COMPLETE: CPT

## 2018-07-10 ENCOUNTER — HOSPITAL ENCOUNTER (OUTPATIENT)
Dept: PHYSICAL THERAPY | Age: 53
Discharge: HOME | End: 2018-07-10
Payer: SELF-PAY

## 2018-07-10 PROCEDURE — 97110 THERAPEUTIC EXERCISES: CPT

## 2018-07-10 PROCEDURE — 97014 ELECTRIC STIMULATION THERAPY: CPT

## 2018-07-10 NOTE — PROGRESS NOTES
PT DAILY TREATMENT NOTE     Patient Name: Marisol Smith  Date:7/10/2018  : 1965  [x]  Patient  Verified  Payor: SELF PAY / Plan: [facility] SELF PAY / Product Type: Self Pay /    In time:505   Out time: 603  Total Treatment Time (min): 62  Visit #: 5 of     Treatment Area: Chronic back pain [M54.9, G89.29]    SUBJECTIVE  Pain Level (0-10 scale): 5  Any medication changes, allergies to medications, adverse drug reactions, diagnosis change, or new procedure performed?: [x] No    [] Yes (see summary sheet for update)  Subjective functional status/changes:   [] No changes reported  \"I think its my job making me hurt. \"     OBJECTIVE  Modality rationale: decrease pain to improve the patients ability to increase activity tolerance    Min Type Additional Details   10 [x] Estim: []Att   [x]Unatt        []TENS instruct                  []IFC  [x]Premod   []NMES                     []Other:  []w/US   []w/ice   [x]w/heat  Position: semi-reclined with LEs elevated on wedge  Location: CS and LS     []  Traction: [] Cervical       []Lumbar                       [] Prone          []Supine                       []Intermittent   []Continuous Lbs:  [] before manual  [] after manual    []  Ultrasound: []Continuous   [] Pulsed                           []1MHz   []3MHz Location:  W/cm2:    []  Iontophoresis with dexamethasone         Location: [] Take home patch   [] In clinic    []  Ice     []  heat  []  Ice massage Position:  Location:    []  Vasopneumatic Device Pressure:       [] lo [] med [] hi   Temperature: [] lo [] med [] hi   [x] Skin assessment post-treatment:  [x]intact []redness- no adverse reaction       []redness  adverse reaction:     48 min Therapeutic Exercise:  [x] See flow sheet:    Rationale: increase ROM, increase strength, improve coordination, improve balance, increase proprioception and improve core strength  to improve the patients ability to progress to increased activity tolerance and QOL    0 min Manual Therapy:  SI check - symmetrical   Rationale: decrease pain, increase ROM, increase tissue extensibility and improve pelvic alignment to decrease asymmetries causing pain and limited function     X min Patient Education: [x] Review HEP     Other Objective/Functional Measures:     B shoulder AROM 127    LS flexion: fingertips to mid shin    % improvement 25%    Pain Level (0-10 scale) post treatment: 0    ASSESSMENT/Changes in Function:   Patient tolerated treatment progression well. Improvement in LS flexion ROM but not with shoulder ROM sec to co pain. Full AAROM on pulleys. Patient feels limited progress has been made because of job duties including prolonged walking on ships, ladders, long drives to [place], and gear she has to wear. Patient educated that progression is necessary for PT to be indicated. Will continue with 3 scheduled visits then reassess. Cont post treatment ES as it aids to decrease overall pain levels. Patient will continue to benefit from skilled PT services to modify and progress therapeutic interventions, address functional mobility deficits, address ROM deficits, address strength deficits, analyze and address soft tissue restrictions, analyze and cue movement patterns, analyze and modify body mechanics/ergonomics and assess and modify postural abnormalities to attain remaining goals. [x]  See Plan of Care  []  See progress note/recertification  []  See Discharge Summary         Progress towards goals / Updated goals: All goals reviewed and progressing appropriately as of 7/10/2018  Short Term Goals: To be accomplished in  1  weeks:  1. Pt will be independent and compliant with HEP Goal progressing - HEP est with no questions. HEP will be modified and progressed as appropriate 7/3/18  Long Term Goals: To be accomplished in  8-12  treatments:  1. Patient will increase FOTO score to 47/100 for indications of increased functional mobility.     2.  Patient will demo LS flexion to mid-shin for ease with LE dressing. -Goal progressing; LS flexion to mid-shin with end-range pain (7/6/18)  3. Patient will demo 50% bridge for progression of core strength for prolonged activity. -Goal progressing; 50% bridge (7/6/18)  4. Patient will demo CS rotation [facility] for ease and safety with driving.     PLAN  [x]  Upgrade activities as tolerated     [x]  Continue plan of care  [x]  Update interventions per flow sheet       []  Discharge due to:_  [x]  Other: cont 3 scheduled visits    Tammi Lopez, PT 7/10/2018

## 2018-07-11 ENCOUNTER — TELEPHONE (OUTPATIENT)
Dept: FAMILY MEDICINE CLINIC | Age: 53
End: 2018-07-11

## 2018-07-11 NOTE — TELEPHONE ENCOUNTER
Called home number. Left message on answering machine to return the call. This call was concerning appointment on today, labs needed. Patient may reschedule appointment for later time.

## 2018-07-12 ENCOUNTER — HOSPITAL ENCOUNTER (OUTPATIENT)
Dept: PHYSICAL THERAPY | Age: 53
End: 2018-07-12
Payer: SELF-PAY

## 2018-07-17 ENCOUNTER — HOSPITAL ENCOUNTER (OUTPATIENT)
Dept: PHYSICAL THERAPY | Age: 53
End: 2018-07-17
Payer: SELF-PAY

## 2018-07-19 ENCOUNTER — HOSPITAL ENCOUNTER (OUTPATIENT)
Dept: PHYSICAL THERAPY | Age: 53
Discharge: HOME | End: 2018-07-19
Payer: SELF-PAY

## 2018-07-19 PROCEDURE — 97014 ELECTRIC STIMULATION THERAPY: CPT

## 2018-07-19 PROCEDURE — 97110 THERAPEUTIC EXERCISES: CPT

## 2018-07-19 NOTE — PROGRESS NOTES
PT DAILY TREATMENT NOTE     Patient Name: Samaria Colón  Date:2018  : 1965  [x]  Patient  Verified  Payor: SELF PAY / Plan: [facility] SELF PAY / Product Type: Self Pay /    In time:4:41  Out time:5:51  Total Treatment Time (min): 70  Total Timed Codes (min): 55  1:1 Treatment Time (min): 55   Visit #: 6 of     Treatment Area: Chronic back pain [M54.9, G89.29]    SUBJECTIVE  Pain Level (0-10 scale): 6.5  Any medication changes, allergies to medications, adverse drug reactions, diagnosis change, or new procedure performed?: [x] No    [] Yes (see summary sheet for update)  Subjective functional status/changes:   [] No changes reported  Work bothers me    I triped - back went out and I had a mm spasm - tried to catch something and I fell and broke 2 toes. Pt arrived in walking boot.     Back spasms every day  - no pattern noted    OBJECTIVE  Modality rationale: decrease pain and increase tissue extensibility to improve the patients ability to improve gait, working tolerance, ADLs   Min Type Additional Details   15 [x] Estim: []Att   [x]Unatt        []TENS instruct                  []IFC  [x]Premod   []NMES                     []Other:  []w/US   []w/ice   [x]w/heat  Position:semi-reclined with LEs on wedge,   Location: C/S and L/S    []  Traction: [] Cervical       []Lumbar                       [] Prone          []Supine                       []Intermittent   []Continuous Lbs:  [] before manual  [] after manual    []  Ultrasound: []Continuous   [] Pulsed                           []1MHz   []3MHz Location:  W/cm2:    []  Iontophoresis with dexamethasone         Location: [] Take home patch   [] In clinic    []  Ice     []  heat  []  Ice massage Position:  Location:    []  Vasopneumatic Device Pressure:       [] lo [] med [] hi   Temperature: [] lo [] med [] hi   [x] Skin assessment post-treatment:  [x]intact []redness- no adverse reaction       []redness  adverse reaction:       55 (3 units) min Therapeutic Exercise:  [x] See flow sheet :   Rationale: increase ROM, increase strength and improve coordination to improve the patients ability to improve posture for work tolerance           x min Patient Education: [x] Review HEP    [] Progressed/Changed HEP based on:   [] positioning   [] body mechanics   [] transfers   [] heat/ice application        Other Objective/Functional Measures:   Shoulder AROM: flexion 134  Lumbar extension 25%  C/S AROMM: flexion 20, LSB 22, RSB 35  Hip flexion PROM: 115 (B). Pain Level (0-10 scale) post treatment: 5 \"much better! \"    ASSESSMENT/Changes in Function: pt progressing slowly with ROM and with pain. Limited at this time by new addition of walking boot for the L foot fx. Patient will continue to benefit from skilled PT services to modify and progress therapeutic interventions, address functional mobility deficits, address ROM deficits, address strength deficits, analyze and address soft tissue restrictions, analyze and cue movement patterns, analyze and modify body mechanics/ergonomics, assess and modify postural abnormalities and instruct in home and community integration to attain remaining goals. []  See Plan of Care  []  See progress note/recertification  []  See Discharge Summary         Progress towards goals / Updated goals:  Short Term Goals: To be accomplished in  1  weeks:  1.  Pt will be independent and compliant with HEP Goal progressing - HEP est with no questions. HEP will be modified and progressed as appropriate 7/3/18  Long Term Goals: To be accomplished in  8-12  treatments:  1.  Patient will increase FOTO score to 47/100 for indications of increased functional mobility.    2.  Patient will demo LS flexion to mid-shin for ease with LE dressing. -Goal progressing; LS flexion to mid-shin with end-range pain (7/6/18)  3. Patient will demo 50% bridge for progression of core strength for prolonged activity.  -Goal progressing; 75% bridge (7/19/18)  4. Patient will demo CS rotation [place] for ease and safety with driving. Limited to approx 25 to 50% at this time (7/19/18)    PLAN  [x]  Upgrade activities as tolerated     []  Continue plan of care  []  Update interventions per flow sheet       []  Discharge due to:_  [x]  Other:_ Last visit scheduled for tomorrow, 7/20. Reassess for PN vs D/C at that time.      Felisha Wallace, PT 7/19/2018  1:36 PM

## 2018-07-20 ENCOUNTER — HOSPITAL ENCOUNTER (OUTPATIENT)
Dept: LAB | Age: 53
Discharge: HOME | End: 2018-07-20
Payer: SELF-PAY

## 2018-07-20 ENCOUNTER — HOSPITAL ENCOUNTER (OUTPATIENT)
Dept: GENERAL RADIOLOGY | Age: 53
Discharge: HOME | End: 2018-07-20
Payer: SELF-PAY

## 2018-07-20 ENCOUNTER — APPOINTMENT (OUTPATIENT)
Dept: PHYSICAL THERAPY | Age: 53
End: 2018-07-20
Payer: SELF-PAY
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
## 2018-07-20 DIAGNOSIS — M54.2 ACUTE NECK PAIN: ICD-10-CM

## 2018-07-20 DIAGNOSIS — I10 UNCONTROLLED HYPERTENSION: ICD-10-CM

## 2018-07-20 LAB
ALBUMIN SERPL-MCNC: 3.9 G/DL (ref 3.4–5)
ALBUMIN/GLOB SERPL: 1.1 {RATIO} (ref 0.8–1.7)
ALP SERPL-CCNC: 78 U/L (ref 45–117)
ALT SERPL-CCNC: 31 U/L (ref 13–56)
ANION GAP SERPL CALC-SCNC: 8 MMOL/L (ref 3–18)
AST SERPL-CCNC: 26 U/L (ref 15–37)
ATRIAL RATE: 56 BPM
BASOPHILS # BLD: 0.1 K/UL (ref 0–0.1)
BASOPHILS NFR BLD: 1 % (ref 0–2)
BILIRUB SERPL-MCNC: 0.2 MG/DL (ref 0.2–1)
BUN SERPL-MCNC: 12 MG/DL (ref 7–18)
BUN/CREAT SERPL: 19 (ref 12–20)
CALCIUM SERPL-MCNC: 8.1 MG/DL (ref 8.5–10.1)
CALCULATED P AXIS, ECG09: 44 DEGREES
CALCULATED R AXIS, ECG10: 39 DEGREES
CALCULATED T AXIS, ECG11: 67 DEGREES
CHLORIDE SERPL-SCNC: 106 MMOL/L (ref 100–108)
CHOLEST SERPL-MCNC: 161 MG/DL
CO2 SERPL-SCNC: 28 MMOL/L (ref 21–32)
CREAT SERPL-MCNC: 0.64 MG/DL (ref 0.6–1.3)
DIAGNOSIS, 93000: NORMAL
DIFFERENTIAL METHOD BLD: ABNORMAL
EOSINOPHIL # BLD: 0.1 K/UL (ref 0–0.4)
EOSINOPHIL NFR BLD: 2 % (ref 0–5)
ERYTHROCYTE [DISTWIDTH] IN BLOOD BY AUTOMATED COUNT: 17.8 % (ref 11.6–14.5)
GLOBULIN SER CALC-MCNC: 3.5 G/DL (ref 2–4)
GLUCOSE SERPL-MCNC: 97 MG/DL (ref 74–99)
HCT VFR BLD AUTO: 29.7 % (ref 35–45)
HDLC SERPL-MCNC: 77 MG/DL (ref 40–60)
HDLC SERPL: 2.1 {RATIO} (ref 0–5)
HGB BLD-MCNC: 8.8 G/DL (ref 12–16)
LDLC SERPL CALC-MCNC: 68.6 MG/DL (ref 0–100)
LIPID PROFILE,FLP: ABNORMAL
LYMPHOCYTES # BLD: 1.6 K/UL (ref 0.9–3.6)
LYMPHOCYTES NFR BLD: 30 % (ref 21–52)
MCH RBC QN AUTO: 20.5 PG (ref 24–34)
MCHC RBC AUTO-ENTMCNC: 29.6 G/DL (ref 31–37)
MCV RBC AUTO: 69.2 FL (ref 74–97)
MONOCYTES # BLD: 0.4 K/UL (ref 0.05–1.2)
MONOCYTES NFR BLD: 8 % (ref 3–10)
NEUTS SEG # BLD: 3.1 K/UL (ref 1.8–8)
NEUTS SEG NFR BLD: 59 % (ref 40–73)
P-R INTERVAL, ECG05: 152 MS
PLATELET # BLD AUTO: 305 K/UL (ref 135–420)
PLATELET COMMENTS,PCOM: ABNORMAL
PMV BLD AUTO: 10.3 FL (ref 9.2–11.8)
POTASSIUM SERPL-SCNC: 3.8 MMOL/L (ref 3.5–5.5)
PROT SERPL-MCNC: 7.4 G/DL (ref 6.4–8.2)
Q-T INTERVAL, ECG07: 480 MS
QRS DURATION, ECG06: 92 MS
QTC CALCULATION (BEZET), ECG08: 463 MS
RBC # BLD AUTO: 4.29 M/UL (ref 4.2–5.3)
RBC MORPH BLD: ABNORMAL
SODIUM SERPL-SCNC: 142 MMOL/L (ref 136–145)
TRIGL SERPL-MCNC: 77 MG/DL (ref ?–150)
TSH SERPL DL<=0.05 MIU/L-ACNC: 5.34 UIU/ML (ref 0.36–3.74)
VENTRICULAR RATE, ECG03: 56 BPM
VLDLC SERPL CALC-MCNC: 15.4 MG/DL
WBC # BLD AUTO: 5.3 K/UL (ref 4.6–13.2)

## 2018-07-20 PROCEDURE — 72040 X-RAY EXAM NECK SPINE 2-3 VW: CPT

## 2018-07-20 PROCEDURE — 36415 COLL VENOUS BLD VENIPUNCTURE: CPT | Performed by: PHYSICIAN ASSISTANT

## 2018-07-20 PROCEDURE — 80061 LIPID PANEL: CPT | Performed by: PHYSICIAN ASSISTANT

## 2018-07-20 PROCEDURE — 93005 ELECTROCARDIOGRAM TRACING: CPT

## 2018-07-20 PROCEDURE — 80053 COMPREHEN METABOLIC PANEL: CPT | Performed by: PHYSICIAN ASSISTANT

## 2018-07-20 PROCEDURE — 85025 COMPLETE CBC W/AUTO DIFF WBC: CPT | Performed by: PHYSICIAN ASSISTANT

## 2018-07-20 PROCEDURE — 84443 ASSAY THYROID STIM HORMONE: CPT | Performed by: PHYSICIAN ASSISTANT

## 2018-07-23 ENCOUNTER — HOSPITAL ENCOUNTER (OUTPATIENT)
Dept: PHYSICAL THERAPY | Age: 53
Discharge: HOME | End: 2018-07-23
Payer: SELF-PAY

## 2018-07-23 PROCEDURE — 97014 ELECTRIC STIMULATION THERAPY: CPT

## 2018-07-23 PROCEDURE — 97110 THERAPEUTIC EXERCISES: CPT

## 2018-07-23 NOTE — PROGRESS NOTES
PT DAILY TREATMENT NOTE     Patient Name: Jas Galvan  Date:2018  : 1965  [x]  Patient  Verified  Payor: SELF PAY / Plan: [facility] SELF PAY / Product Type: Self Pay /    In time: 5:32 pm        Out time: 6:28 pm  Total Treatment Time (min): 64  Visit #: 7 of     Treatment Area: Chronic back pain [M54.9, G89.29]    SUBJECTIVE  Pain Level (0-10 scale): 5.5  Any medication changes, allergies to medications, adverse drug reactions, diagnosis change, or new procedure performed?: [x] No    [] Yes (see summary sheet for update)  Subjective functional status/changes:   [] No changes reported  \"I am walking better. Not straight like a board. \"    OBJECTIVE  Modality rationale: decrease pain and increase tissue extensibility to improve the patients ability to improve gait, working tolerance, ADLs   Min Type Additional Details   10 [x] Estim: []Att   [x]Unatt        []TENS instruct                  []IFC  [x]Premod   []NMES                     []Other:  []w/US   []w/ice   [x]w/heat  Position:semi-reclined with LEs on wedge,   Location: C/S and L/S    []  Traction: [] Cervical       []Lumbar                       [] Prone          []Supine                       []Intermittent   []Continuous Lbs:  [] before manual  [] after manual    []  Ultrasound: []Continuous   [] Pulsed                           []1MHz   []3MHz Location:  W/cm2:    []  Iontophoresis with dexamethasone         Location: [] Take home patch   [] In clinic    []  Ice     []  heat  []  Ice massage Position:  Location:    []  Vasopneumatic Device Pressure:       [] lo [] med [] hi   Temperature: [] lo [] med [] hi   [x] Skin assessment post-treatment:  [x]intact []redness- no adverse reaction       []redness  adverse reaction:       46 min Therapeutic Exercise:  [x] See flow sheet   Rationale: increase ROM, increase strength and improve coordination to improve the patients ability to improve posture for work tolerance           X min Patient Education: [x] Review HEP for D/C     Other Objective/Functional Measures:   Subjective improvement of 30-40% in sx since IE reported. FOTO score: 44/100 (at last assessment, 29/100)  Improvements: gait quality, return to driving, flexibility, ease with getting out of bed  Deficits: generalized stiffness with activity, reports of mm spasms several times a day  Shoulder AROM: flexion 134 deg   C/S AROM: flex 20 deg, LSB 22 deg, RSB 35 deg, (B) rotation WNL  L/S AROM: flex fingertips to mid-shin, ext 25% AROM  Hip flexion PROM: 115 deg, bilaterally  Core strength: 50% bridge  Pain: (B) 0, (W) 8    Mm spasms reported daily, but pt notes no pattern and unable to reproduce in clinic. Pain Level (0-10 scale) post treatment: 0    ASSESSMENT/Changes in Function:   See D/C. Patient will continue to benefit from skilled PT services to modify and progress therapeutic interventions, address functional mobility deficits, address ROM deficits, address strength deficits, analyze and address soft tissue restrictions, analyze and cue movement patterns, analyze and modify body mechanics/ergonomics, assess and modify postural abnormalities and instruct in home and community integration to attain remaining goals. [x]  See Discharge Summary         Progress towards goals / Updated goals:  1.  Patient will increase FOTO score to 47/100 for indications of increased functional mobility.  -Goal near met; inc to 44/100 from 29/100 at IE   2.  Patient will demo LS flexion to mid-shin for ease with LE dressing. -Goal met; LS flexion to mid-shin  3. Patient will demo 50% bridge for progression of core strength for prolonged activity. -Goal met; 50% bridge  4.   Patient will demo CS rotation [facility] for ease and safety with driving. -Goal met; AROM rotation for C/S WNL in all directions without, no spasms noted with full ROM    PLANt       [x]  Discharge due to: patient meeting goals    Amadeo Silva PTA  7/23/2018
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
## 2018-07-24 ENCOUNTER — TELEPHONE (OUTPATIENT)
Dept: FAMILY MEDICINE CLINIC | Age: 53
End: 2018-07-24

## 2018-07-24 NOTE — TELEPHONE ENCOUNTER
Called home number. Left message on answering machine to return the call. This call was concerning appointment needed. ----- Message from AHSAN Raman sent at 7/24/2018  7:45 AM EDT -----  Regarding: follow up needed  Please contact Ms. Ramirez to reschedule her follow up visit. We need to go over her labs, EKG, and echo. She did no-show her renal ultrasound as well. This will need to be rescheduled at her earliest convenience. thank you!

## 2018-07-24 NOTE — TELEPHONE ENCOUNTER
Called home number. Verified name and . Spoke with patient. Notified of message below. Patient called office earlier for appointment concerning her toes. Notified patient to keep appointment and call to schedule Renal ultrasound. Patient had no further questions or concerns.

## 2018-07-26 ENCOUNTER — OFFICE VISIT (OUTPATIENT)
Dept: FAMILY MEDICINE CLINIC | Age: 53
End: 2018-07-26

## 2018-07-26 VITALS
WEIGHT: 143 LBS | HEIGHT: 64 IN | RESPIRATION RATE: 16 BRPM | SYSTOLIC BLOOD PRESSURE: 160 MMHG | TEMPERATURE: 97.9 F | OXYGEN SATURATION: 98 % | BODY MASS INDEX: 24.41 KG/M2 | DIASTOLIC BLOOD PRESSURE: 96 MMHG | HEART RATE: 57 BPM

## 2018-07-26 DIAGNOSIS — I27.20 PULMONARY HYPERTENSION (HCC): ICD-10-CM

## 2018-07-26 DIAGNOSIS — D64.9 ANEMIA, UNSPECIFIED TYPE: ICD-10-CM

## 2018-07-26 DIAGNOSIS — I10 UNCONTROLLED HYPERTENSION: Primary | ICD-10-CM

## 2018-07-26 DIAGNOSIS — M54.2 ACUTE NECK PAIN: ICD-10-CM

## 2018-07-26 DIAGNOSIS — Z12.11 SCREENING FOR COLON CANCER: ICD-10-CM

## 2018-07-26 DIAGNOSIS — R79.89 ELEVATED TSH: ICD-10-CM

## 2018-07-26 DIAGNOSIS — M79.675 TOE PAIN, LEFT: ICD-10-CM

## 2018-07-26 RX ORDER — IBUPROFEN 800 MG/1
800 TABLET ORAL
Qty: 90 TAB | Refills: 0 | Status: SHIPPED | OUTPATIENT
Start: 2018-07-26 | End: 2018-09-21

## 2018-07-26 RX ORDER — SPIRONOLACTONE 25 MG/1
25 TABLET ORAL DAILY
Qty: 90 TAB | Refills: 0 | Status: SHIPPED | OUTPATIENT
Start: 2018-07-26 | End: 2019-06-10 | Stop reason: SDUPTHER

## 2018-07-26 RX ORDER — CYCLOBENZAPRINE HCL 5 MG
TABLET ORAL
Qty: 18 TAB | Refills: 0 | Status: SHIPPED | OUTPATIENT
Start: 2018-07-26 | End: 2018-08-13 | Stop reason: SDUPTHER

## 2018-07-26 NOTE — PROGRESS NOTES
1. Have you been to the ER, urgent care clinic since your last visit? Hospitalized since your last visit? No    2. Have you seen or consulted any other health care providers outside of the [address] [place] [address] since your last visit? Include any pap smears or colon screening.  No

## 2018-07-26 NOTE — PROGRESS NOTES
Patient: Misha Desai MRN: [id number]  SSN: [id number]    YOB: 1965  Age: 46 y.o. Sex: female      Date of Service: 7/26/2018   Provider: Randye Merlin, PA   Office Location:   [address] [place], [address]  Office Phone: [phone]  Office Fax: [phone]      REASON FOR VISIT:   Chief Complaint   Patient presents with    Results        VITALS:   Visit Vitals    BP (!) 160/96 (BP 1 Location: Left arm, BP Patient Position: Sitting)    Pulse (!) 57    Temp 97.9 °F (36.6 °C) (Oral)    Resp 16    Ht 5' 4\" (1.626 m)    Wt 143 lb (64.9 kg)    LMP 06/21/2018    SpO2 98%    BMI 24.55 kg/m2        MEDICATIONS:   Current Outpatient Prescriptions on File Prior to Visit   Medication Sig Dispense Refill    ibuprofen (MOTRIN) 800 mg tablet Take 1 Tab by mouth every eight (8) hours as needed for Pain. 90 Tab 0    cyclobenzaprine (FLEXERIL) 5 mg tablet Take 1-2 tabs up to 3 times daily as needed for pain and spasm. 18 Tab 0    hydrALAZINE (APRESOLINE) 100 mg tablet Take 1 Tab by mouth three (3) times daily. 90 Tab 0    amLODIPine (NORVASC) 10 mg tablet Take 1 Tab by mouth daily. Indications: hypertension 90 Tab 1    lisinopril (PRINIVIL, ZESTRIL) 40 mg tablet Take 1 Tab by mouth daily. 90 Tab 1    metoprolol tartrate (LOPRESSOR) 100 mg IR tablet Take 1 Tab by mouth two (2) times a day. 180 Tab 1    hydroCHLOROthiazide (HYDRODIURIL) 25 mg tablet Take 1 Tab by mouth daily. 30 Tab 3    citalopram (CELEXA) 10 mg tablet Take 1 Tab by mouth daily. 90 Tab 1    topiramate (TOPAMAX) 50 mg tablet Take 1 Tab by mouth two (2) times a day. 60 Tab 3    PEG 3350-Electrolytes (GO-LYTELY) 236-22.74-6.74 -5.86 gram suspension Take as directed 1 Bottle 0     No current facility-administered medications on file prior to visit.          ALLERGIES:   No Known Allergies     MEDICAL/SURGICAL HISTORY:  Past Medical History:   Diagnosis Date    Abnormal mammogram 3/2016    BIRADS 3 right breast    Hypertension     Stroke [facility]) 2016    CVA      Past Surgical History:   Procedure Laterality Date    HX GYN  1982             FAMILY HISTORY:  Family History   Problem Relation Age of Onset    No Known Problems Mother     No Known Problems Father     No Known Problems Sister     No Known Problems Brother     No Known Problems Maternal Aunt     No Known Problems Maternal Uncle     No Known Problems Paternal Aunt     No Known Problems Paternal Uncle     No Known Problems Maternal Grandmother     No Known Problems Paternal Grandmother     No Known Problems Paternal Grandfather     No Known Problems Other         SOCIAL HISTORY:  Social History   Substance Use Topics    Smoking status: Former Smoker     Quit date: 1999    Smokeless tobacco: Never Used    Alcohol use No             HISTORY OF PRESENT ILLNESS: Samaria Colón is a 46 y.o. female who presents to the office for a routine follow up visit. Here today in follow up of uncontrolled hypertension, and to review results of recent EKG, echocardiogram, and labs. Patient missed her appointment for the renal arterial duplex study and is aware that she still needs to reschedule this. She has no acute concerns today, other than that she stubbed her Left second toe while helping her son move last week and think she might have broken it as she heard a \"pop. \" Patient fractured her 5th toe last year, and has a walking boot from that injury that she has been wearing with some relief. Her echocardiogram showed normal EF with no wall motion abnormalities though pulmonary artery pressure was elevated at 35 mmHg systolic. Her EKG showed no change from previous. Labs revealed a microcytic anemia and mildly elevated TSH, but were otherwise WNL. REVIEW OF SYSTEMS:  Review of Systems   Constitutional: Negative for chills, fever and malaise/fatigue.    Respiratory: Negative for cough, shortness of breath and wheezing. Cardiovascular: Negative for chest pain, palpitations and leg swelling. Gastrointestinal: Negative for abdominal pain, constipation, diarrhea, nausea and vomiting. Musculoskeletal: Positive for neck pain. PHYSICAL EXAMINATION:  Physical Exam   Constitutional: She is oriented to person, place, and time and well-developed, well-nourished, and in no distress. Cardiovascular: Normal rate, regular rhythm and normal heart sounds. Exam reveals no gallop and no friction rub. No murmur heard. Pulmonary/Chest: Effort normal and breath sounds normal. She has no wheezes. She has no rales. Neurological: She is alert and oriented to person, place, and time. Gait normal.   Skin: Skin is warm and dry. No rash noted. Psychiatric: Mood, memory and affect normal.        RESULTS:    EKG 7/20/18  Component Value Ref Range & Units Status   Ventricular Rate 56 BPM Final   Atrial Rate 56 BPM Final   P-R Interval 152 ms Final   QRS Duration 92 ms Final   Q-T Interval 480 ms Final   QTC Calculation (Bezet) 463 ms Final   Calculated P Axis 44 degrees Final   Calculated R Axis 39 degrees Final   Calculated T Axis 67 degrees Final   Diagnosis   Final   Sinus bradycardia   Minimal voltage criteria for LVH, may be normal variant   Septal infarct (cited on or before 01-AUG-2016)   Abnormal ECG   When compared with ECG of 18-APR-2018 15:12,   No significant change was found   Confirmed by Alesha Del Toro ([id number]) on 7/20/2018 12:04:31 PM        07/09/18   ECHO ADULT COMPLETE 07/09/2018 7/9/2018    Narrative · Estimated left ventricular ejection fraction is 56 - 60%. Normal left   ventricular wall motion, no regional wall motion abnormality noted. · Left atrial cavity size is mildly dilated. · Trace mitral valve stenosis. Mild mitral valve regurgitation. · Pulmonary arterial systolic pressure is 35 mmHg.         Signed by: Ruben Cabrera MD      Lab Results   Component Value Date/Time WBC 5.3 07/20/2018 09:14 AM    HGB 8.8 (L) 07/20/2018 09:14 AM    HCT 29.7 (L) 07/20/2018 09:14 AM    PLATELET 812 39/14/7487 09:14 AM    MCV 69.2 (L) 07/20/2018 09:14 AM      Lab Results   Component Value Date/Time    Sodium 142 07/20/2018 09:14 AM    Potassium 3.8 07/20/2018 09:14 AM    Chloride 106 07/20/2018 09:14 AM    CO2 28 07/20/2018 09:14 AM    Anion gap 8 07/20/2018 09:14 AM    Glucose 97 07/20/2018 09:14 AM    BUN 12 07/20/2018 09:14 AM    Creatinine 0.64 07/20/2018 09:14 AM    BUN/Creatinine ratio 19 07/20/2018 09:14 AM    GFR est AA >60 07/20/2018 09:14 AM    GFR est non-AA >60 07/20/2018 09:14 AM    Calcium 8.1 (L) 07/20/2018 09:14 AM    Bilirubin, total 0.2 07/20/2018 09:14 AM    AST (SGOT) 26 07/20/2018 09:14 AM    Alk. phosphatase 78 07/20/2018 09:14 AM    Protein, total 7.4 07/20/2018 09:14 AM    Albumin 3.9 07/20/2018 09:14 AM    Globulin 3.5 07/20/2018 09:14 AM    A-G Ratio 1.1 07/20/2018 09:14 AM    ALT (SGPT) 31 07/20/2018 09:14 AM      Lab Results   Component Value Date/Time    TSH 5.34 (H) 07/20/2018 09:14 AM      Lab Results   Component Value Date/Time    Cholesterol, total 161 07/20/2018 09:14 AM    HDL Cholesterol 77 (H) 07/20/2018 09:14 AM    LDL, calculated 68.6 07/20/2018 09:14 AM    VLDL, calculated 15.4 07/20/2018 09:14 AM    Triglyceride 77 07/20/2018 09:14 AM    CHOL/HDL Ratio 2.1 07/20/2018 09:14 AM        ASSESSMENT/PLAN:  Diagnoses and all orders for this visit:    1. Uncontrolled hypertension  - BP looks better today, but is still not at goal. I am still waiting on her renal artery ultrasound as part of her secondary HTN workup. She was advised to reschedule this  - In the meantime, will add spironolactone as below  Orders:    -     spironolactone (ALDACTONE) 25 mg tablet; Take 1 Tab by mouth daily.     2. Pulmonary hypertension (HCC)  - I am not sure what to make of the elevated pulmonary artery systolic pressure on her echocardiogram, but patient volunteers to me today that her partner has been concerned about her having sleep apnea, as she snores and gasps during her sleep. - I am going to start by referring her to pulmonary/sleep medicine for a consult to r/o pulmonary etiologies and sleep apnea. - Should consider cardiology referral in the near future as well, depending on the outcome of pulmonary workup  Orders:    -     REFERRAL TO PULMONARY DISEASE    3. Anemia, unspecified type  - Check iron studies  - Refer to GI for colonoscopy, as she is due for screening and given her age, need to r/o GI bleeding as a possible source of anemia (though she is still getting a regular menstrual period)  Orders:    -     IRON PROFILE; Future  -     FERRITIN; Future  -     REFERRAL TO GASTROENTEROLOGY    4. Elevated TSH   - Mildly elevated, will monitor for now  - Advised patient to repeat labs in 6-8 weeks  Orders:    -     TSH 3RD GENERATION; Future    5. Screening for colon cancer  Orders:    -     REFERRAL TO GASTROENTEROLOGY    6. Acute neck pain  - We briefly reviewed the results of her c spine x-ray today. Suggestive of worsening degenerative changes  - For now will refill meds, but will defer further workup to Dr. Kayla Huffman. patient advised to keep appointment as scheduled in early August  Orders:    -     cyclobenzaprine (FLEXERIL) 5 mg tablet; Take 1-2 tabs up to 3 times daily as needed for pain and spasm.  -     ibuprofen (MOTRIN) 800 mg tablet; Take 1 Tab by mouth every eight (8) hours as needed for Pain. 7. Toe pain, left  - X-ray ordered to r/o fracture  - Rest, ice, nsaids  - OK to continue to wear boot as needed  Orders:    -     XR 2ND TOE LT MIN 2 V; Future      Follow-up Disposition:  Return in about 2 months (around 9/26/2018). All questions answered. Patient expresses understanding and agrees with the plan as detailed above.     PATIENT CARE TEAM:   Patient Care Team:  AHSAN Estes as PCP - General (Physician Assistant)  Felisa Patiño MD as Consulting Provider (Neurology)  Janette Segovia NP (Nurse Practitioner)  Madonna Joe MD (Colon and Rectal Surgery)       AHSAN Campos   July 26, 2018    1:42 PM

## 2018-07-26 NOTE — PATIENT INSTRUCTIONS

## 2018-07-26 NOTE — LETTER
7/26/2018 12:39 PM 
 
Ms. [employer] [address] [place] [phone] To Whom It May Concern: Ms. Luis is a patient of our practice. Please excuse her from jury duty due to a variety of chronic medical issues. If you have any questions regarding this matter, please have the patient contact our office at [phone].  
 
 
 
Sincerely, 
 
 
 
 
 
AHSAN Ayala
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
## 2018-07-30 ENCOUNTER — HOSPITAL ENCOUNTER (OUTPATIENT)
Dept: LAB | Age: 53
Discharge: HOME | End: 2018-07-30

## 2018-07-30 ENCOUNTER — HOSPITAL ENCOUNTER (OUTPATIENT)
Dept: GENERAL RADIOLOGY | Age: 53
Discharge: HOME | End: 2018-07-30
Payer: SELF-PAY

## 2018-07-30 DIAGNOSIS — M79.675 TOE PAIN, LEFT: ICD-10-CM

## 2018-07-30 LAB — SENTARA SPECIMEN COL,SENBCF: NORMAL

## 2018-07-30 PROCEDURE — 99001 SPECIMEN HANDLING PT-LAB: CPT | Performed by: PHYSICIAN ASSISTANT

## 2018-07-30 PROCEDURE — 73660 X-RAY EXAM OF TOE(S): CPT

## 2018-07-30 NOTE — PROGRESS NOTES
Called home number. Verified name and . Spoke with patient. Patient notified of results below per Louie FOREMAN. Patient understood the reason for call and had no further questions or concerns.

## 2018-07-31 LAB
FE % SATURATION,PSAT: 5 % (ref 20–50)
FERRITIN SERPL-MCNC: 4 NG/ML (ref 10–291)
IRON,IRN: 20 MCG/DL (ref 30–160)
TIBC,TIBC: 422 MCG/DL (ref 228–428)
TSH SERPL DL<=0.005 MIU/L-ACNC: 4.75 MCU/ML (ref 0.27–4.2)
UIBC SERPL-MCNC: 402 MCG/DL (ref 110–370)

## 2018-07-31 NOTE — PROGRESS NOTES
Called home number. Verified name and . Spoke with patient. Patient notified of results below per Yudith FOREMAN. Patient understood the reason for call and had no further questions or concerns.

## 2018-07-31 NOTE — PROGRESS NOTES
Please let patient know that her iron levels were very low. She has an upcoming appointment with GI to r/o gastrointestinal bleeding, but in the meantime, she may feel more energetic if she starts taking an OTC iron supplement. Would suggest ferrous sulfate 325 mg BID, if she can tolerate it. This can cause some constipation, so she may want to consider taking it with a stool softener. The lab ran her TSH (thyroid) with the iron studies even though that test wasn't supposed to be done for another 6 weeks. It is already improving a bit. Will keep an eye on it for now and repeat it at her next visit.

## 2018-08-09 ENCOUNTER — OFFICE VISIT (OUTPATIENT)
Dept: ORTHOPEDIC SURGERY | Age: 53
End: 2018-08-09

## 2018-08-09 ENCOUNTER — TELEPHONE (OUTPATIENT)
Dept: ORTHOPEDIC SURGERY | Age: 53
End: 2018-08-09

## 2018-08-09 VITALS
BODY MASS INDEX: 23.39 KG/M2 | RESPIRATION RATE: 16 BRPM | WEIGHT: 137 LBS | SYSTOLIC BLOOD PRESSURE: 236 MMHG | HEIGHT: 64 IN | DIASTOLIC BLOOD PRESSURE: 114 MMHG | HEART RATE: 62 BPM

## 2018-08-09 DIAGNOSIS — M51.36 DDD (DEGENERATIVE DISC DISEASE), LUMBAR: ICD-10-CM

## 2018-08-09 DIAGNOSIS — M47.817 LUMBOSACRAL SPONDYLOSIS WITHOUT MYELOPATHY: ICD-10-CM

## 2018-08-09 DIAGNOSIS — M54.50 LOW BACK PAIN WITHOUT SCIATICA, UNSPECIFIED BACK PAIN LATERALITY, UNSPECIFIED CHRONICITY: Primary | ICD-10-CM

## 2018-08-09 DIAGNOSIS — M41.9 SCOLIOSIS OF LUMBAR SPINE, UNSPECIFIED SCOLIOSIS TYPE: ICD-10-CM

## 2018-08-09 DIAGNOSIS — M54.16 LUMBAR NEURITIS: ICD-10-CM

## 2018-08-09 NOTE — TELEPHONE ENCOUNTER
Called patient and left voice mail asking her to call our office back because I wanted to confirm with her that she made a follow up appointment with her PCP.

## 2018-08-09 NOTE — TELEPHONE ENCOUNTER
Called patient [phone] to inquire if she had made a follow up appointment with her PCP yet. Patient states she forgot and will call her right know. I informed patient that I will call her back in 30 minutes to confirm she made a follow up appointment. Patient verbalized understanding.

## 2018-08-09 NOTE — TELEPHONE ENCOUNTER
Patient is currently being seen and her blood pressure was 236/114 and she is asymptomatic. Patient states she is currently taking all of her blood pressure medications. I have called patients pharmacy [facility] [phone] and spoke to pharmacist Mary Carmen Bender and inquired when was the last time patient had picked up her prescriptions for Hydralazine 100 mg, Amlodipine 10 mg, Lisionpril 40 mg, HCTZ 25 mg and was informed that it had been a minute since they have been picked up because they were on hold due to her not having insurance. The Metoprolol 100 mg was last picked up on 6/10/18 and the Spironolactone 25 mg is also on hold due to patient not having insurance. But Mary Carmen Bender states the patient did  her Flexeril 5 mg and Ibuprofen 800 mg on 7/26/18. Please follow up with patient. Thank you. Spoke to Kenyon Stewart and informed her of the above message. I was informed to gave the patient to call their office to make a follow up appointment to be evaluated for her high blood pressure. I informed patient to call her PCP and make a follow up appointment. Patient verbalized understanding.

## 2018-08-09 NOTE — MR AVS SNAPSHOT
[address] [facility] [address] [facility] [address] 
[phone] Patient: Kei Celeste MRN: [id number] :1965 Visit Information Date & Time Provider Department Dept. Phone Encounter #  
 2018 10:05 AM Collins Loya  [facility], [address] and [facility] [phone] [id number] Follow-up Instructions Return for following MRI. Follow-up and Disposition History Your Appointments 2018  5:30 PM  
FOLLOW UP EXAM with AHSAN [facility] ([facility]) Appt Note: routine f/u 2mo [address] [facility] [address] [facility] [address] [place] [address] Upcoming Health Maintenance Date Due DTaP/Tdap/Td series (1 - Tdap) 1986 FOBT Q 1 YEAR AGE 50-75 3/2/2017 Influenza Age 5 to Adult 2018 BREAST CANCER SCRN MAMMOGRAM 2018 PAP AKA CERVICAL CYTOLOGY 2020 Allergies as of 2018  Review Complete On: 2018 By: Collins Loya MD  
 No Known Allergies Current Immunizations  Reviewed on 2016 No immunizations on file. Not reviewed this visit You Were Diagnosed With   
  
 Codes Comments Low back pain without sciatica, unspecified back pain laterality, unspecified chronicity    -  Primary ICD-10-CM: M54.5 ICD-9-CM: 724.2 Lumbosacral spondylosis without myelopathy     ICD-10-CM: E74.151 ICD-9-CM: 721.3 DDD (degenerative disc disease), lumbar     ICD-10-CM: M51.36 
ICD-9-CM: 722.52 Lumbar neuritis     ICD-10-CM: M54.16 
ICD-9-CM: 724.4 Scoliosis of lumbar spine, unspecified scoliosis type     ICD-10-CM: M41.9 ICD-9-CM: 737.30 Vitals BP Pulse Resp Height(growth percentile) Weight(growth percentile) BMI  
 (!) 236/114 62 16 5' 4\" (1.626 m) 137 lb (62.1 kg) 23.52 kg/m2 OB Status Smoking Status Having regular periods Former Smoker BMI and BSA Data Body Mass Index Body Surface Area  
 23.52 kg/m 2 1.67 m 2 Preferred Pharmacy Pharmacy Name Phone [facility] [phone] Your Updated Medication List  
  
   
This list is accurate as of 8/9/18 12:56 PM.  Always use your most recent med list. amLODIPine 10 mg tablet Commonly known as:  Daryl Thomas Take 1 Tab by mouth daily. Indications: hypertension  
  
 citalopram 10 mg tablet Commonly known as:  Crystal Hook Take 1 Tab by mouth daily. cyclobenzaprine 5 mg tablet Commonly known as:  FLEXERIL Take 1-2 tabs up to 3 times daily as needed for pain and spasm. hydrALAZINE 100 mg tablet Commonly known as:  APRESOLINE Take 1 Tab by mouth three (3) times daily. hydroCHLOROthiazide 25 mg tablet Commonly known as:  HYDRODIURIL Take 1 Tab by mouth daily. ibuprofen 800 mg tablet Commonly known as:  MOTRIN Take 1 Tab by mouth every eight (8) hours as needed for Pain. lisinopril 40 mg tablet Commonly known as:  Lizz Shames Take 1 Tab by mouth daily. metoprolol tartrate 100 mg IR tablet Commonly known as:  LOPRESSOR Take 1 Tab by mouth two (2) times a day. PEG 3350-Electrolytes 236-22.74-6.74 -5.86 gram suspension Commonly known as:  GO-LYTELY Take as directed  
  
 spironolactone 25 mg tablet Commonly known as:  ALDACTONE Take 1 Tab by mouth daily. topiramate 50 mg tablet Commonly known as:  TOPAMAX Take 1 Tab by mouth two (2) times a day. We Performed the Following AMB POC XRAY, SPINE, LUMBOSACRAL; 2 O [89188 CPT(R)] Follow-up Instructions Return for following MRI. To-Do List   
 08/16/2018 Imaging:  MRI LUMB SPINE WO CONT   
  
 08/18/2018 11:30 AM  
  Appointment with [facility] MRI RM 1 at [address] MRI ([phone]) GENERAL INSTRUCTIONS  Bring information (ID card) if you have any medically implanted devices. You will be required to lie still while the procedure is being performed. Remove any jewelry (including body piercing, hairpins) prior to MRI. If you have had a creatinine level drawn within the past 30 days, please bring most recent results to your appt. Bring any films, CD's, and reports related to your study with you on the day of your exam.  This only includes studies done outside of [address] [place] [address], [place] [address], Claritza [address]aly [address], and [facility]. Bring a complete list of all medications you are currently taking to include prescriptions, over-the-counter meds, herbals, vitamins & any dietary supplements. If you were given medications for claustrophobia or anxiety, please arrange to have someone drive you to your appointment. QUESTIONS  Notify the MRI Department if you have any questions concerning your study. Claritza [address] 02 - [phone] [facility] [address] [place] [address] - [phone] [facility]! Dear [employer]: 
Thank you for requesting a Epoque account. Our records indicate that you already have an active Epoque account. You can access your account anytime at [email or web address] Did you know that you can access your hospital and ER discharge instructions at any time in Epoque? You can also review all of your test results from your hospital stay or ER visit. Additional Information If you have questions, please visit the Frequently Asked Questions section of the Epoque website at [email or web address] Remember, Epoque is NOT to be used for urgent needs. For medical emergencies, dial 911. Now available from your iPhone and Android! Please provide this summary of care documentation to your next provider. Your primary care clinician is listed as Clari Seay.  If you have any questions after today's visit, please call [phone].
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
## 2018-08-13 ENCOUNTER — OFFICE VISIT (OUTPATIENT)
Dept: FAMILY MEDICINE CLINIC | Age: 53
End: 2018-08-13

## 2018-08-13 VITALS
BODY MASS INDEX: 23.08 KG/M2 | SYSTOLIC BLOOD PRESSURE: 198 MMHG | TEMPERATURE: 98 F | RESPIRATION RATE: 12 BRPM | HEART RATE: 66 BPM | OXYGEN SATURATION: 98 % | HEIGHT: 64 IN | WEIGHT: 135.2 LBS | DIASTOLIC BLOOD PRESSURE: 108 MMHG

## 2018-08-13 DIAGNOSIS — G89.29 CHRONIC BILATERAL LOW BACK PAIN WITHOUT SCIATICA: ICD-10-CM

## 2018-08-13 DIAGNOSIS — M54.16 LUMBAR NEURITIS: ICD-10-CM

## 2018-08-13 DIAGNOSIS — I10 UNCONTROLLED HYPERTENSION: Primary | ICD-10-CM

## 2018-08-13 DIAGNOSIS — M54.50 CHRONIC BILATERAL LOW BACK PAIN WITHOUT SCIATICA: ICD-10-CM

## 2018-08-13 RX ORDER — CYCLOBENZAPRINE HCL 5 MG
5 TABLET ORAL
Qty: 30 TAB | Refills: 0 | Status: SHIPPED | OUTPATIENT
Start: 2018-08-13 | End: 2018-12-11 | Stop reason: SDUPTHER

## 2018-08-13 NOTE — PATIENT INSTRUCTIONS

## 2018-08-13 NOTE — PROGRESS NOTES
Chief Complaint   Patient presents with    Hypertension    Results    Medication Refill     Visit Vitals    BP (!) 200/110 (BP 1 Location: Left arm, BP Patient Position: Sitting)    Pulse 66    Temp 98 °F (36.7 °C) (Oral)    Resp 12    Ht 5' 4\" (1.626 m)    Wt 135 lb 3.2 oz (61.3 kg)    SpO2 98%    BMI 23.21 kg/m2     Patient is not fasting. Patient in room # 5.     1. Have you been to the ER, urgent care clinic since your last visit? Hospitalized since your last visit? No    2. Have you seen or consulted any other health care providers outside of the [address] [place] [address] since your last visit? Include any pap smears or colon screening. No     Reviewed. Colonoscopy to be rescheduled. Requested Prescriptions     Pending Prescriptions Disp Refills    cyclobenzaprine (FLEXERIL) 5 mg tablet 18 Tab 0     Sig: Take 1-2 tabs up to 3 times daily as needed for pain and spasm.

## 2018-08-13 NOTE — PROGRESS NOTES
Patient: June Wilks MRN: [id number]  SSN: [id number]    YOB: 1965  Age: 48 y.o. Sex: female      Date of Service: 8/13/2018   Provider: AHSAN Estes   Office Location:   [address] [place], [address]  Office Phone: [phone]  Office Fax: [phone]      REASON FOR VISIT:   Chief Complaint   Patient presents with    Hypertension    Results    Medication Refill        VITALS:   Visit Vitals    BP (!) 200/110 (BP 1 Location: Left arm, BP Patient Position: Sitting)  Comment: manual    Pulse 66    Temp 98 °F (36.7 °C) (Oral)    Resp 12    Ht 5' 4\" (1.626 m)    Wt 135 lb 3.2 oz (61.3 kg)    LMP 07/27/2018    SpO2 98%    BMI 23.21 kg/m2        MEDICATIONS:   Current Outpatient Prescriptions on File Prior to Visit   Medication Sig Dispense Refill    cyclobenzaprine (FLEXERIL) 5 mg tablet Take 1-2 tabs up to 3 times daily as needed for pain and spasm. 18 Tab 0    ibuprofen (MOTRIN) 800 mg tablet Take 1 Tab by mouth every eight (8) hours as needed for Pain. 90 Tab 0    spironolactone (ALDACTONE) 25 mg tablet Take 1 Tab by mouth daily. 90 Tab 0    hydrALAZINE (APRESOLINE) 100 mg tablet Take 1 Tab by mouth three (3) times daily. 90 Tab 0    amLODIPine (NORVASC) 10 mg tablet Take 1 Tab by mouth daily. Indications: hypertension 90 Tab 1    lisinopril (PRINIVIL, ZESTRIL) 40 mg tablet Take 1 Tab by mouth daily. 90 Tab 1    metoprolol tartrate (LOPRESSOR) 100 mg IR tablet Take 1 Tab by mouth two (2) times a day. 180 Tab 1    hydroCHLOROthiazide (HYDRODIURIL) 25 mg tablet Take 1 Tab by mouth daily. 30 Tab 3    citalopram (CELEXA) 10 mg tablet Take 1 Tab by mouth daily. 90 Tab 1    topiramate (TOPAMAX) 50 mg tablet Take 1 Tab by mouth two (2) times a day.  60 Tab 3    PEG 3350-Electrolytes (GO-LYTELY) 236-22.74-6.74 -5.86 gram suspension Take as directed 1 Bottle 0     No current facility-administered medications on file prior to visit. ALLERGIES:   No Known Allergies     MEDICAL/SURGICAL HISTORY:  Past Medical History:   Diagnosis Date    Abnormal mammogram 3/2016    BIRADS 3 right breast    Hypertension     Stroke ([address] 2016    CVA      Past Surgical History:   Procedure Laterality Date    HX GYN  1982             FAMILY HISTORY:  Family History   Problem Relation Age of Onset    No Known Problems Mother     No Known Problems Father     No Known Problems Sister     No Known Problems Brother     No Known Problems Maternal Aunt     No Known Problems Maternal Uncle     No Known Problems Paternal Aunt     No Known Problems Paternal Uncle     No Known Problems Maternal Grandmother     No Known Problems Paternal Grandmother     No Known Problems Paternal Grandfather     No Known Problems Other         SOCIAL HISTORY:  Social History   Substance Use Topics    Smoking status: Former Smoker     Quit date: 1999    Smokeless tobacco: Never Used    Alcohol use No           HISTORY OF PRESENT ILLNESS: Linn Caruso is a 48 y.o. female who presents to the office for a routine follow up visit. Here today in follow up of HTN at the request of the [facility]. Patient saw Dr. Cooper Moeller last week for her chronic back pain, and BP was very high (as it has been for some time). The nurse at that visit contacted patient's pharmacy to check compliance. The pharmacist informed the nurse that patient's prescriptions had been on hold, and that she had only filled her flexeril and ibuprofen. Patient admits that she does not have prescription drug coverage right now, but that she has been taking all of her meds except for the newest rx, spironolactone, which she never picked up. REVIEW OF SYSTEMS:  Review of Systems   Eyes: Negative for blurred vision and double vision. Respiratory: Negative for shortness of breath. Cardiovascular: Negative for chest pain.    Gastrointestinal: Negative for abdominal pain, nausea and vomiting. Neurological: Negative for dizziness. PHYSICAL EXAMINATION:  Physical Exam   Constitutional: She is oriented to person, place, and time and well-developed, well-nourished, and in no distress. Cardiovascular: Normal rate, regular rhythm and normal heart sounds. Exam reveals no gallop and no friction rub. No murmur heard. Pulmonary/Chest: Effort normal and breath sounds normal. She has no wheezes. She has no rales. Neurological: She is alert and oriented to person, place, and time. Gait normal.   Skin: Skin is warm and dry. No rash noted. Psychiatric: Mood, memory and affect normal.        RESULTS:  No results found for this visit on 08/13/18. ASSESSMENT/PLAN:  Diagnoses and all orders for this visit:    1. Uncontrolled hypertension  - Reviewed with patient that Dr. Fifi Cuellar nurse was very concerned about her, and this is why the pharmacy was contacted. Patient is adamant that she has been taking her meds as prescribed. The only one she has not filled yet is the spironolactone, and she will stop by the pharmacy today or tomorrow to pick this up. - I have asked patient to bring her pill bottles to her next appointment to affirm compliance and to make sure she is taking these properly. Explained that it is very unusual to have blood pressure refractory to 6 different meds without some underlying secondary cause. - She was advised that we have plenty of resources to assist her with her meds, if cost is a concern. - She had a normal renal duplex study in October of 2015. She will be repeating this soon. She missed her appointment for her recent study and will call to reschedule it. - If patient proves she is compliant with her pills and renal duplex is normal, we may need to consider expanding the differential to include things like a pheochromocytoma or other adrenal disorders.   - She is to be seeing pulm/sleep soon regarding her pulmonary HTN and possible sleep apnea. Consider cardiology referral as well. - Counseled patient on the risks of long-term uncontrolled HTN. She is very high risk for a cardiovascular event or stroke. 2. Chronic bilateral low back pain without sciatica  3. Lumbar neuritis  - Temporary refill given. Discussed with patient that now that she is following with Dr. Shola Cunningham, I will defer to his judgment regarding long-term management of back pain   Orders:    -     cyclobenzaprine (FLEXERIL) 5 mg tablet; Take 1 Tab by mouth nightly as needed for Muscle Spasm(s). Follow-up Disposition:  Return for follow up as scheduled, or after renal ultrasound. All questions answered. Patient expresses understanding and agrees with the plan as detailed above.     PATIENT CARE TEAM:   Patient Care Team:  AHSAN Kline as PCP - General (Physician Assistant)  Renu Carter MD as Consulting Provider (Neurology)  Jus Pedro NP (Nurse Practitioner)  Salena Gerardo MD (Colon and Rectal Surgery)       AHSAN Kline   August 13, 2018    4:53 PM

## 2018-08-13 NOTE — MR AVS SNAPSHOT
[address] [facility] [address] [facility] 
[phone] Patient: Pam Yanez MRN: [id number] :1965 Visit Information Date & Time Provider Department Dept. Phone Encounter #  
 2018  4:00 PM Francis Murphy, [address] [facility] [address] [id number] Follow-up Instructions Return for as scheduled. Your Appointments 2018  9:20 AM  
DIAG TEST F/U with Lori Lr MD  
[facility] ([facility]) Appt Note: mri fu  
 [address] [place] [address]  
[phone]  
  
   
 [address] [facility],[address] [facility]  
  
    
 2018  5:30 PM  
FOLLOW UP EXAM with AHSAN Celaya [facility] ([facility]) Appt Note: routine f/u 2mo [address] [place] [address] [address] [facility]  
[address] [facility] [address] [facility] Upcoming Health Maintenance Date Due DTaP/Tdap/Td series (1 - Tdap) 1986 FOBT Q 1 YEAR AGE 50-75 3/2/2017 Influenza Age 5 to Adult 2018 BREAST CANCER SCRN MAMMOGRAM 2018 PAP AKA CERVICAL CYTOLOGY 2020 Allergies as of 2018  Review Complete On: 2018 By: AHSAN Celaya No Known Allergies Current Immunizations  Reviewed on 2016 No immunizations on file. Not reviewed this visit You Were Diagnosed With   
  
 Codes Comments Uncontrolled hypertension    -  Primary ICD-10-CM: I10 
ICD-9-CM: 401.9 Chronic bilateral low back pain without sciatica     ICD-10-CM: M54.5, G89.29 ICD-9-CM: 724.2, 338.29 Lumbar neuritis     ICD-10-CM: M54.16 
ICD-9-CM: 724.4 Vitals BP Pulse Temp Resp Height(growth percentile) Weight(growth percentile)  (!) 198/108 66 98 °F (36.7 °C) (Oral) 12 5' 4\" (1.626 m) 135 lb 3.2 oz (61.3 kg) LMP SpO2 BMI OB Status Smoking Status 07/27/2018 98% 23.21 kg/m2 Having regular periods Former Smoker Vitals History BMI and BSA Data Body Mass Index Body Surface Area  
 23.21 kg/m 2 1.66 m 2 Preferred Pharmacy Pharmacy Name Phone [facility] [address] [facility] [phone]. [phone] Your Updated Medication List  
  
   
This list is accurate as of 8/13/18  4:40 PM.  Always use your most recent med list. amLODIPine 10 mg tablet Commonly known as:  Mclain Mullet Take 1 Tab by mouth daily. Indications: hypertension  
  
 cyclobenzaprine 5 mg tablet Commonly known as:  FLEXERIL Take 1 Tab by mouth nightly as needed for Muscle Spasm(s). hydrALAZINE 100 mg tablet Commonly known as:  APRESOLINE Take 1 Tab by mouth three (3) times daily. hydroCHLOROthiazide 25 mg tablet Commonly known as:  HYDRODIURIL Take 1 Tab by mouth daily. ibuprofen 800 mg tablet Commonly known as:  MOTRIN Take 1 Tab by mouth every eight (8) hours as needed for Pain. lisinopril 40 mg tablet Commonly known as:  Mollie Ripa Take 1 Tab by mouth daily. metoprolol tartrate 100 mg IR tablet Commonly known as:  LOPRESSOR Take 1 Tab by mouth two (2) times a day. PEG 3350-Electrolytes 236-22.74-6.74 -5.86 gram suspension Commonly known as:  GO-LYTELY Take as directed  
  
 spironolactone 25 mg tablet Commonly known as:  ALDACTONE Take 1 Tab by mouth daily. topiramate 50 mg tablet Commonly known as:  TOPAMAX Take 1 Tab by mouth two (2) times a day. Prescriptions Sent to Pharmacy Refills  
 cyclobenzaprine (FLEXERIL) 5 mg tablet 0 Sig: Take 1 Tab by mouth nightly as needed for Muscle Spasm(s). Class: Normal  
 Pharmacy: [facility] DR SARA HERNANDEZ [address] [facility], [address] [place] [address].  #: [phone] Route: Oral  
  
Follow-up Instructions Return for as scheduled. To-Do List   
 08/18/2018 11:30 AM  
  Appointment with [facility] MRI RM 1 at [address] [facility] ([phone]) GENERAL INSTRUCTIONS  Bring information (ID card) if you have any medically implanted devices. You will be required to lie still while the procedure is being performed. Remove any jewelry (including body piercing, hairpins) prior to MRI. If you have had a creatinine level drawn within the past 30 days, please bring most recent results to your appt. Bring any films, CD's, and reports related to your study with you on the day of your exam.  This only includes studies done outside of [address] [place] [address], [facility], [facility] , and [place]. Bring a complete list of all medications you are currently taking to include prescriptions, over-the-counter meds, herbals, vitamins & any dietary supplements. If you were given medications for claustrophobia or anxiety, please arrange to have someone drive you to your appointment. QUESTIONS  Notify the MRI Department if you have any questions concerning your study. Claritza Healy [phone] - [phone] [address] - [phone] Patient Instructions A Healthy Lifestyle: Care Instructions Your Care Instructions A healthy lifestyle can help you feel good, stay at a healthy weight, and have plenty of energy for both work and play. A healthy lifestyle is something you can share with your whole family. A healthy lifestyle also can lower your risk for serious health problems, such as high blood pressure, heart disease, and diabetes. You can follow a few steps listed below to improve your health and the health of your family. Follow-up care is a key part of your treatment and safety. Be sure to make and go to all appointments, and call your doctor if you are having problems. It's also a good idea to know your test results and keep a list of the medicines you take. How can you care for yourself at home? · Do not eat too much sugar, fat, or fast foods. You can still have dessert and treats now and then. The goal is moderation. · Start small to improve your eating habits. Pay attention to portion sizes, drink less juice and soda pop, and eat more fruits and vegetables. ¨ Eat a healthy amount of food. A 3-ounce serving of meat, for example, is about the size of a deck of cards. Fill the rest of your plate with vegetables and whole grains. ¨ Limit the amount of soda and sports drinks you have every day. Drink more water when you are thirsty. ¨ Eat at least 5 servings of fruits and vegetables every day. It may seem like a lot, but it is not hard to reach this goal. A serving or helping is 1 piece of fruit, 1 cup of vegetables, or 2 cups of leafy, raw vegetables. Have an apple or some carrot sticks as an afternoon snack instead of a candy bar. Try to have fruits and/or vegetables at every meal. 
· Make exercise part of your daily routine. You may want to start with simple activities, such as walking, bicycling, or slow swimming. Try to be active 30 to 60 minutes every day. You do not need to do all 30 to 60 minutes all at once. For example, you can exercise 3 times a day for 10 or 20 minutes. Moderate exercise is safe for most people, but it is always a good idea to talk to your doctor before starting an exercise program. 
· Keep moving. Tr Goltz the lawn, work in the garden, or KODA. Take the stairs instead of the elevator at work. · If you smoke, quit. People who smoke have an increased risk for heart attack, stroke, cancer, and other lung illnesses. Quitting is hard, but there are ways to boost your chance of quitting tobacco for good. ¨ Use nicotine gum, patches, or lozenges. ¨ Ask your doctor about stop-smoking programs and medicines. ¨ Keep trying.  
In addition to reducing your risk of diseases in the future, you will notice some benefits soon after you stop using tobacco. If you have shortness of breath or asthma symptoms, they will likely get better within a few weeks after you quit. · Limit how much alcohol you drink. Moderate amounts of alcohol (up to 2 drinks a day for men, 1 drink a day for women) are okay. But drinking too much can lead to liver problems, high blood pressure, and other health problems. Family health If you have a family, there are many things you can do together to improve your health. · Eat meals together as a family as often as possible. · Eat healthy foods. This includes fruits, vegetables, lean meats and dairy, and whole grains. · Include your family in your fitness plan. Most people think of activities such as jogging or tennis as the way to fitness, but there are many ways you and your family can be more active. Anything that makes you breathe hard and gets your heart pumping is exercise. Here are some tips: 
¨ Walk to do errands or to take your child to school or the bus. ¨ Go for a family bike ride after dinner instead of watching TV. Where can you learn more? Go to http://colletteCoursmosgrecia.info/. Enter Y868 in the search box to learn more about \"A Healthy Lifestyle: Care Instructions. \" Current as of: December 7, 2017 Content Version: 11.7 © 0327-2919 [employer]. Care instructions adapted under license by [employer] (which disclaims liability or warranty for this information). If you have questions about a medical condition or this instruction, always ask your healthcare professional. [place] [address] any warranty or liability for your use of this information. Introducing [facility]! Dear Kodak Andrew: 
Thank you for requesting a [email or web address] account. Our records indicate that you already have an active [email or web address] account. You can access your account anytime at [email or web address] Did you know that you can access your hospital and ER discharge instructions at any time in CrowdPlat? You can also review all of your test results from your hospital stay or ER visit. Additional Information If you have questions, please visit the Frequently Asked Questions section of the CrowdPlat website at [email or web address] Remember, CrowdPlat is NOT to be used for urgent needs. For medical emergencies, dial 911. Now available from your iPhone and Android! Please provide this summary of care documentation to your next provider. Your primary care clinician is listed as Randye Merlin. If you have any questions after today's visit, please call [phone].

## 2018-08-18 ENCOUNTER — HOSPITAL ENCOUNTER (OUTPATIENT)
Dept: MRI IMAGING | Age: 53
Discharge: HOME | End: 2018-08-18
Attending: PHYSICAL MEDICINE & REHABILITATION
Payer: SELF-PAY

## 2018-08-18 DIAGNOSIS — M54.50 LOW BACK PAIN WITHOUT SCIATICA, UNSPECIFIED BACK PAIN LATERALITY, UNSPECIFIED CHRONICITY: ICD-10-CM

## 2018-08-18 DIAGNOSIS — M47.817 LUMBOSACRAL SPONDYLOSIS WITHOUT MYELOPATHY: ICD-10-CM

## 2018-08-18 DIAGNOSIS — M41.9 SCOLIOSIS OF LUMBAR SPINE, UNSPECIFIED SCOLIOSIS TYPE: ICD-10-CM

## 2018-08-18 DIAGNOSIS — M51.36 DDD (DEGENERATIVE DISC DISEASE), LUMBAR: ICD-10-CM

## 2018-08-18 DIAGNOSIS — M54.16 LUMBAR NEURITIS: ICD-10-CM

## 2018-08-18 PROCEDURE — 72148 MRI LUMBAR SPINE W/O DYE: CPT

## 2018-09-01 DIAGNOSIS — R79.89 ELEVATED TSH: ICD-10-CM

## 2018-09-21 ENCOUNTER — HOSPITAL ENCOUNTER (EMERGENCY)
Age: 53
Discharge: HOME | End: 2018-09-21
Attending: EMERGENCY MEDICINE
Payer: SELF-PAY

## 2018-09-21 VITALS
WEIGHT: 140 LBS | SYSTOLIC BLOOD PRESSURE: 249 MMHG | DIASTOLIC BLOOD PRESSURE: 114 MMHG | TEMPERATURE: 98.3 F | RESPIRATION RATE: 18 BRPM | OXYGEN SATURATION: 100 % | BODY MASS INDEX: 23.9 KG/M2 | HEIGHT: 64 IN | HEART RATE: 60 BPM

## 2018-09-21 DIAGNOSIS — R03.0 ELEVATED BLOOD PRESSURE READING: Primary | ICD-10-CM

## 2018-09-21 DIAGNOSIS — D64.9 LOW HEMOGLOBIN: ICD-10-CM

## 2018-09-21 DIAGNOSIS — R10.9 FLANK PAIN, ACUTE: ICD-10-CM

## 2018-09-21 LAB
ALBUMIN SERPL-MCNC: 3.9 G/DL (ref 3.4–5)
ALBUMIN/GLOB SERPL: 1 {RATIO} (ref 0.8–1.7)
ALP SERPL-CCNC: 76 U/L (ref 45–117)
ALT SERPL-CCNC: 16 U/L (ref 13–56)
ANION GAP SERPL CALC-SCNC: 8 MMOL/L (ref 3–18)
APPEARANCE UR: CLEAR
AST SERPL-CCNC: 15 U/L (ref 15–37)
BASOPHILS # BLD: 0 K/UL (ref 0–0.1)
BASOPHILS NFR BLD: 1 % (ref 0–2)
BILIRUB SERPL-MCNC: 0.5 MG/DL (ref 0.2–1)
BILIRUB UR QL: NEGATIVE
BUN SERPL-MCNC: 8 MG/DL (ref 7–18)
BUN/CREAT SERPL: 13 (ref 12–20)
CALCIUM SERPL-MCNC: 8.4 MG/DL (ref 8.5–10.1)
CHLORIDE SERPL-SCNC: 107 MMOL/L (ref 100–108)
CO2 SERPL-SCNC: 24 MMOL/L (ref 21–32)
COLOR UR: YELLOW
CREAT SERPL-MCNC: 0.61 MG/DL (ref 0.6–1.3)
DIFFERENTIAL METHOD BLD: ABNORMAL
EOSINOPHIL # BLD: 0 K/UL (ref 0–0.4)
EOSINOPHIL NFR BLD: 1 % (ref 0–5)
ERYTHROCYTE [DISTWIDTH] IN BLOOD BY AUTOMATED COUNT: 17.9 % (ref 11.6–14.5)
GLOBULIN SER CALC-MCNC: 3.8 G/DL (ref 2–4)
GLUCOSE SERPL-MCNC: 94 MG/DL (ref 74–99)
GLUCOSE UR STRIP.AUTO-MCNC: NEGATIVE MG/DL
HCT VFR BLD AUTO: 29.2 % (ref 35–45)
HGB BLD-MCNC: 8.7 G/DL (ref 12–16)
HGB UR QL STRIP: NEGATIVE
KETONES UR QL STRIP.AUTO: NEGATIVE MG/DL
LEUKOCYTE ESTERASE UR QL STRIP.AUTO: NEGATIVE
LYMPHOCYTES # BLD: 0.9 K/UL (ref 0.9–3.6)
LYMPHOCYTES NFR BLD: 13 % (ref 21–52)
MCH RBC QN AUTO: 20.6 PG (ref 24–34)
MCHC RBC AUTO-ENTMCNC: 29.8 G/DL (ref 31–37)
MCV RBC AUTO: 69.2 FL (ref 74–97)
MONOCYTES # BLD: 0.5 K/UL (ref 0.05–1.2)
MONOCYTES NFR BLD: 7 % (ref 3–10)
NEUTS SEG # BLD: 5.1 K/UL (ref 1.8–8)
NEUTS SEG NFR BLD: 78 % (ref 40–73)
NITRITE UR QL STRIP.AUTO: NEGATIVE
PH UR STRIP: 7 [PH] (ref 5–8)
PLATELET # BLD AUTO: 284 K/UL (ref 135–420)
PMV BLD AUTO: 9.2 FL (ref 9.2–11.8)
POTASSIUM SERPL-SCNC: 3.7 MMOL/L (ref 3.5–5.5)
PROT SERPL-MCNC: 7.7 G/DL (ref 6.4–8.2)
PROT UR STRIP-MCNC: NEGATIVE MG/DL
RBC # BLD AUTO: 4.22 M/UL (ref 4.2–5.3)
SODIUM SERPL-SCNC: 139 MMOL/L (ref 136–145)
SP GR UR REFRACTOMETRY: 1.01 (ref 1–1.03)
UROBILINOGEN UR QL STRIP.AUTO: 0.2 EU/DL (ref 0.2–1)
WBC # BLD AUTO: 6.5 K/UL (ref 4.6–13.2)

## 2018-09-21 PROCEDURE — 96375 TX/PRO/DX INJ NEW DRUG ADDON: CPT

## 2018-09-21 PROCEDURE — 80053 COMPREHEN METABOLIC PANEL: CPT | Performed by: EMERGENCY MEDICINE

## 2018-09-21 PROCEDURE — 99282 EMERGENCY DEPT VISIT SF MDM: CPT

## 2018-09-21 PROCEDURE — 74011250636 HC RX REV CODE- 250/636: Performed by: EMERGENCY MEDICINE

## 2018-09-21 PROCEDURE — 96374 THER/PROPH/DIAG INJ IV PUSH: CPT

## 2018-09-21 PROCEDURE — 96361 HYDRATE IV INFUSION ADD-ON: CPT

## 2018-09-21 PROCEDURE — 85025 COMPLETE CBC W/AUTO DIFF WBC: CPT | Performed by: EMERGENCY MEDICINE

## 2018-09-21 PROCEDURE — 81003 URINALYSIS AUTO W/O SCOPE: CPT | Performed by: EMERGENCY MEDICINE

## 2018-09-21 RX ORDER — ONDANSETRON 2 MG/ML
4 INJECTION INTRAMUSCULAR; INTRAVENOUS
Status: COMPLETED | OUTPATIENT
Start: 2018-09-21 | End: 2018-09-21

## 2018-09-21 RX ORDER — KETOROLAC TROMETHAMINE 30 MG/ML
30 INJECTION, SOLUTION INTRAMUSCULAR; INTRAVENOUS
Status: COMPLETED | OUTPATIENT
Start: 2018-09-21 | End: 2018-09-21

## 2018-09-21 RX ORDER — IBUPROFEN 800 MG/1
800 TABLET ORAL
Qty: 20 TAB | Refills: 0 | Status: SHIPPED | OUTPATIENT
Start: 2018-09-21 | End: 2018-09-28

## 2018-09-21 RX ORDER — DIAZEPAM 5 MG/1
5 TABLET ORAL
Qty: 20 TAB | Refills: 0 | Status: SHIPPED | OUTPATIENT
Start: 2018-09-21 | End: 2019-06-10 | Stop reason: ALTCHOICE

## 2018-09-21 RX ORDER — FERROUS SULFATE 325(65) MG
325 TABLET, DELAYED RELEASE (ENTERIC COATED) ORAL
Qty: 30 TAB | Refills: 0 | Status: SHIPPED | OUTPATIENT
Start: 2018-09-21 | End: 2022-09-28 | Stop reason: SDUPTHER

## 2018-09-21 RX ADMIN — KETOROLAC TROMETHAMINE 30 MG: 30 INJECTION, SOLUTION INTRAMUSCULAR at 10:32

## 2018-09-21 RX ADMIN — SODIUM CHLORIDE 1000 ML: 900 INJECTION, SOLUTION INTRAVENOUS at 10:31

## 2018-09-21 RX ADMIN — ONDANSETRON 4 MG: 2 INJECTION INTRAMUSCULAR; INTRAVENOUS at 10:32

## 2018-09-21 NOTE — DISCHARGE INSTRUCTIONS
Anemia: Care Instructions  Your Care Instructions    Anemia is a low level of red blood cells, which carry oxygen throughout your body. Many things can cause anemia. Lack of iron is one of the most common causes. Your body needs iron to make hemoglobin, a substance in red blood cells that carries oxygen from the lungs to your body's cells. Without enough iron, the body produces fewer and smaller red blood cells. As a result, your body's cells do not get enough oxygen, and you feel tired and weak. And you may have trouble concentrating. Bleeding is the most common cause of a lack of iron. You may have heavy menstrual bleeding or bleeding caused by conditions such as ulcers, hemorrhoids, or cancer. Regular use of aspirin or other anti-inflammatory medicines (such as ibuprofen) also can cause bleeding in some people. A lack of iron in your diet also can cause anemia, especially at times when the body needs more iron, such as during pregnancy, infancy, and the teen years. Your doctor may have prescribed iron pills. It may take several months of treatment for your iron levels to return to normal. Your doctor also may suggest that you eat foods that are rich in iron, such as meat and beans. There are many other causes of anemia. It is not always due to a lack of iron. Finding the specific cause of your anemia will help your doctor find the right treatment for you. Follow-up care is a key part of your treatment and safety. Be sure to make and go to all appointments, and call your doctor if you are having problems. It's also a good idea to know your test results and keep a list of the medicines you take. How can you care for yourself at home? · Take your medicines exactly as prescribed. Call your doctor if you think you are having a problem with your medicine. · If your doctor recommends iron pills, take them as directed:  ¨ Try to take the pills on an empty stomach about 1 hour before or 2 hours after meals. But you may need to take iron with food to avoid an upset stomach. ¨ Do not take antacids or drink milk or caffeine drinks (such as coffee, tea, or cola) at the same time or within 2 hours of the time that you take your iron. They can make it hard for your body to absorb the iron. ¨ Vitamin C (from food or supplements) helps your body absorb iron. Try taking iron pills with a glass of orange juice or some other food that is high in vitamin C, such as citrus fruits. ¨ Iron pills may cause stomach problems, such as heartburn, nausea, diarrhea, constipation, and cramps. Be sure to drink plenty of fluids, and include fruits, vegetables, and fiber in your diet each day. Iron pills often make your bowel movements dark or green. ¨ If you forget to take an iron pill, do not take a double dose of iron the next time you take a pill. ¨ Keep iron pills out of the reach of small children. An overdose of iron can be very dangerous. · Follow your doctor's advice about eating iron-rich foods. These include red meat, shellfish, poultry, eggs, beans, raisins, whole-grain bread, and leafy green vegetables. · Steam vegetables to help them keep their iron content. When should you call for help? Call 911 anytime you think you may need emergency care. For example, call if:    · You have symptoms of a heart attack. These may include:  ¨ Chest pain or pressure, or a strange feeling in the chest.  ¨ Sweating. ¨ Shortness of breath. ¨ Nausea or vomiting. ¨ Pain, pressure, or a strange feeling in the back, neck, jaw, or upper belly or in one or both shoulders or arms. ¨ Lightheadedness or sudden weakness. ¨ A fast or irregular heartbeat. After you call 911, the  may tell you to chew 1 adult-strength or 2 to 4 low-dose aspirin. Wait for an ambulance.  Do not try to drive yourself.     · You passed out (lost consciousness).    Call your doctor now or seek immediate medical care if:    · You have new or increased shortness of breath.     · You are dizzy or lightheaded, or you feel like you may faint.     · Your fatigue and weakness continue or get worse.     · You have any abnormal bleeding, such as:  ¨ Nosebleeds. ¨ Vaginal bleeding that is different (heavier, more frequent, at a different time of the month) than what you are used to. ¨ Bloody or black stools, or rectal bleeding. ¨ Bloody or pink urine.    Watch closely for changes in your health, and be sure to contact your doctor if:    · You do not get better as expected. Where can you learn more? Go to http://collette-grecia.info/. Enter R301 in the search box to learn more about \"Anemia: Care Instructions. \"  Current as of: October 9, 2017  Content Version: 11.7  © 5716-2111 [employer]. Care instructions adapted under license by [employer] (which disclaims liability or warranty for this information). If you have questions about a medical condition or this instruction, always ask your healthcare professional. [place] [address] any warranty or liability for your use of this information. Elevated Blood Pressure: Care Instructions  Your Care Instructions    Blood pressure is a measure of how hard the blood pushes against the walls of your arteries. It's normal for blood pressure to go up and down throughout the day. But if it stays up over time, you have high blood pressure. Two numbers tell you your blood pressure. The first number is the systolic pressure. It shows how hard the blood pushes when your heart is pumping. The second number is the diastolic pressure. It shows how hard the blood pushes between heartbeats, when your heart is relaxed and filling with blood. An ideal blood pressure in adults is less than 120/80 (say \"120 over 80\"). High blood pressure is 140/90 or higher. You have high blood pressure if your top number is 140 or higher or your bottom number is 90 or higher, or both.   The main test for high blood pressure is simple, fast, and painless. To diagnose high blood pressure, your doctor will test your blood pressure at different times. After testing your blood pressure, your doctor may ask you to test it again when you are home. If you are diagnosed with high blood pressure, you can work with your doctor to make a long-term plan to manage it. Follow-up care is a key part of your treatment and safety. Be sure to make and go to all appointments, and call your doctor if you are having problems. It's also a good idea to know your test results and keep a list of the medicines you take. How can you care for yourself at home? · Do not smoke. Smoking increases your risk for heart attack and stroke. If you need help quitting, talk to your doctor about stop-smoking programs and medicines. These can increase your chances of quitting for good. · Stay at a healthy weight. · Try to limit how much sodium you eat to less than 2,300 milligrams (mg) a day. Your doctor may ask you to try to eat less than 1,500 mg a day. · Be physically active. Get at least 30 minutes of exercise on most days of the week. Walking is a good choice. You also may want to do other activities, such as running, swimming, cycling, or playing tennis or team sports. · Avoid or limit alcohol. Talk to your doctor about whether you can drink any alcohol. · Eat plenty of fruits, vegetables, and low-fat dairy products. Eat less saturated and total fats. · Learn how to check your blood pressure at home. When should you call for help? Call your doctor now or seek immediate medical care if:  ? · Your blood pressure is much higher than normal (such as 180/110 or higher). ? · You think high blood pressure is causing symptoms such as:  ¨ Severe headache. ¨ Blurry vision. ? Watch closely for changes in your health, and be sure to contact your doctor if:  ? · You do not get better as expected. Where can you learn more?   Go to http://collette-grecia.info/. Enter D291 in the search box to learn more about \"Elevated Blood Pressure: Care Instructions. \"  Current as of: September 21, 2016  Content Version: 11.4  © 8343-9278 Healthwise, [facility]. Care instructions adapted under license by [employer] (which disclaims liability or warranty for this information). If you have questions about a medical condition or this instruction, always ask your healthcare professional. [place] [address] any warranty or liability for your use of this information.

## 2018-09-21 NOTE — ED TRIAGE NOTES
Pt c/o left lower back pain (\"i think its my kidney. \"), nausea (reports not taking bp pill this morning because of the nausea but, has it with her.) and urinary pain, all since yesterday.

## 2018-09-21 NOTE — ED PROVIDER NOTES
EMERGENCY DEPARTMENT HISTORY AND PHYSICAL EXAM    9:32 AM      Date: 9/21/2018  Patient Name: Margret Phalen    History of Presenting Illness     Chief Complaint   Patient presents with    Back Pain         History Provided By: Patient    Chief Complaint: back pain  Duration:  yesterday  Timing:  Worsening  Location: lower left side  Quality: Sharp  Severity: 10 out of 10  Associated Symptoms: dysuria, fever, SOB, diaphoresis, and vomiting. Denies cough. Additional History (Context): Margret Phalen is a 48 y.o. female with hypertension and stroke who presents with worsening sharp 10/10 lower left sided back pain that started yesterday. Associated sx are dysuria, fever, SOB, diaphoresis, and vomiting. Denies cough. Pt is a former smoker and does not drink. PCP: AHSAN Campos    Current Facility-Administered Medications   Medication Dose Route Frequency Provider Last Rate Last Dose    sodium chloride 0.9 % bolus infusion 1,000 mL  1,000 mL IntraVENous ONCE Suresh Humphries MD 1,000 mL/hr at 09/21/18 1031 1,000 mL at 09/21/18 1031     Current Outpatient Prescriptions   Medication Sig Dispense Refill    cyclobenzaprine (FLEXERIL) 5 mg tablet Take 1 Tab by mouth nightly as needed for Muscle Spasm(s). 30 Tab 0    ibuprofen (MOTRIN) 800 mg tablet Take 1 Tab by mouth every eight (8) hours as needed for Pain. 90 Tab 0    spironolactone (ALDACTONE) 25 mg tablet Take 1 Tab by mouth daily. 90 Tab 0    hydrALAZINE (APRESOLINE) 100 mg tablet Take 1 Tab by mouth three (3) times daily. 90 Tab 0    amLODIPine (NORVASC) 10 mg tablet Take 1 Tab by mouth daily. Indications: hypertension 90 Tab 1    lisinopril (PRINIVIL, ZESTRIL) 40 mg tablet Take 1 Tab by mouth daily. 90 Tab 1    metoprolol tartrate (LOPRESSOR) 100 mg IR tablet Take 1 Tab by mouth two (2) times a day. 180 Tab 1    hydroCHLOROthiazide (HYDRODIURIL) 25 mg tablet Take 1 Tab by mouth daily.  30 Tab 3    topiramate (TOPAMAX) 50 mg tablet Take 1 Tab by mouth two (2) times a day. 61 Tab 3       Past History     Past Medical History:  Past Medical History:   Diagnosis Date    Abnormal mammogram 3/2016    BIRADS 3 right breast    Hypertension     Stroke ([address] 2016    CVA       Past Surgical History:  Past Surgical History:   Procedure Laterality Date    HX GYN  1982            Family History:  Family History   Problem Relation Age of Onset    No Known Problems Mother     No Known Problems Father     No Known Problems Sister     No Known Problems Brother     No Known Problems Maternal Aunt     No Known Problems Maternal Uncle     No Known Problems Paternal Aunt     No Known Problems Paternal Uncle     No Known Problems Maternal Grandmother     No Known Problems Paternal Grandmother     No Known Problems Paternal Grandfather     No Known Problems Other        Social History:  Social History   Substance Use Topics    Smoking status: Former Smoker     Quit date: 1999    Smokeless tobacco: Never Used    Alcohol use No       Allergies:  No Known Allergies      Review of Systems       Review of Systems   Constitutional: Positive for diaphoresis and fever. Respiratory: Positive for shortness of breath. Negative for cough. Gastrointestinal: Positive for vomiting. Genitourinary: Positive for dysuria. Musculoskeletal: Positive for back pain. All other systems reviewed and are negative. Physical Exam     Visit Vitals    BP (!) 232/134 (BP 1 Location: Right arm, BP Patient Position: At rest)    Pulse 60    Temp 98.3 °F (36.8 °C)    Resp 18    Ht 5' 4\" (1.626 m)    Wt 63.5 kg (140 lb)    SpO2 100%    Breastfeeding No    BMI 24.03 kg/m2         Physical Exam   Constitutional: She is oriented to person, place, and time. She appears well-developed and well-nourished. No distress. Appears uncomfortable. Does not want to move   HENT:   Head: Normocephalic and atraumatic.    Mouth/Throat: Oropharynx is clear and moist.   Eyes: Conjunctivae and EOM are normal. Pupils are equal, round, and reactive to light. No scleral icterus. Neck: Normal range of motion. Neck supple. Cardiovascular: Normal rate, regular rhythm and normal heart sounds. No murmur heard. Pulmonary/Chest: Effort normal and breath sounds normal. No respiratory distress. Abdominal: Soft. Bowel sounds are normal. She exhibits no distension. There is no tenderness. Musculoskeletal: She exhibits no edema. Bilateral CVA tenderness with left greater than right  Suprapubic tenderness   Lymphadenopathy:     She has no cervical adenopathy. Neurological: She is alert and oriented to person, place, and time. Coordination normal.   Skin: Skin is warm and dry. No rash noted. Psychiatric: She has a normal mood and affect. Her behavior is normal.   Nursing note and vitals reviewed. Diagnostic Study Results     Labs -  Recent Results (from the past 12 hour(s))   URINALYSIS W/ RFLX MICROSCOPIC    Collection Time: 09/21/18  9:19 AM   Result Value Ref Range    Color YELLOW      Appearance CLEAR      Specific gravity 1.008 1.005 - 1.030      pH (UA) 7.0 5.0 - 8.0      Protein NEGATIVE  NEG mg/dL    Glucose NEGATIVE  NEG mg/dL    Ketone NEGATIVE  NEG mg/dL    Bilirubin NEGATIVE  NEG      Blood NEGATIVE  NEG      Urobilinogen 0.2 0.2 - 1.0 EU/dL    Nitrites NEGATIVE  NEG      Leukocyte Esterase NEGATIVE  NEG     CBC WITH AUTOMATED DIFF    Collection Time: 09/21/18  9:47 AM   Result Value Ref Range    WBC 6.5 4.6 - 13.2 K/uL    RBC 4.22 4.20 - 5.30 M/uL    HGB 8.7 (L) 12.0 - 16.0 g/dL    HCT 29.2 (L) 35.0 - 45.0 %    MCV 69.2 (L) 74.0 - 97.0 FL    MCH 20.6 (L) 24.0 - 34.0 PG    MCHC 29.8 (L) 31.0 - 37.0 g/dL    RDW 17.9 (H) 11.6 - 14.5 %    PLATELET 123 049 - 045 K/uL    MPV 9.2 9.2 - 11.8 FL    NEUTROPHILS 78 (H) 40 - 73 %    LYMPHOCYTES 13 (L) 21 - 52 %    MONOCYTES 7 3 - 10 %    EOSINOPHILS 1 0 - 5 %    BASOPHILS 1 0 - 2 %    ABS.  NEUTROPHILS 5.1 1.8 - 8.0 K/UL    ABS. LYMPHOCYTES 0.9 0.9 - 3.6 K/UL    ABS. MONOCYTES 0.5 0.05 - 1.2 K/UL    ABS. EOSINOPHILS 0.0 0.0 - 0.4 K/UL    ABS. BASOPHILS 0.0 0.0 - 0.1 K/UL    DF AUTOMATED     METABOLIC PANEL, COMPREHENSIVE    Collection Time: 09/21/18  9:47 AM   Result Value Ref Range    Sodium 139 136 - 145 mmol/L    Potassium 3.7 3.5 - 5.5 mmol/L    Chloride 107 100 - 108 mmol/L    CO2 24 21 - 32 mmol/L    Anion gap 8 3.0 - 18 mmol/L    Glucose 94 74 - 99 mg/dL    BUN 8 7.0 - 18 MG/DL    Creatinine 0.61 0.6 - 1.3 MG/DL    BUN/Creatinine ratio 13 12 - 20      GFR est AA >60 >60 ml/min/1.73m2    GFR est non-AA >60 >60 ml/min/1.73m2    Calcium 8.4 (L) 8.5 - 10.1 MG/DL    Bilirubin, total 0.5 0.2 - 1.0 MG/DL    ALT (SGPT) 16 13 - 56 U/L    AST (SGOT) 15 15 - 37 U/L    Alk. phosphatase 76 45 - 117 U/L    Protein, total 7.7 6.4 - 8.2 g/dL    Albumin 3.9 3.4 - 5.0 g/dL    Globulin 3.8 2.0 - 4.0 g/dL    A-G Ratio 1.0 0.8 - 1.7         Radiologic Studies -   No orders to display         Medical Decision Making   I am the first provider for this patient. I reviewed the vital signs, available nursing notes, past medical history, past surgical history, family history and social history. Vital Signs-Reviewed the patient's vital signs. Records Reviewed: Nursing Notes (Time of Review: 9:32 AM)    ED Course: Progress Notes, Reevaluation, and Consults:  11:24 AM Upon reevaluation, Pt looks improved. Provider Notes (Medical Decision Making):   MDM  Number of Diagnoses or Management Options  Elevated blood pressure reading:   Flank pain, acute:   Low hemoglobin:   Diagnosis management comments: Appears uncomfortable nontoxic with CVA tenderness and fevers dysuria        Amount and/or Complexity of Data Reviewed  Clinical lab tests: ordered and reviewed                Diagnosis     Clinical Impression:   1. Elevated blood pressure reading    2. Low hemoglobin    3.  Flank pain, acute        Disposition: home     Follow-up Information Follow up With Details Comments Contact [facility] EMERGENCY DEPT  As needed, If symptoms worsen [address] [place] [address]    AHSAN Rosales Schedule an appointment as soon as possible for a visit for ED follow up appointment  [address] [facility] [address]  [phone]             Patient's Medications   Start Taking    No medications on file   Continue Taking    AMLODIPINE (NORVASC) 10 MG TABLET    Take 1 Tab by mouth daily. Indications: hypertension    CYCLOBENZAPRINE (FLEXERIL) 5 MG TABLET    Take 1 Tab by mouth nightly as needed for Muscle Spasm(s). HYDRALAZINE (APRESOLINE) 100 MG TABLET    Take 1 Tab by mouth three (3) times daily. HYDROCHLOROTHIAZIDE (HYDRODIURIL) 25 MG TABLET    Take 1 Tab by mouth daily. IBUPROFEN (MOTRIN) 800 MG TABLET    Take 1 Tab by mouth every eight (8) hours as needed for Pain. LISINOPRIL (PRINIVIL, ZESTRIL) 40 MG TABLET    Take 1 Tab by mouth daily. METOPROLOL TARTRATE (LOPRESSOR) 100 MG IR TABLET    Take 1 Tab by mouth two (2) times a day. SPIRONOLACTONE (ALDACTONE) 25 MG TABLET    Take 1 Tab by mouth daily. TOPIRAMATE (TOPAMAX) 50 MG TABLET    Take 1 Tab by mouth two (2) times a day. These Medications have changed    No medications on file   Stop Taking    PEG 3350-ELECTROLYTES (GO-LYTELY) 236-22.74-6.74 -5.86 GRAM SUSPENSION    Take as directed     _______________________________    Attestations:  [address] acting as a scribe for and in the presence of Baylee Michelle MD      September 21, 2018 at 9:32 AM       Provider Attestation:      I personally performed the services described in the documentation, reviewed the documentation, as recorded by the scribe in my presence, and it accurately and completely records my words and actions.  September 21, 2018 at 9:32 AM - Baylee Michelle MD    _______________________________

## 2018-09-21 NOTE — LETTER
[facility] EMERGENCY DEPT 
[facility] [id number] 
[phone] Work Note Date: 9/21/2018 To Whom It May concern: 
 
Amelia Peralta was seen and treated today in the emergency room by the following provider(s): 
Attending Provider: Augusta Chandra MD.   
 
Amelia Peralta may return to work on 9/23/2018. Sincerely, Augusta Chandra MD.

## 2018-09-26 ENCOUNTER — OFFICE VISIT (OUTPATIENT)
Dept: FAMILY MEDICINE CLINIC | Age: 53
End: 2018-09-26

## 2018-09-26 VITALS
BODY MASS INDEX: 23.15 KG/M2 | HEART RATE: 65 BPM | RESPIRATION RATE: 12 BRPM | HEIGHT: 64 IN | DIASTOLIC BLOOD PRESSURE: 120 MMHG | OXYGEN SATURATION: 99 % | WEIGHT: 135.6 LBS | TEMPERATURE: 98.9 F | SYSTOLIC BLOOD PRESSURE: 170 MMHG

## 2018-09-26 DIAGNOSIS — I10 UNCONTROLLED HYPERTENSION: ICD-10-CM

## 2018-09-26 DIAGNOSIS — R10.9 ACUTE LEFT FLANK PAIN: Primary | ICD-10-CM

## 2018-09-26 DIAGNOSIS — F41.9 ANXIETY: ICD-10-CM

## 2018-09-26 RX ORDER — METOPROLOL TARTRATE 100 MG/1
100 TABLET ORAL 2 TIMES DAILY
Qty: 180 TAB | Refills: 0 | Status: SHIPPED | OUTPATIENT
Start: 2018-09-26 | End: 2019-05-30 | Stop reason: SDUPTHER

## 2018-09-26 RX ORDER — LISINOPRIL 40 MG/1
40 TABLET ORAL DAILY
Qty: 90 TAB | Refills: 0 | Status: SHIPPED | OUTPATIENT
Start: 2018-09-26 | End: 2019-05-30 | Stop reason: SDUPTHER

## 2018-09-26 RX ORDER — AMLODIPINE BESYLATE 10 MG/1
10 TABLET ORAL DAILY
Qty: 90 TAB | Refills: 0 | Status: SHIPPED | OUTPATIENT
Start: 2018-09-26 | End: 2019-05-30 | Stop reason: SDUPTHER

## 2018-09-26 RX ORDER — HYDROCHLOROTHIAZIDE 25 MG/1
25 TABLET ORAL DAILY
Qty: 90 TAB | Refills: 0 | Status: SHIPPED | OUTPATIENT
Start: 2018-09-26 | End: 2018-10-30 | Stop reason: ALTCHOICE

## 2018-09-26 NOTE — PATIENT INSTRUCTIONS

## 2018-09-26 NOTE — PROGRESS NOTES
Chief Complaint   Patient presents with    Elevated Blood Pressure    Back Pain     Patient is not fasting. Patient in room # 6.     1. Have you been to the ER, urgent care clinic since your last visit? Hospitalized since your last visit? Yes When: 09/21/2018 Where: [address] ER Reason for visit: Back pain    2. Have you seen or consulted any other health care providers outside of the [address] since your last visit? Include any pap smears or colon screening. No     Reviewed.  Flu on hold

## 2018-09-26 NOTE — MR AVS SNAPSHOT
[facility] [facility] [address] [facility] 
[phone] Patient: Joann Both MRN: [id number] :1965 Visit Information Date & Time Provider Department Dept. Phone Encounter #  
 2018  5:30 PM Dania Azul, [address] [id number] Follow-up Instructions Return in about 2 weeks (around 10/10/2018) for HTN. Your Appointments 10/18/2018 10:50 AM  
DIAG TEST F/U with Lulu Peters MD  
[facility] Appt Note: mri fu; MRI F/U \"BRING DISC\" r/s from   
 [address] [facility] [address]  
[phone]  
  
   
 [address] [facility] [address] [address] [facility] [address] Upcoming Health Maintenance Date Due DTaP/Tdap/Td series (1 - Tdap) 1986 Shingrix Vaccine Age 50> (1 of 2) 2015 FOBT Q 1 YEAR AGE 50-75 3/2/2017 Influenza Age 5 to Adult 2018 BREAST CANCER SCRN MAMMOGRAM 2018 PAP AKA CERVICAL CYTOLOGY 2020 Allergies as of 2018  Review Complete On: 2018 By: AHSAN Guillory No Known Allergies Current Immunizations  Reviewed on 2016 No immunizations on file. Not reviewed this visit You Were Diagnosed With   
  
 Codes Comments Acute left flank pain    -  Primary ICD-10-CM: R10.9 ICD-9-CM: 789.09, 338.19 Uncontrolled hypertension     ICD-10-CM: I10 
ICD-9-CM: 401.9 Anxiety     ICD-10-CM: F41.9 ICD-9-CM: 300.00 Vitals BP Pulse Temp Resp Height(growth percentile) Weight(growth percentile) (!) 170/120 (BP 1 Location: Right arm, BP Patient Position: Sitting) 65 98.9 °F (37.2 °C) (Oral) 12 5' 4\" (1.626 m) 135 lb 9.6 oz (61.5 kg) LMP SpO2 BMI OB Status Smoking Status 2018 99% 23.28 kg/m2 Having regular periods Former Smoker Vitals History BMI and BSA Data Body Mass Index Body Surface Area  
 23.28 kg/m 2 1.67 m 2 Preferred Pharmacy Pharmacy Name Phone [facility] [address] [place] [address] [address] [phone] Your Updated Medication List  
  
   
This list is accurate as of 9/26/18  6:26 PM.  Always use your most recent med list. amLODIPine 10 mg tablet Commonly known as:  [place] Take 1 Tab by mouth daily. Indications: hypertension  
  
 cyclobenzaprine 5 mg tablet Commonly known as:  FLEXERIL Take 1 Tab by mouth nightly as needed for Muscle Spasm(s). diazePAM 5 mg tablet Commonly known as:  VALIUM Take 1 Tab by mouth every eight (8) hours as needed (muscle spasm). Max Daily Amount: 15 mg.  
  
 ferrous sulfate 325 mg (65 mg iron) EC tablet Commonly known as:  IRON Take 1 Tab by mouth three (3) times daily (with meals). hydrALAZINE 100 mg tablet Commonly known as:  APRESOLINE Take 1 Tab by mouth three (3) times daily. hydroCHLOROthiazide 25 mg tablet Commonly known as:  HYDRODIURIL Take 1 Tab by mouth daily. ibuprofen 800 mg tablet Commonly known as:  MOTRIN Take 1 Tab by mouth every six (6) hours as needed for Pain for up to 7 days. lisinopril 40 mg tablet Commonly known as:  [place] Take 1 Tab by mouth daily. metoprolol tartrate 100 mg IR tablet Commonly known as:  LOPRESSOR Take 1 Tab by mouth two (2) times a day. spironolactone 25 mg tablet Commonly known as:  ALDACTONE Take 1 Tab by mouth daily. topiramate 50 mg tablet Commonly known as:  TOPAMAX Take 1 Tab by mouth two (2) times a day. Prescriptions Sent to Pharmacy Refills  
 lisinopril (PRINIVIL, ZESTRIL) 40 mg tablet 0 Sig: Take 1 Tab by mouth daily. Class: Normal  
 Pharmacy: [facility] [address] S[address] HERNANDEZ [address] [facility], [address] [place] [address].  #: [phone]  Route: Oral  
 amLODIPine (NORVASC) 10 mg tablet 0  
 Sig: Take 1 Tab by mouth daily. Indications: hypertension Class: Normal  
 Pharmacy: [address] [place] [address], [address] [place] [address]. Ph #: [phone] Route: Oral  
 metoprolol tartrate (LOPRESSOR) 100 mg IR tablet 0 Sig: Take 1 Tab by mouth two (2) times a day. Class: Normal  
 Pharmacy: [address] [place] [address], [address] [place] [address]. Ph #: [phone] Route: Oral  
 hydroCHLOROthiazide (HYDRODIURIL) 25 mg tablet 0 Sig: Take 1 Tab by mouth daily. Class: Normal  
 Pharmacy: [address] [place] [address], [address] [place] [address]. Ph #: [phone] Route: Oral  
  
We Performed the Following REFERRAL TO CARDIOLOGY [AFC98 Custom] Follow-up Instructions Return in about 2 weeks (around 10/10/2018) for HTN. To-Do List   
 09/26/2018 Vascular/US:  DUPLEX RENAL ART/COREY BILATERAL Referral Information Referral ID Referred By Referred To  
  
 [id number] Kyleigh JUANuri [address], DO   
   [address] [place], [address] Phone: [phone] Fax: [phone] Visits Status Start Date End Date 1 New Request 9/26/18 9/26/19 If your referral has a status of pending review or denied, additional information will be sent to support the outcome of this decision. Patient Instructions High Blood Pressure: Care Instructions Your Care Instructions If your blood pressure is usually above 130/80, you have high blood pressure, or hypertension. That means the top number is 130 or higher or the bottom number is 80 or higher, or both. Despite what a lot of people think, high blood pressure usually doesn't cause headaches or make you feel dizzy or lightheaded. It usually has no symptoms. But it does increase your risk for heart attack, stroke, and kidney or eye damage. The higher your blood pressure, the more your risk increases. Your doctor will give you a goal for your blood pressure. Your goal will be based on your health and your age. Lifestyle changes, such as eating healthy and being active, are always important to help lower blood pressure. You might also take medicine to reach your blood pressure goal. 
Follow-up care is a key part of your treatment and safety. Be sure to make and go to all appointments, and call your doctor if you are having problems. It's also a good idea to know your test results and keep a list of the medicines you take. How can you care for yourself at home? Medical treatment · If you stop taking your medicine, your blood pressure will go back up. You may take one or more types of medicine to lower your blood pressure. Be safe with medicines. Take your medicine exactly as prescribed. Call your doctor if you think you are having a problem with your medicine. · Talk to your doctor before you start taking aspirin every day. Aspirin can help certain people lower their risk of a heart attack or stroke. But taking aspirin isn't right for everyone, because it can cause serious bleeding. · See your doctor regularly. You may need to see the doctor more often at first or until your blood pressure comes down. · If you are taking blood pressure medicine, talk to your doctor before you take decongestants or anti-inflammatory medicine, such as ibuprofen. Some of these medicines can raise blood pressure. · Learn how to check your blood pressure at home. Lifestyle changes · Stay at a healthy weight. This is especially important if you put on weight around the waist. Losing even 10 pounds can help you lower your blood pressure. · If your doctor recommends it, get more exercise. Walking is a good choice. Bit by bit, increase the amount you walk every day. Try for at least 30 minutes on most days of the week. You also may want to swim, bike, or do other activities. · Avoid or limit alcohol. Talk to your doctor about whether you can drink any alcohol. · Try to limit how much sodium you eat to less than 2,300 milligrams (mg) a day. Your doctor may ask you to try to eat less than 1,500 mg a day. · Eat plenty of fruits (such as bananas and oranges), vegetables, legumes, whole grains, and low-fat dairy products. · Lower the amount of saturated fat in your diet. Saturated fat is found in animal products such as milk, cheese, and meat. Limiting these foods may help you lose weight and also lower your risk for heart disease. · Do not smoke. Smoking increases your risk for heart attack and stroke. If you need help quitting, talk to your doctor about stop-smoking programs and medicines. These can increase your chances of quitting for good. When should you call for help? Call 911 anytime you think you may need emergency care. This may mean having symptoms that suggest that your blood pressure is causing a serious heart or blood vessel problem. Your blood pressure may be over 180/110. 
 For example, call 911 if: 
  · You have symptoms of a heart attack. These may include: ¨ Chest pain or pressure, or a strange feeling in the chest. 
¨ Sweating. ¨ Shortness of breath. ¨ Nausea or vomiting. ¨ Pain, pressure, or a strange feeling in the back, neck, jaw, or upper belly or in one or both shoulders or arms. ¨ Lightheadedness or sudden weakness. ¨ A fast or irregular heartbeat.  
  · You have symptoms of a stroke. These may include: 
¨ Sudden numbness, tingling, weakness, or loss of movement in your face, arm, or leg, especially on only one side of your body. ¨ Sudden vision changes. ¨ Sudden trouble speaking. ¨ Sudden confusion or trouble understanding simple statements. ¨ Sudden problems with walking or balance. ¨ A sudden, severe headache that is different from past headaches.  
  · You have severe back or belly pain.  Do not wait until your blood pressure comes down on its own. Get help right away. 
 Call your doctor now or seek immediate care if: 
  · Your blood pressure is much higher than normal (such as 180/110 or higher), but you don't have symptoms.  
  · You think high blood pressure is causing symptoms, such as: ¨ Severe headache. ¨ Blurry vision.  
 Watch closely for changes in your health, and be sure to contact your doctor if: 
  · Your blood pressure measures 140/90 or higher at least 2 times. That means the top number is 140 or higher or the bottom number is 90 or higher, or both.  
  · You think you may be having side effects from your blood pressure medicine.  
  · Your blood pressure is usually normal, but it goes above normal at least 2 times. Where can you learn more? Go to http://collette-grecia.info/. Enter P726 in the search box to learn more about \"High Blood Pressure: Care Instructions. \" Current as of: December 6, 2017 Content Version: 11.7 © 9634-4671 [employer]. Care instructions adapted under license by [employer] (which disclaims liability or warranty for this information). If you have questions about a medical condition or this instruction, always ask your healthcare professional. [place] [address] any warranty or liability for your use of this information. Introducing [facility]! Dear Walt Fox: 
Thank you for requesting a [employer] account. Our records indicate that you already have an active [employer] account. You can access your account anytime at [email or web address]. Keen Impressions/[employer] Did you know that you can access your hospital and ER discharge instructions at any time in [employer]? You can also review all of your test results from your hospital stay or ER visit. Additional Information If you have questions, please visit the Frequently Asked Questions section of the bookjam website at [email or web address] Remember, bookjam is NOT to be used for urgent needs. For medical emergencies, dial 911. Now available from your iPhone and Android! Please provide this summary of care documentation to your next provider. Your primary care clinician is listed as Nory Rothman. If you have any questions after today's visit, please call [phone].

## 2018-09-26 NOTE — PROGRESS NOTES
Patient: Kei Celeste MRN: [id number]  SSN: [id number]    YOB: 1965  Age: 48 y.o. Sex: female      Date of Service: 9/26/2018   Provider: AHSAN Alatorre   Office Location:   [address] Tucker [place], [address]  Office Phone: [phone]  Office Fax: [phone]      REASON FOR VISIT:   Chief Complaint   Patient presents with    Elevated Blood Pressure    Back Pain        VITALS:   Visit Vitals    BP (!) 170/120 (BP 1 Location: Right arm, BP Patient Position: Sitting)  Comment: manual    Pulse 65    Temp 98.9 °F (37.2 °C) (Oral)    Resp 12    Ht 5' 4\" (1.626 m)    Wt 135 lb 9.6 oz (61.5 kg)    LMP 09/17/2018    SpO2 99%    BMI 23.28 kg/m2        MEDICATIONS:   Current Outpatient Prescriptions on File Prior to Visit   Medication Sig Dispense Refill    diazePAM (VALIUM) 5 mg tablet Take 1 Tab by mouth every eight (8) hours as needed (muscle spasm). Max Daily Amount: 15 mg. 20 Tab 0    ibuprofen (MOTRIN) 800 mg tablet Take 1 Tab by mouth every six (6) hours as needed for Pain for up to 7 days. 20 Tab 0    cyclobenzaprine (FLEXERIL) 5 mg tablet Take 1 Tab by mouth nightly as needed for Muscle Spasm(s). 30 Tab 0    hydrALAZINE (APRESOLINE) 100 mg tablet Take 1 Tab by mouth three (3) times daily. 90 Tab 0    amLODIPine (NORVASC) 10 mg tablet Take 1 Tab by mouth daily. Indications: hypertension 90 Tab 1    lisinopril (PRINIVIL, ZESTRIL) 40 mg tablet Take 1 Tab by mouth daily. 90 Tab 1    metoprolol tartrate (LOPRESSOR) 100 mg IR tablet Take 1 Tab by mouth two (2) times a day. 180 Tab 1    hydroCHLOROthiazide (HYDRODIURIL) 25 mg tablet Take 1 Tab by mouth daily. 30 Tab 3    topiramate (TOPAMAX) 50 mg tablet Take 1 Tab by mouth two (2) times a day. 60 Tab 3    ferrous sulfate (IRON) 325 mg (65 mg iron) EC tablet Take 1 Tab by mouth three (3) times daily (with meals).  30 Tab 0    spironolactone (ALDACTONE) 25 mg tablet Take 1 Tab by mouth daily. 90 Tab 0     No current facility-administered medications on file prior to visit. ALLERGIES:   No Known Allergies     MEDICAL/SURGICAL HISTORY:  Past Medical History:   Diagnosis Date    Abnormal mammogram 3/2016    BIRADS 3 right breast    Hypertension     Stroke ([address] 2016    CVA      Past Surgical History:   Procedure Laterality Date    HX GYN  1982             FAMILY HISTORY:  Family History   Problem Relation Age of Onset    No Known Problems Mother     No Known Problems Father     No Known Problems Sister     No Known Problems Brother     No Known Problems Maternal Aunt     No Known Problems Maternal Uncle     No Known Problems Paternal Aunt     No Known Problems Paternal Uncle     No Known Problems Maternal Grandmother     No Known Problems Paternal Grandmother     No Known Problems Paternal Grandfather     No Known Problems Other         SOCIAL HISTORY:  Social History   Substance Use Topics    Smoking status: Former Smoker     Quit date: 1999    Smokeless tobacco: Never Used    Alcohol use No             HISTORY OF PRESENT ILLNESS: Freddy Lynn is a 48 y.o. female who presents to the office for an ER follow up visit. Patient was seen in the ED at [place] [address] on 18 for acute flank pain. BP was again, very high during that encounter. Workup revealed a chronic, but stable anemia (for which she still has not seen GI). Urinalysis was normal. Pain was ultimately thought to be musculoskeletal in nature, and patient states she was prescribed valium, but has been scared to take it as she has heard bad things about this medication. Pain is located in her left mid-back/flank area and is exacerbated by movement and deep breathing. Her pain is about a 5/10 today, which is improved to some extent from recent ER visit.  She denies associated numbness, tingling, or weakness, fevers, chills, abdominal pain, urinary frequency, urgency, dysuria, hematuria. REVIEW OF SYSTEMS:  ROS as noted in HPI. PHYSICAL EXAMINATION:  Physical Exam   Constitutional: She is oriented to person, place, and time and well-developed, well-nourished, and in no distress. Cardiovascular: Normal rate, regular rhythm and normal heart sounds. Exam reveals no gallop and no friction rub. No murmur heard. Pulmonary/Chest: Effort normal and breath sounds normal. She has no wheezes. She has no rales. Abdominal: Soft. Bowel sounds are normal. She exhibits no distension. There is no tenderness. Musculoskeletal:        Cervical back: She exhibits no bony tenderness, no edema and no deformity. Thoracic back: She exhibits no bony tenderness, no edema and no deformity. Lumbar back: She exhibits decreased range of motion, tenderness and spasm. She exhibits no bony tenderness, no edema and no deformity. Back:    L flank pain reproducible with palpation of the musculature, and with rotation of the thoracolumbar spine. Neurological: She is alert and oriented to person, place, and time. Gait normal.   Skin: Skin is warm and dry. No rash noted. Psychiatric: Mood, memory and affect normal.        RESULTS:  No results found for this visit on 09/26/18. ASSESSMENT/PLAN:  Diagnoses and all orders for this visit:    1. Acute left flank pain  - ED notes reviewed, agree with assessment that this is likely musculoskeletal  - She was encouraged to use the meds prescribed by the ER provider as needed, and to follow up with Dr. Ashley Copeland if symptoms persist  - Patient is concerned about her kidneys. Explained that lack of hematuria on UA is a good sign that there is no kidney stone, but does not definitively rule this out. If pain worsens again, she should return to the ED for expedited workup. - She never did have the renal artery duplex study that I have been trying to encourage her to do for the past several months.  This was re-ordered  Orders:    -     DUPLEX RENAL ART/COREY BILATERAL; Future    2. Uncontrolled hypertension  - Patient continues to be adamant that she is compliant with her medications. Refill history is not suggestive of this. Meds were re-ordered for her today. - We had another long discussion about the long term effects of poorly controlled hypertension and I explained that we can assist her if she is having financial difficulty with her meds. - I am going to refer her to a cardiologist for further workup as I am not sure what else to add to her antihypertensive regimen at this point  Orders:    -     DUPLEX RENAL ART/COREY BILATERAL; Future  -     Conrado Olivo ref [facility]  -     lisinopril (PRINIVIL, ZESTRIL) 40 mg tablet; Take 1 Tab by mouth daily. -     amLODIPine (NORVASC) 10 mg tablet; Take 1 Tab by mouth daily. Indications: hypertension  -     metoprolol tartrate (LOPRESSOR) 100 mg IR tablet; Take 1 Tab by mouth two (2) times a day. -     hydroCHLOROthiazide (HYDRODIURIL) 25 mg tablet; Take 1 Tab by mouth daily. 3. Anxiety  - Patient feels elevated BP is due to stress, and this may certainly be a contributing factor  - She was counseled on stress reduction techniques and encouraged to consider talking to a therapist      Follow-up Disposition:  Return in about 2 weeks (around 10/10/2018) for HTN. BRING ALL MEDICATIONS TO THAT APPOINTMENT      All questions answered. Patient expresses understanding and agrees with the plan as detailed above.     PATIENT CARE TEAM:   Patient Care Team:  AHSAN Morales as PCP - General (Physician Assistant)  Nia Langley MD as Consulting Provider (Neurology)  Berta Stephenson NP (Nurse Practitioner)  Tisha Arce MD (Colon and Rectal Surgery)       AHSAN Morales   September 28, 2018   8:04 AM

## 2018-10-02 ENCOUNTER — TELEPHONE (OUTPATIENT)
Dept: CARDIOLOGY CLINIC | Age: 53
End: 2018-10-02

## 2018-10-03 ENCOUNTER — HOSPITAL ENCOUNTER (OUTPATIENT)
Dept: VASCULAR SURGERY | Age: 53
Discharge: HOME | End: 2018-10-03
Attending: PHYSICIAN ASSISTANT
Payer: SELF-PAY

## 2018-10-03 DIAGNOSIS — I10 UNCONTROLLED HYPERTENSION: ICD-10-CM

## 2018-10-03 DIAGNOSIS — R10.9 ACUTE LEFT FLANK PAIN: ICD-10-CM

## 2018-10-03 PROCEDURE — 93975 VASCULAR STUDY: CPT

## 2018-10-04 LAB
ABDOMINAL PROX AORTA VEL: 97.5 CM/S
LEFT KIDNEY ARCUATE ARTERY MEDIAL EDV: 7.2 CM/S
LEFT KIDNEY ARCUATE ARTERY MEDIAL PSV: 18.1 CM/S
LEFT KIDNEY MED ARCUATE RI: 0.6
LEFT RENAL DIST RAR: 0.9
LEFT RENAL DIST SYS: 87.4 CM/S
LEFT RENAL MID RAR: 1.22
LEFT RENAL MID SYS: 119 CM/S
LEFT RENAL MIDDLE PARENCHYMA MAX: 13.7 CM/S
LEFT RENAL MIDDLE PARENCHYMA MIN: 4.4 CM/S
LEFT RENAL MIDDLE PARENCHYMA RI: 0.68
LEFT RENAL ORIGIN RAR: 1.3
LEFT RENAL ORIGIN SYS: 126.4 CM/S
LEFT RENAL PROX RAR: 1.35
LEFT RENAL PROX SYS: 131.4 CM/S
RIGHT KIDNEY ARCUATE ARTERY SUPERIOR EDV: 5.6 CM/S
RIGHT KIDNEY ARCUATE ARTERY SUPERIOR PSV: 17.4 CM/S
RIGHT KIDNEY LENGTH: 9.65 CM
RIGHT KIDNEY SUP ARCUATE RI: 0.68
RIGHT KIDNEY WIDTH: 4 CM
RIGHT RENAL DIST RAR: 0.86
RIGHT RENAL DIST SYS: 84 CM/S
RIGHT RENAL MID RAR: 1.42
RIGHT RENAL MID SYS: 138.8 CM/S
RIGHT RENAL MIDDLE PARENCHYMA MAX: 23 CM/S
RIGHT RENAL MIDDLE PARENCHYMA MIN: 7.5 CM/S
RIGHT RENAL MIDDLE PARENCHYMA RI: 0.67
RIGHT RENAL ORIGIN RAR: 1.34
RIGHT RENAL ORIGIN SYS: 130.5 CM/S
RIGHT RENAL PROX RAR: 1.18
RIGHT RENAL PROX SYS: 114.6 CM/S

## 2018-10-10 NOTE — PROGRESS NOTES
Patient no-showed her appointment with me yesterday to review results. Please have her reschedule at her earliest convenience. You can let her know there was no significant renal artery stenosis noted on ultrasound.

## 2018-10-11 NOTE — PROGRESS NOTES
Called home number. Left message on answering machine to return the call. This call was concerning message below per Donis FOREMAN.

## 2018-10-16 NOTE — PROGRESS NOTES
Called home number. Verified name and . Spoke with patient. Patient notified of results below per Khris FOREMAN. Patient understood the reason for call and had no further questions or concerns. Appointment 10/24/2018.

## 2018-10-24 ENCOUNTER — OFFICE VISIT (OUTPATIENT)
Dept: FAMILY MEDICINE CLINIC | Age: 53
End: 2018-10-24

## 2018-10-24 VITALS
HEART RATE: 55 BPM | SYSTOLIC BLOOD PRESSURE: 204 MMHG | HEIGHT: 64 IN | WEIGHT: 136.4 LBS | BODY MASS INDEX: 23.29 KG/M2 | TEMPERATURE: 98.8 F | RESPIRATION RATE: 12 BRPM | DIASTOLIC BLOOD PRESSURE: 110 MMHG | OXYGEN SATURATION: 99 %

## 2018-10-24 DIAGNOSIS — I10 UNCONTROLLED HYPERTENSION: Primary | ICD-10-CM

## 2018-10-24 DIAGNOSIS — Z12.11 SCREENING FOR COLON CANCER: ICD-10-CM

## 2018-10-24 NOTE — PROGRESS NOTES
Patient: Can Fierro MRN: [id number]  SSN: [id number]    YOB: 1965  Age: 48 y.o. Sex: female      Date of Service: 10/24/2018   Provider: AHSAN Juarez   Office Location:   [address] [place] [address] Dr Tucker [place], [address]  Office Phone: [phone]  Office Fax: [phone]      REASON FOR VISIT:   Chief Complaint   Patient presents with    Hypertension        VITALS:   Visit Vitals  BP (!) 204/110 (BP 1 Location: Right arm, BP Patient Position: Sitting) Comment: manual   Pulse (!) 55   Temp 98.8 °F (37.1 °C) (Oral)   Resp 12   Ht 5' 4\" (1.626 m)   Wt 136 lb 6.4 oz (61.9 kg)   LMP 10/19/2018   SpO2 99%   BMI 23.41 kg/m²        MEDICATIONS:   Current Outpatient Medications on File Prior to Visit   Medication Sig Dispense Refill    lisinopril (PRINIVIL, ZESTRIL) 40 mg tablet Take 1 Tab by mouth daily. 90 Tab 0    amLODIPine (NORVASC) 10 mg tablet Take 1 Tab by mouth daily. Indications: hypertension 90 Tab 0    metoprolol tartrate (LOPRESSOR) 100 mg IR tablet Take 1 Tab by mouth two (2) times a day. 180 Tab 0    hydroCHLOROthiazide (HYDRODIURIL) 25 mg tablet Take 1 Tab by mouth daily. 90 Tab 0    diazePAM (VALIUM) 5 mg tablet Take 1 Tab by mouth every eight (8) hours as needed (muscle spasm). Max Daily Amount: 15 mg. 20 Tab 0    ferrous sulfate (IRON) 325 mg (65 mg iron) EC tablet Take 1 Tab by mouth three (3) times daily (with meals). 30 Tab 0    cyclobenzaprine (FLEXERIL) 5 mg tablet Take 1 Tab by mouth nightly as needed for Muscle Spasm(s). 30 Tab 0    spironolactone (ALDACTONE) 25 mg tablet Take 1 Tab by mouth daily. 90 Tab 0    hydrALAZINE (APRESOLINE) 100 mg tablet Take 1 Tab by mouth three (3) times daily. 90 Tab 0    topiramate (TOPAMAX) 50 mg tablet Take 1 Tab by mouth two (2) times a day. 60 Tab 3     No current facility-administered medications on file prior to visit.          ALLERGIES:   No Known Allergies     MEDICAL/SURGICAL HISTORY:  Past Medical History:   Diagnosis Date    Abnormal mammogram 3/2016    BIRADS 3 right breast    Hypertension     Stroke [facility]) 2016    CVA      Past Surgical History:   Procedure Laterality Date    HX GYN  1982             FAMILY HISTORY:  Family History   Problem Relation Age of Onset    No Known Problems Mother     No Known Problems Father     No Known Problems Sister     No Known Problems Brother     No Known Problems Maternal Aunt     No Known Problems Maternal Uncle     No Known Problems Paternal Aunt     No Known Problems Paternal Uncle     No Known Problems Maternal Grandmother     No Known Problems Paternal Grandmother     No Known Problems Paternal Grandfather     No Known Problems Other         SOCIAL HISTORY:  Social History     Tobacco Use    Smoking status: Former Smoker     Last attempt to quit: 1999     Years since quittin.9    Smokeless tobacco: Never Used   Substance Use Topics    Alcohol use: No    Drug use: No             HISTORY OF PRESENT ILLNESS: Carmela Pratt is a 48 y.o. female who presents to the office for a routine follow up visit. Here today in follow up of hypertension and to review the results of her renal artery ultrasound, which was done as part of secondary HTN workup and was normal. Her BP remains very elevated despite reported compliance with lisinopril 40 mg daily, amlodipine 10 mg daily, metoprolol 100 mg BID, HCTZ 25 mg daily, spironolactone 25 mg daily, hydralazine 100 mg TID. There had been some concern for compliance, as a nurse from Dr. Radha Green office had called the pharmacy and was told that patient had not been filling her meds. This was relayed to me, and when I questioned the patient at her last visit, she was insistent that she is taking her meds as prescribed, and she too is very frustrated that her pressure is still so high.  She does think stress might be contributing as her life has been very hectic lately. She is seeing cardiology next week and does intend to keep that appointment. REVIEW OF SYSTEMS:  Review of Systems   Constitutional: Negative for chills and fever. Eyes: Negative for blurred vision and double vision. Respiratory: Negative for cough, shortness of breath and wheezing. Cardiovascular: Negative for chest pain, palpitations and leg swelling. Musculoskeletal: Positive for back pain ( chronic). Neurological: Negative for dizziness and headaches. PHYSICAL EXAMINATION:  Physical Exam   Constitutional: She is oriented to person, place, and time and well-developed, well-nourished, and in no distress. Cardiovascular: Normal rate, regular rhythm and normal heart sounds. Exam reveals no gallop and no friction rub. No murmur heard. Pulmonary/Chest: Effort normal and breath sounds normal. She has no wheezes. She has no rales. Neurological: She is alert and oriented to person, place, and time. Gait normal.   Skin: Skin is warm and dry. No rash noted. Psychiatric: Mood, memory and affect normal.        RESULTS:  No results found for this visit on 10/24/18. ASSESSMENT/PLAN:  Diagnoses and all orders for this visit:    1. Uncontrolled hypertension  - Blood pressure remains poorly controlled despite SIX antihypertensive medications at near maximum dosages. - Compliance is still a question, but patient insists that she has been taking her meds as prescribed. - Renal artery duplex was negative for renal artery stenosis  - Echocardiogram revealed mild left atrial dilation, normal EF, elevated pulmonary arterial pressure. - EKG showed sinus nory, minimal voltage criteria for LVH, possible remote septal infarct   - She has been referred to pulmonology for evaluation of the elevated pulmonary arterial pressure and for a sleep study to r/o underlying sleep apnea. She has not yet made an appointment.  I explained to her that this is important, because underlying HEATH may be affecting her blood pressure. - Patient feels stress is a large contributor as well. Offered medications or referral to therapy for anxiety, but she prefers to wait for now. Told her she can reach out to me at any time if she changes her mind. - I am unsure where to go from here. She is seeing cardiology next week for a second opinion. Appreciate any specialist input. - I explained to patient that I am very worried about her and that she is very high risk for a stroke right now. Compliance with follow up appointments, testing, and medication regimen was reiterated. 2. Screening for colon cancer  Orders:    -     OCCULT BLOOD IMMUNOASSAY,DIAGNOSTIC; Future        Follow-up Disposition:  Return in about 1 month (around 11/24/2018). All questions answered. Patient expresses understanding and agrees with the plan as detailed above.     PATIENT CARE TEAM:   Patient Care Team:  AHSAN Xiao as PCP - General (Physician Assistant)  Genevieve Quseada MD as Consulting Provider (Neurology)  Avila Soto NP (Nurse Practitioner)  Emily Simpson MD (Colon and Rectal Surgery)       AHSAN Swan   October 25, 2018   8:00 AM

## 2018-10-24 NOTE — PROGRESS NOTES
Chief Complaint   Patient presents with    Hypertension     Patient is not fasting. Patient in room # 6.     1. Have you been to the ER, urgent care clinic since your last visit? Hospitalized since your last visit? No    2. Have you seen or consulted any other health care providers outside of the [address] [place] [address] since your last visit? Include any pap smears or colon screening. No     Reviewed.  Flu on hold

## 2018-10-24 NOTE — PATIENT INSTRUCTIONS
A Healthy Lifestyle: Care Instructions  Your Care Instructions    A healthy lifestyle can help you feel good, stay at a healthy weight, and have plenty of energy for both work and play. A healthy lifestyle is something you can share with your whole family. A healthy lifestyle also can lower your risk for serious health problems, such as high blood pressure, heart disease, and diabetes. You can follow a few steps listed below to improve your health and the health of your family. Follow-up care is a key part of your treatment and safety. Be sure to make and go to all appointments, and call your doctor if you are having problems. It's also a good idea to know your test results and keep a list of the medicines you take. How can you care for yourself at home? · Do not eat too much sugar, fat, or fast foods. You can still have dessert and treats now and then. The goal is moderation. · Start small to improve your eating habits. Pay attention to portion sizes, drink less juice and soda pop, and eat more fruits and vegetables. ? Eat a healthy amount of food. A 3-ounce serving of meat, for example, is about the size of a deck of cards. Fill the rest of your plate with vegetables and whole grains. ? Limit the amount of soda and sports drinks you have every day. Drink more water when you are thirsty. ? Eat at least 5 servings of fruits and vegetables every day. It may seem like a lot, but it is not hard to reach this goal. A serving or helping is 1 piece of fruit, 1 cup of vegetables, or 2 cups of leafy, raw vegetables. Have an apple or some carrot sticks as an afternoon snack instead of a candy bar. Try to have fruits and/or vegetables at every meal.  · Make exercise part of your daily routine. You may want to start with simple activities, such as walking, bicycling, or slow swimming. Try to be active 30 to 60 minutes every day. You do not need to do all 30 to 60 minutes all at once.  For example, you can exercise 3 times a day for 10 or 20 minutes. Moderate exercise is safe for most people, but it is always a good idea to talk to your doctor before starting an exercise program.  · Keep moving. Sarah Lew the lawn, work in the garden, or [employer]. Take the stairs instead of the elevator at work. · If you smoke, quit. People who smoke have an increased risk for heart attack, stroke, cancer, and other lung illnesses. Quitting is hard, but there are ways to boost your chance of quitting tobacco for good. ? Use nicotine gum, patches, or lozenges. ? Ask your doctor about stop-smoking programs and medicines. ? Keep trying. In addition to reducing your risk of diseases in the future, you will notice some benefits soon after you stop using tobacco. If you have shortness of breath or asthma symptoms, they will likely get better within a few weeks after you quit. · Limit how much alcohol you drink. Moderate amounts of alcohol (up to 2 drinks a day for men, 1 drink a day for women) are okay. But drinking too much can lead to liver problems, high blood pressure, and other health problems. Family health  If you have a family, there are many things you can do together to improve your health. · Eat meals together as a family as often as possible. · Eat healthy foods. This includes fruits, vegetables, lean meats and dairy, and whole grains. · Include your family in your fitness plan. Most people think of activities such as jogging or tennis as the way to fitness, but there are many ways you and your family can be more active. Anything that makes you breathe hard and gets your heart pumping is exercise. Here are some tips:  ? Walk to do errands or to take your child to school or the bus.  ? Go for a family bike ride after dinner instead of watching TV. Where can you learn more? Go to http://collette-grecia.info/. Enter D491 in the search box to learn more about \"A Healthy Lifestyle: Care Instructions. \"  Current as of: December 7, 2017  Content Version: 11.8  © 2515-3644 [employer]. Care instructions adapted under license by [employer] (which disclaims liability or warranty for this information). If you have questions about a medical condition or this instruction, always ask your healthcare professional. [address] any warranty or liability for your use of this information.

## 2018-10-30 ENCOUNTER — OFFICE VISIT (OUTPATIENT)
Dept: CARDIOLOGY CLINIC | Age: 53
End: 2018-10-30

## 2018-10-30 VITALS
DIASTOLIC BLOOD PRESSURE: 101 MMHG | WEIGHT: 137 LBS | OXYGEN SATURATION: 99 % | SYSTOLIC BLOOD PRESSURE: 187 MMHG | HEART RATE: 58 BPM | BODY MASS INDEX: 23.52 KG/M2

## 2018-10-30 DIAGNOSIS — D50.0 IRON DEFICIENCY ANEMIA DUE TO CHRONIC BLOOD LOSS: ICD-10-CM

## 2018-10-30 DIAGNOSIS — I10 ESSENTIAL HYPERTENSION WITH GOAL BLOOD PRESSURE LESS THAN 140/90: Chronic | ICD-10-CM

## 2018-10-30 DIAGNOSIS — D64.9 ANEMIA, UNSPECIFIED TYPE: ICD-10-CM

## 2018-10-30 DIAGNOSIS — I63.9 ACUTE CEREBRAL INFARCTION (HCC): ICD-10-CM

## 2018-10-30 DIAGNOSIS — D64.9 ANEMIA, UNSPECIFIED TYPE: Primary | ICD-10-CM

## 2018-10-30 DIAGNOSIS — I10 ESSENTIAL HYPERTENSION: Chronic | ICD-10-CM

## 2018-10-30 RX ORDER — CHLORTHALIDONE 25 MG/1
25 TABLET ORAL DAILY
Qty: 30 TAB | Refills: 6 | Status: SHIPPED | OUTPATIENT
Start: 2018-10-30 | End: 2019-06-10 | Stop reason: SDUPTHER

## 2018-10-30 NOTE — PROGRESS NOTES
1. Have you been to the ER, urgent care clinic since your last visit? Hospitalized since your last visit? No  
 
2. Have you seen or consulted any other health care providers outside of the [address] [place] [address] since your last visit? Include any pap smears or colon screening.  NO

## 2018-10-30 NOTE — PATIENT INSTRUCTIONS
Stop HCTZ Start Chlorthalidone 25mg Daily Renin/Suleiman ratio -  
lab work in [address] [place] [address] 150 Referral to GI for anemia -  
 
[facility] Physician Specialty Languages Gastroenterology [address] Physician Primary Contact Information Phone Fax E-mail Address [phone] Not available [place]  [address] [address]

## 2018-11-05 PROBLEM — D50.0 IRON DEFICIENCY ANEMIA DUE TO CHRONIC BLOOD LOSS: Status: ACTIVE | Noted: 2018-11-05

## 2018-11-05 NOTE — PROGRESS NOTES
Subjective:  
   Crystals in the office today for cardiac evaluation. She is a 59-year-old woman that has had hypertension since 2007. She was first diagnosed when she was incarcerated at that time. She has been taking medications regularly since that date. She is referred for difficult to control blood pressure. Patient has had no chest pain or shortness of breath. She had no PND orthopnea. She has had no palpitations near syncope or syncope. Her main complaints are that of being tired and \"always cold\". Patient was noted to be quite anemic on recent CBC. Her hemoglobin and hematocrit were 8.7/29.2. MCV was 69.2. Iron was 20. TIBC was 402. Iron saturation was 5%. T IBC was 422 The patient does report some heavy menses at times. She has had no blood per stool. She denies black stools. She has no history of peptic ulcer disease. She has not had epigastric discomfort. Patient Active Problem List  
 Diagnosis Date Noted  Iron deficiency anemia due to chronic blood loss 11/05/2018  Lumbosacral spondylosis without myelopathy 08/09/2018  DDD (degenerative disc disease), lumbar 08/09/2018  Lumbar neuritis 08/09/2018  Scoliosis of lumbar spine 08/09/2018  Essential hypertension with goal blood pressure less than 140/90 04/18/2018  Anxiety 04/18/2018  Numbness and tingling 08/01/2016  Acute cerebral infarction ([facility] [address] 08/01/2016  Hypertension 05/15/2014  Back pain 05/15/2014 Current Outpatient Medications Medication Sig Dispense Refill  chlorthalidone (HYGROTEN) 25 mg tablet Take 1 Tab by mouth daily. 30 Tab 6  
 lisinopril (PRINIVIL, ZESTRIL) 40 mg tablet Take 1 Tab by mouth daily. 90 Tab 0  
 amLODIPine (NORVASC) 10 mg tablet Take 1 Tab by mouth daily. Indications: hypertension 90 Tab 0  
 metoprolol tartrate (LOPRESSOR) 100 mg IR tablet Take 1 Tab by mouth two (2) times a day.  180 Tab 0  
  diazePAM (VALIUM) 5 mg tablet Take 1 Tab by mouth every eight (8) hours as needed (muscle spasm). Max Daily Amount: 15 mg. 20 Tab 0  
 ferrous sulfate (IRON) 325 mg (65 mg iron) EC tablet Take 1 Tab by mouth three (3) times daily (with meals). 30 Tab 0  cyclobenzaprine (FLEXERIL) 5 mg tablet Take 1 Tab by mouth nightly as needed for Muscle Spasm(s). 30 Tab 0  
 spironolactone (ALDACTONE) 25 mg tablet Take 1 Tab by mouth daily. 90 Tab 0  
 hydrALAZINE (APRESOLINE) 100 mg tablet Take 1 Tab by mouth three (3) times daily. 90 Tab 0  
 topiramate (TOPAMAX) 50 mg tablet Take 1 Tab by mouth two (2) times a day. 60 Tab 3 No Known Allergies Past Medical History:  
Diagnosis Date  Abnormal mammogram 3/2016 BIRADS 3 right breast  
 Hypertension  Stroke ([address] 2016 CVA Past Surgical History:  
Procedure Laterality Date [address]  
  Family History Problem Relation Age of Onset  No Known Problems Mother  No Known Problems Father  No Known Problems Sister  No Known Problems Brother  No Known Problems Maternal Aunt  No Known Problems Maternal Uncle  No Known Problems Paternal Aunt  No Known Problems Paternal Uncle  No Known Problems Maternal Grandmother  No Known Problems Paternal Grandmother  No Known Problems Paternal Grandfather  No Known Problems Other Social History Tobacco Use Smoking Status Former Smoker  Last attempt to quit: 1999  Years since quittin.9 Smokeless Tobacco Never Used Review of Systems, additional: 
Constitutional: negative Eyes: negative Respiratory: negative Cardiovascular: positive for fatigue Gastrointestinal: negative Musculoskeletal:negative Neurological: negative Behvioral/Psych: negative Endocrine: negative ENT: negative Objective:  
 
Visit Vitals BP (!) 187/101 Pulse (!) 58 Wt 137 lb (62.1 kg) LMP 10/19/2018 SpO2 99% BMI 23.52 kg/m² General:  alert, cooperative, no distress Chest Wall: inspection normal - no chest wall deformities or tenderness, respiratory effort normal  
Lung: clear to auscultation bilaterally Heart:  normal rate and regular rhythm, S1 and S2 normal, no murmurs noted, no gallops noted, no JVD Abdomen: soft, non-tender. Bowel sounds normal. No masses,  no organomegaly Extremities: extremities normal, atraumatic, no cyanosis or edema Skin: no rashes Neuro: alert, oriented, normal speech, no focal findings or movement disorder noted [id number]. Sinus bradycardia. LVH by voltage criteria. Assessment/Plan: ICD-10-CM ICD-9-CM 1. Anemia, unspecified type, will refer to gastroenterology. The patient has never had screening colonoscopy. She also needs to set up an appointment with her GYN physician. D64.9 285.9 REFERRAL TO GASTROENTEROLOGY ALDOSTERONE/RENIN RATIO 2. Essential hypertension with goal blood pressure less than 140/90, markedly elevated in the office today. Patient has not taken all of her medications today. Will check aldosterone/renin ratio. I10 401.9 3. Essential hypertension, will order chlorthalidone. DC hydrochlorothiazide. Suggest the patient acquire a blood pressure cuff. Return in 3 weeks. I10 401.9 4. Acute cerebral infarction (HCC) I63.9 434.91   
5. Iron deficiency anemia due to chronic blood loss D50.0 280.0

## 2018-12-11 ENCOUNTER — OFFICE VISIT (OUTPATIENT)
Dept: FAMILY MEDICINE CLINIC | Age: 53
End: 2018-12-11

## 2018-12-11 VITALS
WEIGHT: 134.6 LBS | BODY MASS INDEX: 22.98 KG/M2 | SYSTOLIC BLOOD PRESSURE: 216 MMHG | HEART RATE: 52 BPM | OXYGEN SATURATION: 98 % | TEMPERATURE: 98.3 F | DIASTOLIC BLOOD PRESSURE: 120 MMHG | HEIGHT: 64 IN | RESPIRATION RATE: 12 BRPM

## 2018-12-11 DIAGNOSIS — G89.29 CHRONIC BILATERAL LOW BACK PAIN WITHOUT SCIATICA: ICD-10-CM

## 2018-12-11 DIAGNOSIS — M54.50 CHRONIC BILATERAL LOW BACK PAIN WITHOUT SCIATICA: ICD-10-CM

## 2018-12-11 DIAGNOSIS — Z91.199 NON-COMPLIANCE: ICD-10-CM

## 2018-12-11 DIAGNOSIS — I10 UNCONTROLLED HYPERTENSION: Primary | ICD-10-CM

## 2018-12-11 DIAGNOSIS — K08.89 PAIN, DENTAL: ICD-10-CM

## 2018-12-11 DIAGNOSIS — F43.0 ACUTE STRESS REACTION: ICD-10-CM

## 2018-12-11 RX ORDER — CYCLOBENZAPRINE HCL 5 MG
5 TABLET ORAL
Qty: 30 TAB | Refills: 0 | Status: SHIPPED | OUTPATIENT
Start: 2018-12-11 | End: 2019-05-30 | Stop reason: SDUPTHER

## 2018-12-11 NOTE — PROGRESS NOTES
Chief Complaint   Patient presents with    Hypertension     Patient is not fasting. Patient in room # 6.     1. Have you been to the ER, urgent care clinic since your last visit? Hospitalized since your last visit? No    2. Have you seen or consulted any other health care providers outside of the [address] [place] [address] since your last visit? Include any pap smears or colon screening. No     Reviewed.

## 2018-12-11 NOTE — PROGRESS NOTES
Patient: Carmel Hansen MRN: [id number]  SSN: [id number]    YOB: 1965  Age: 48 y.o. Sex: female      Date of Service: 12/11/2018   Provider: AHSAN Huber   Office Location:   [address] [facility] [address], [address]  Office Phone: [phone]  Office Fax: [phone]        REASON FOR VISIT:   Chief Complaint   Patient presents with    Hypertension        VITALS:   Visit Vitals  BP (!) 216/120 (BP 1 Location: Right arm, BP Patient Position: Sitting) Comment: manual   Pulse (!) 52   Temp 98.3 °F (36.8 °C) (Oral)   Resp 12   Ht 5' 4\" (1.626 m)   Wt 134 lb 9.6 oz (61.1 kg)   LMP 12/08/2018   SpO2 98%   BMI 23.10 kg/m²       MEDICATIONS:   Current Outpatient Medications   Medication Sig Dispense Refill    chlorthalidone (HYGROTEN) 25 mg tablet Take 1 Tab by mouth daily. 30 Tab 6    lisinopril (PRINIVIL, ZESTRIL) 40 mg tablet Take 1 Tab by mouth daily. 90 Tab 0    amLODIPine (NORVASC) 10 mg tablet Take 1 Tab by mouth daily. Indications: hypertension 90 Tab 0    metoprolol tartrate (LOPRESSOR) 100 mg IR tablet Take 1 Tab by mouth two (2) times a day. 180 Tab 0    ferrous sulfate (IRON) 325 mg (65 mg iron) EC tablet Take 1 Tab by mouth three (3) times daily (with meals). 30 Tab 0    spironolactone (ALDACTONE) 25 mg tablet Take 1 Tab by mouth daily. 90 Tab 0    hydrALAZINE (APRESOLINE) 100 mg tablet Take 1 Tab by mouth three (3) times daily. 90 Tab 0    topiramate (TOPAMAX) 50 mg tablet Take 1 Tab by mouth two (2) times a day. 60 Tab 3    diazePAM (VALIUM) 5 mg tablet Take 1 Tab by mouth every eight (8) hours as needed (muscle spasm). Max Daily Amount: 15 mg. 20 Tab 0    cyclobenzaprine (FLEXERIL) 5 mg tablet Take 1 Tab by mouth nightly as needed for Muscle Spasm(s).  30 Tab 0        ALLERGIES:   No Known Allergies     ACTIVE MEDICAL PROBLEMS:  Patient Active Problem List   Diagnosis Code    Hypertension I10    Back pain M54.9    Numbness and tingling R20.0, R20.2    Acute cerebral infarction (HCC) I63.9    Essential hypertension with goal blood pressure less than 140/90 I10    Anxiety F41.9    Lumbosacral spondylosis without myelopathy M47.817    DDD (degenerative disc disease), lumbar M51.36    Lumbar neuritis M54.16    Scoliosis of lumbar spine M41.9    Iron deficiency anemia due to chronic blood loss D50.0          HISTORY OF PRESENT ILLNESS:   Mariaa Rose is a 48 y.o. female who presents to the office for acute care. Here today for evaluation of uncontrolled blood pressure. Patient reports that she needs to have a dental filling done, but she needs clearance from her primary care provider due to her blood pressure being so elevated. Patient admits she has not been compliant with all of her medications lately. It is unclear to me which medications she has and has not been taking. Her blood pressure is extremely high today. She was referred to cardiology for refractory hypertension, and has a follow up visit tomorrow. She has not had the renin/aldosterone testing done. She was also advised to follow up with ob/gyn and GI due to her anemia, but has done neither due to lack of health insurance. I did order a FIT test for her at her last visit, and she admits this is still in her car. She attributes her elevation in blood pressure partially to dental pain, and partially due to the fact that she has been under a lot of stress lately. She and her fiance had been living with her fiance's sister, who recently kicked them out and took them to court. She has been under a lot of pressure to find a new place to live. She plans to fix up an old home that her grandparents owned in [place], but it needs a new HVAC and this is going to be very expensive. REVIEW OF SYSTEMS:  Review of Systems   Constitutional: Negative for chills, fever and malaise/fatigue.    Respiratory: Negative for cough, shortness of breath and wheezing. Cardiovascular: Negative for chest pain, palpitations and leg swelling. Neurological: Negative for dizziness and headaches. PHYSICAL EXAMINATION:  Physical Exam   Constitutional: She is oriented to person, place, and time and well-developed, well-nourished, and in no distress. Cardiovascular: Normal rate, regular rhythm and normal heart sounds. Exam reveals no gallop and no friction rub. No murmur heard. Pulmonary/Chest: Effort normal and breath sounds normal. She has no wheezes. She has no rales. Neurological: She is alert and oriented to person, place, and time. Gait normal.   Skin: Skin is warm and dry. No rash noted. Psychiatric: Mood, memory and affect normal.        RESULTS:  No results found for this visit on 12/11/18. ASSESSMENT/PLAN:  Diagnoses and all orders for this visit:    1. Uncontrolled hypertension  2. Non-compliance  - Reiterated importance of compliance with medications. Explained to Ms. Ramirez that I am very worried she is going to have an MI or stroke. Her blood pressure is dangerously high. I cannot clear her for any type of dental procedure with her blood pressure this elevated. - Reminded her that we have some options for financial assistance with medication if cost is a concern. - She will resume her meds, and return to see me in a week  - Keep cardiology follow up tomorrow. Have renin/aldosterone levels checked TODAY. 3. Pain, dental  - See above re: clearance for dental work     4. Acute stress reaction  - Some of patient's BP elevation today may be attributed to stress. Encouraged her to consider talking to a therapist     5. Chronic bilateral low back pain without sciatica  - Flexeril refilled  Orders:    -     cyclobenzaprine (FLEXERIL) 5 mg tablet; Take 1 Tab by mouth nightly as needed for Muscle Spasm(s). All questions answered. Patient expresses understanding and agrees with the plan as detailed above.     Follow-up Disposition:  Return in about 1 week (around 12/18/2018) for B/P check.        PATIENT CARE TEAM:   Patient Care Team:  AHSAN Arias as PCP - General (Physician Assistant)  Katalina Mcclure MD as Consulting Provider (Neurology)  Brianna Escobedo NP (Nurse Practitioner)  Sang Rogers MD (Colon and Rectal Surgery)       AHSAN Nelson   December 11, 2018   4:57 PM

## 2018-12-11 NOTE — PATIENT INSTRUCTIONS
1. Have blood work for Dr. Marleny Casey done TODAY  2. Do FIT test with your next bowel movement   3. Start taking ALL of your blood pressure pills!!!  4. Return to see me next week for BP check and clearance for dental procedure      High Blood Pressure: Care Instructions  Your Care Instructions    If your blood pressure is usually above 130/80, you have high blood pressure, or hypertension. That means the top number is 130 or higher or the bottom number is 80 or higher, or both. Despite what a lot of people think, high blood pressure usually doesn't cause headaches or make you feel dizzy or lightheaded. It usually has no symptoms. But it does increase your risk for heart attack, stroke, and kidney or eye damage. The higher your blood pressure, the more your risk increases. Your doctor will give you a goal for your blood pressure. Your goal will be based on your health and your age. Lifestyle changes, such as eating healthy and being active, are always important to help lower blood pressure. You might also take medicine to reach your blood pressure goal.  Follow-up care is a key part of your treatment and safety. Be sure to make and go to all appointments, and call your doctor if you are having problems. It's also a good idea to know your test results and keep a list of the medicines you take. How can you care for yourself at home? Medical treatment  · If you stop taking your medicine, your blood pressure will go back up. You may take one or more types of medicine to lower your blood pressure. Be safe with medicines. Take your medicine exactly as prescribed. Call your doctor if you think you are having a problem with your medicine. · Talk to your doctor before you start taking aspirin every day. Aspirin can help certain people lower their risk of a heart attack or stroke. But taking aspirin isn't right for everyone, because it can cause serious bleeding. · See your doctor regularly.  You may need to see the doctor more often at first or until your blood pressure comes down. · If you are taking blood pressure medicine, talk to your doctor before you take decongestants or anti-inflammatory medicine, such as ibuprofen. Some of these medicines can raise blood pressure. · Learn how to check your blood pressure at home. Lifestyle changes  · Stay at a healthy weight. This is especially important if you put on weight around the waist. Losing even 10 pounds can help you lower your blood pressure. · If your doctor recommends it, get more exercise. Walking is a good choice. Bit by bit, increase the amount you walk every day. Try for at least 30 minutes on most days of the week. You also may want to swim, bike, or do other activities. · Avoid or limit alcohol. Talk to your doctor about whether you can drink any alcohol. · Try to limit how much sodium you eat to less than 2,300 milligrams (mg) a day. Your doctor may ask you to try to eat less than 1,500 mg a day. · Eat plenty of fruits (such as bananas and oranges), vegetables, legumes, whole grains, and low-fat dairy products. · Lower the amount of saturated fat in your diet. Saturated fat is found in animal products such as milk, cheese, and meat. Limiting these foods may help you lose weight and also lower your risk for heart disease. · Do not smoke. Smoking increases your risk for heart attack and stroke. If you need help quitting, talk to your doctor about stop-smoking programs and medicines. These can increase your chances of quitting for good. When should you call for help? Call 911 anytime you think you may need emergency care. This may mean having symptoms that suggest that your blood pressure is causing a serious heart or blood vessel problem. Your blood pressure may be over 180/120.   For example, call 911 if:    · You have symptoms of a heart attack. These may include:  ? Chest pain or pressure, or a strange feeling in the chest.  ? Sweating. ?  Shortness of breath. ? Nausea or vomiting. ? Pain, pressure, or a strange feeling in the back, neck, jaw, or upper belly or in one or both shoulders or arms. ? Lightheadedness or sudden weakness. ? A fast or irregular heartbeat.     · You have symptoms of a stroke. These may include:  ? Sudden numbness, tingling, weakness, or loss of movement in your face, arm, or leg, especially on only one side of your body. ? Sudden vision changes. ? Sudden trouble speaking. ? Sudden confusion or trouble understanding simple statements. ? Sudden problems with walking or balance. ? A sudden, severe headache that is different from past headaches.     · You have severe back or belly pain.    Do not wait until your blood pressure comes down on its own. Get help right away.   Call your doctor now or seek immediate care if:    · Your blood pressure is much higher than normal (such as 180/120 or higher), but you don't have symptoms.     · You think high blood pressure is causing symptoms, such as:  ? Severe headache.  ? Blurry vision.    Watch closely for changes in your health, and be sure to contact your doctor if:    · Your blood pressure measures higher than your doctor recommends at least 2 times. That means the top number is higher or the bottom number is higher, or both.     · You think you may be having side effects from your blood pressure medicine. Where can you learn more? Go to http://collette-grecia.info/. Enter W128 in the search box to learn more about \"High Blood Pressure: Care Instructions. \"  Current as of: December 6, 2017  Content Version: 11.8  © 8541-3942 Healthwise, Incorporated. Care instructions adapted under license by [employer] (which disclaims liability or warranty for this information).  If you have questions about a medical condition or this instruction, always ask your healthcare professional. [place] [address] any warranty or liability for your use of this information. High Blood Pressure: Care Instructions  Your Care Instructions    If your blood pressure is usually above 130/80, you have high blood pressure, or hypertension. That means the top number is 130 or higher or the bottom number is 80 or higher, or both. Despite what a lot of people think, high blood pressure usually doesn't cause headaches or make you feel dizzy or lightheaded. It usually has no symptoms. But it does increase your risk for heart attack, stroke, and kidney or eye damage. The higher your blood pressure, the more your risk increases. Your doctor will give you a goal for your blood pressure. Your goal will be based on your health and your age. Lifestyle changes, such as eating healthy and being active, are always important to help lower blood pressure. You might also take medicine to reach your blood pressure goal.  Follow-up care is a key part of your treatment and safety. Be sure to make and go to all appointments, and call your doctor if you are having problems. It's also a good idea to know your test results and keep a list of the medicines you take. How can you care for yourself at home? Medical treatment  · If you stop taking your medicine, your blood pressure will go back up. You may take one or more types of medicine to lower your blood pressure. Be safe with medicines. Take your medicine exactly as prescribed. Call your doctor if you think you are having a problem with your medicine. · Talk to your doctor before you start taking aspirin every day. Aspirin can help certain people lower their risk of a heart attack or stroke. But taking aspirin isn't right for everyone, because it can cause serious bleeding. · See your doctor regularly. You may need to see the doctor more often at first or until your blood pressure comes down. · If you are taking blood pressure medicine, talk to your doctor before you take decongestants or anti-inflammatory medicine, such as ibuprofen. Some of these medicines can raise blood pressure. · Learn how to check your blood pressure at home. Lifestyle changes  · Stay at a healthy weight. This is especially important if you put on weight around the waist. Losing even 10 pounds can help you lower your blood pressure. · If your doctor recommends it, get more exercise. Walking is a good choice. Bit by bit, increase the amount you walk every day. Try for at least 30 minutes on most days of the week. You also may want to swim, bike, or do other activities. · Avoid or limit alcohol. Talk to your doctor about whether you can drink any alcohol. · Try to limit how much sodium you eat to less than 2,300 milligrams (mg) a day. Your doctor may ask you to try to eat less than 1,500 mg a day. · Eat plenty of fruits (such as bananas and oranges), vegetables, legumes, whole grains, and low-fat dairy products. · Lower the amount of saturated fat in your diet. Saturated fat is found in animal products such as milk, cheese, and meat. Limiting these foods may help you lose weight and also lower your risk for heart disease. · Do not smoke. Smoking increases your risk for heart attack and stroke. If you need help quitting, talk to your doctor about stop-smoking programs and medicines. These can increase your chances of quitting for good. When should you call for help? Call 911 anytime you think you may need emergency care. This may mean having symptoms that suggest that your blood pressure is causing a serious heart or blood vessel problem. Your blood pressure may be over 180/120.   For example, call 911 if:    · You have symptoms of a heart attack. These may include:  ? Chest pain or pressure, or a strange feeling in the chest.  ? Sweating. ? Shortness of breath. ? Nausea or vomiting. ? Pain, pressure, or a strange feeling in the back, neck, jaw, or upper belly or in one or both shoulders or arms. ? Lightheadedness or sudden weakness.   ? A fast or irregular heartbeat.     · You have symptoms of a stroke. These may include:  ? Sudden numbness, tingling, weakness, or loss of movement in your face, arm, or leg, especially on only one side of your body. ? Sudden vision changes. ? Sudden trouble speaking. ? Sudden confusion or trouble understanding simple statements. ? Sudden problems with walking or balance. ? A sudden, severe headache that is different from past headaches.     · You have severe back or belly pain.    Do not wait until your blood pressure comes down on its own. Get help right away.   Call your doctor now or seek immediate care if:    · Your blood pressure is much higher than normal (such as 180/120 or higher), but you don't have symptoms.     · You think high blood pressure is causing symptoms, such as:  ? Severe headache.  ? Blurry vision.    Watch closely for changes in your health, and be sure to contact your doctor if:    · Your blood pressure measures higher than your doctor recommends at least 2 times. That means the top number is higher or the bottom number is higher, or both.     · You think you may be having side effects from your blood pressure medicine. Where can you learn more? Go to http://collette-grecia.info/. Enter W098 in the search box to learn more about \"High Blood Pressure: Care Instructions. \"  Current as of: December 6, 2017  Content Version: 11.8  © 2848-7746 Healthwise, Incorporated. Care instructions adapted under license by [employer] (which disclaims liability or warranty for this information). If you have questions about a medical condition or this instruction, always ask your healthcare professional. [place] [address] any warranty or liability for your use of this information.

## 2018-12-19 ENCOUNTER — OFFICE VISIT (OUTPATIENT)
Dept: CARDIOLOGY CLINIC | Age: 53
End: 2018-12-19

## 2018-12-19 VITALS
HEART RATE: 80 BPM | SYSTOLIC BLOOD PRESSURE: 224 MMHG | BODY MASS INDEX: 23.17 KG/M2 | WEIGHT: 135 LBS | DIASTOLIC BLOOD PRESSURE: 119 MMHG | OXYGEN SATURATION: 99 %

## 2018-12-19 DIAGNOSIS — I63.9 ACUTE CEREBRAL INFARCTION (HCC): ICD-10-CM

## 2018-12-19 DIAGNOSIS — I10 ESSENTIAL HYPERTENSION WITH GOAL BLOOD PRESSURE LESS THAN 140/90: Primary | Chronic | ICD-10-CM

## 2018-12-19 NOTE — PROGRESS NOTES
1. Have you been to the ER, urgent care clinic since your last visit? Hospitalized since your last visit? No     2. Have you seen or consulted any other health care providers outside of the [address] [place] [address] since your last visit? Include any pap smears or colon screening.   No

## 2019-01-05 NOTE — PROGRESS NOTES
Subjective:  
   Crystals in the office today for cardiac evaluation. She is a 51-year-old woman that has had hypertension since 2007. She was first diagnosed when she was incarcerated at that time. She has been taking medications regularly since that date. She is referred for difficult to control blood pressure. The patient reports no chest pain or shortness of breath. She had no PND or orthopnea. She has had no palpitations, near syncope or syncope. Her main complaints are that of being tired and \"always cold\". Patient was noted to be quite anemic on recent CBC. Her hemoglobin and hematocrit were 8.7/29.2. MCV was 69.2. Iron was 20. TIBC was 402. Iron saturation was 5%. T IBC was 422 The patient does report some heavy menses at times. She has had no blood per stool. She denies black stools. She has no history of peptic ulcer disease. She has not had epigastric discomfort. She has not followed up with GYN as of yet. In the office today, she reports she is under a lot of stress. She is being evicted from her apartment. She has not taken her blood pressure medicines today. Patient Active Problem List  
 Diagnosis Date Noted  Iron deficiency anemia due to chronic blood loss 11/05/2018  Lumbosacral spondylosis without myelopathy 08/09/2018  DDD (degenerative disc disease), lumbar 08/09/2018  Lumbar neuritis 08/09/2018  Scoliosis of lumbar spine 08/09/2018  Essential hypertension with goal blood pressure less than 140/90 04/18/2018  Anxiety 04/18/2018  Numbness and tingling 08/01/2016  Acute cerebral infarction ([facility] [address] 08/01/2016  Back pain 05/15/2014 Current Outpatient Medications Medication Sig Dispense Refill  cyclobenzaprine (FLEXERIL) 5 mg tablet Take 1 Tab by mouth nightly as needed for Muscle Spasm(s). 30 Tab 0  chlorthalidone (HYGROTEN) 25 mg tablet Take 1 Tab by mouth daily. 30 Tab 6  lisinopril (PRINIVIL, ZESTRIL) 40 mg tablet Take 1 Tab by mouth daily. 90 Tab 0  
 amLODIPine (NORVASC) 10 mg tablet Take 1 Tab by mouth daily. Indications: hypertension 90 Tab 0  
 metoprolol tartrate (LOPRESSOR) 100 mg IR tablet Take 1 Tab by mouth two (2) times a day. 180 Tab 0  
 diazePAM (VALIUM) 5 mg tablet Take 1 Tab by mouth every eight (8) hours as needed (muscle spasm). Max Daily Amount: 15 mg. 20 Tab 0  
 ferrous sulfate (IRON) 325 mg (65 mg iron) EC tablet Take 1 Tab by mouth three (3) times daily (with meals). 30 Tab 0  
 spironolactone (ALDACTONE) 25 mg tablet Take 1 Tab by mouth daily. 90 Tab 0  
 hydrALAZINE (APRESOLINE) 100 mg tablet Take 1 Tab by mouth three (3) times daily. 90 Tab 0  
 topiramate (TOPAMAX) 50 mg tablet Take 1 Tab by mouth two (2) times a day. 60 Tab 3 No Known Allergies Past Medical History:  
Diagnosis Date  Abnormal mammogram 3/2016 BIRADS 3 right breast  
 Hypertension  Stroke ([facility] [address] 2016 CVA Past Surgical History:  
Procedure Laterality Date [facility]  
  Family History Problem Relation Age of Onset  No Known Problems Mother  No Known Problems Father  No Known Problems Sister  No Known Problems Brother  No Known Problems Maternal Aunt  No Known Problems Maternal Uncle  No Known Problems Paternal Aunt  No Known Problems Paternal Uncle  No Known Problems Maternal Grandmother  No Known Problems Paternal Grandmother  No Known Problems Paternal Grandfather  No Known Problems Other Social History Tobacco Use Smoking Status Former Smoker  Last attempt to quit: 1999  Years since quittin.1 Smokeless Tobacco Never Used Review of Systems, additional: 
Constitutional: negative Eyes: negative Respiratory: negative Cardiovascular: positive for fatigue Gastrointestinal: negative Musculoskeletal:negative Neurological: negative Behvioral/Psych: negative Endocrine: negative ENT: negative Objective:  
 
Visit Vitals BP (!) 224/119 Pulse 80 Wt 135 lb (61.2 kg) LMP 2018 SpO2 99% BMI 23.17 kg/m² General:  alert, cooperative, no distress Chest Wall: inspection normal - no chest wall deformities or tenderness, respiratory effort normal  
Lung: clear to auscultation bilaterally Heart:  normal rate and regular rhythm, S1 and S2 normal, no murmurs noted, no gallops noted, no JVD Abdomen: soft, non-tender. Bowel sounds normal. No masses,  no organomegaly Extremities: extremities normal, atraumatic, no cyanosis or edema Skin: no rashes Neuro: alert, oriented, normal speech, no focal findings or movement disorder noted [id number]. Sinus bradycardia. LVH by voltage criteria. Assessment/Plan: ICD-10-CM ICD-9-CM 1. Anemia, unspecified type, will refer to gastroenterology. The patient has never had screening colonoscopy. She also needs to set up an appointment with her GYN physician. D64.9 285.9 REFERRAL TO GASTROENTEROLOGY ALDOSTERONE/RENIN RATIO 2. Essential hypertension with goal blood pressure less than 140/90, severely elevated in the office today. Asymptomatic. Patient has once again not taken her medications today. She will return next week for a blood pressure check. Return in 6 weeks. I10 401.9 3. Acute cerebral infarction I63.9 434.91   
4. Iron deficiency anemia due to chronic blood loss, needs to follow-up with GYN and GI medicine D50.0 280.0

## 2019-01-11 ENCOUNTER — PATIENT OUTREACH (OUTPATIENT)
Dept: FAMILY MEDICINE CLINIC | Age: 54
End: 2019-01-11

## 2019-01-11 NOTE — PROGRESS NOTES
NN health screenings:    No indication CRC screening has been completed. Closed this episode of care.

## 2019-05-30 DIAGNOSIS — M54.50 CHRONIC BILATERAL LOW BACK PAIN WITHOUT SCIATICA: ICD-10-CM

## 2019-05-30 DIAGNOSIS — I10 UNCONTROLLED HYPERTENSION: ICD-10-CM

## 2019-05-30 DIAGNOSIS — R10.9 FLANK PAIN, ACUTE: ICD-10-CM

## 2019-05-30 DIAGNOSIS — G89.29 CHRONIC BILATERAL LOW BACK PAIN WITHOUT SCIATICA: ICD-10-CM

## 2019-05-30 RX ORDER — METOPROLOL TARTRATE 100 MG/1
100 TABLET ORAL 2 TIMES DAILY
Qty: 60 TAB | Refills: 0 | Status: SHIPPED | OUTPATIENT
Start: 2019-05-30 | End: 2019-06-10 | Stop reason: SDUPTHER

## 2019-05-30 RX ORDER — LISINOPRIL 40 MG/1
40 TABLET ORAL DAILY
Qty: 30 TAB | Refills: 0 | Status: SHIPPED | OUTPATIENT
Start: 2019-05-30 | End: 2019-06-10 | Stop reason: SDUPTHER

## 2019-05-30 RX ORDER — AMLODIPINE BESYLATE 10 MG/1
10 TABLET ORAL DAILY
Qty: 30 TAB | Refills: 0 | Status: SHIPPED | OUTPATIENT
Start: 2019-05-30 | End: 2019-06-10 | Stop reason: SDUPTHER

## 2019-05-30 RX ORDER — CYCLOBENZAPRINE HCL 5 MG
5 TABLET ORAL
Qty: 10 TAB | Refills: 0 | Status: SHIPPED | OUTPATIENT
Start: 2019-05-30 | End: 2019-06-10 | Stop reason: ALTCHOICE

## 2019-05-30 NOTE — TELEPHONE ENCOUNTER
This patient contacted office for the following prescriptions to be filled:    Medication requested :   Requested Prescriptions     Pending Prescriptions Disp Refills    amLODIPine (NORVASC) 10 mg tablet 90 Tab 0     Sig: Take 1 Tab by mouth daily. Indications: high blood pressure    lisinopril (PRINIVIL, ZESTRIL) 40 mg tablet 90 Tab 0     Sig: Take 1 Tab by mouth daily.  metoprolol tartrate (LOPRESSOR) 100 mg IR tablet 180 Tab 0     Sig: Take 1 Tab by mouth two (2) times a day.  cyclobenzaprine (FLEXERIL) 5 mg tablet 30 Tab 0     Sig: Take 1 Tab by mouth nightly as needed for Muscle Spasm(s). PCP: Osorio or Print:  [facility]   Mail order or Local pharmacy [id number] [facility]     Scheduled appointment if not seen by current providers in office:  LOV 12/11/2018 f/u 6/10/2019 pt states that she is out of her medications.

## 2019-06-10 ENCOUNTER — OFFICE VISIT (OUTPATIENT)
Dept: FAMILY MEDICINE CLINIC | Age: 54
End: 2019-06-10

## 2019-06-10 VITALS
BODY MASS INDEX: 21.41 KG/M2 | SYSTOLIC BLOOD PRESSURE: 140 MMHG | HEART RATE: 52 BPM | WEIGHT: 125.4 LBS | DIASTOLIC BLOOD PRESSURE: 70 MMHG | HEIGHT: 64 IN | TEMPERATURE: 98.2 F | RESPIRATION RATE: 16 BRPM | OXYGEN SATURATION: 99 %

## 2019-06-10 DIAGNOSIS — Z12.11 SCREENING FOR COLON CANCER: ICD-10-CM

## 2019-06-10 DIAGNOSIS — M54.50 CHRONIC BILATERAL LOW BACK PAIN WITHOUT SCIATICA: ICD-10-CM

## 2019-06-10 DIAGNOSIS — Z12.39 SCREENING FOR BREAST CANCER: ICD-10-CM

## 2019-06-10 DIAGNOSIS — I27.20 PULMONARY HYPERTENSION (HCC): ICD-10-CM

## 2019-06-10 DIAGNOSIS — G43.709 CHRONIC MIGRAINE WITHOUT AURA WITHOUT STATUS MIGRAINOSUS, NOT INTRACTABLE: ICD-10-CM

## 2019-06-10 DIAGNOSIS — G89.29 CHRONIC BILATERAL LOW BACK PAIN WITHOUT SCIATICA: ICD-10-CM

## 2019-06-10 DIAGNOSIS — D50.9 IRON DEFICIENCY ANEMIA, UNSPECIFIED IRON DEFICIENCY ANEMIA TYPE: ICD-10-CM

## 2019-06-10 DIAGNOSIS — I10 ESSENTIAL HYPERTENSION: Primary | ICD-10-CM

## 2019-06-10 RX ORDER — LISINOPRIL 40 MG/1
40 TABLET ORAL DAILY
Qty: 90 TAB | Refills: 0 | Status: SHIPPED | OUTPATIENT
Start: 2019-06-10 | End: 2019-11-08 | Stop reason: SDUPTHER

## 2019-06-10 RX ORDER — TOPIRAMATE 50 MG/1
50 TABLET, FILM COATED ORAL 2 TIMES DAILY
Qty: 180 TAB | Refills: 0 | Status: SHIPPED | OUTPATIENT
Start: 2019-06-10 | End: 2020-07-07

## 2019-06-10 RX ORDER — SPIRONOLACTONE 25 MG/1
25 TABLET ORAL DAILY
Qty: 90 TAB | Refills: 0 | Status: SHIPPED | OUTPATIENT
Start: 2019-06-10 | End: 2019-11-08 | Stop reason: SDUPTHER

## 2019-06-10 RX ORDER — IBUPROFEN 800 MG/1
1600 TABLET ORAL AS NEEDED
COMMUNITY
End: 2019-11-21 | Stop reason: SDUPTHER

## 2019-06-10 RX ORDER — METOPROLOL TARTRATE 100 MG/1
100 TABLET ORAL 2 TIMES DAILY
Qty: 180 TAB | Refills: 0 | Status: SHIPPED | OUTPATIENT
Start: 2019-06-10 | End: 2019-11-21 | Stop reason: SDUPTHER

## 2019-06-10 RX ORDER — CYCLOBENZAPRINE HCL 10 MG
10 TABLET ORAL
Qty: 30 TAB | Refills: 0 | Status: SHIPPED | OUTPATIENT
Start: 2019-06-10 | End: 2019-11-21 | Stop reason: SDUPTHER

## 2019-06-10 RX ORDER — HYDRALAZINE HYDROCHLORIDE 100 MG/1
100 TABLET, FILM COATED ORAL 3 TIMES DAILY
Qty: 90 TAB | Refills: 2 | Status: SHIPPED | OUTPATIENT
Start: 2019-06-10 | End: 2021-12-28 | Stop reason: SDUPTHER

## 2019-06-10 RX ORDER — CHLORTHALIDONE 25 MG/1
25 TABLET ORAL DAILY
Qty: 90 TAB | Refills: 0 | Status: SHIPPED | OUTPATIENT
Start: 2019-06-10 | End: 2021-12-28 | Stop reason: SDUPTHER

## 2019-06-10 RX ORDER — AMLODIPINE BESYLATE 10 MG/1
10 TABLET ORAL DAILY
Qty: 90 TAB | Refills: 0 | Status: SHIPPED | OUTPATIENT
Start: 2019-06-10 | End: 2019-11-08 | Stop reason: SDUPTHER

## 2019-06-10 NOTE — PATIENT INSTRUCTIONS
Low Sodium Diet (2,000 Milligram): Care Instructions  Your Care Instructions    Too much sodium causes your body to hold on to extra water. This can raise your blood pressure and force your heart and kidneys to work harder. In very serious cases, this could cause you to be put in the hospital. It might even be life-threatening. By limiting sodium, you will feel better and lower your risk of serious problems. The most common source of sodium is salt. People get most of the salt in their diet from canned, prepared, and packaged foods. Fast food and restaurant meals also are very high in sodium. Your doctor will probably limit your sodium to less than 2,000 milligrams (mg) a day. This limit counts all the sodium in prepared and packaged foods and any salt you add to your food. Follow-up care is a key part of your treatment and safety. Be sure to make and go to all appointments, and call your doctor if you are having problems. It's also a good idea to know your test results and keep a list of the medicines you take. How can you care for yourself at home? Read food labels  · Read labels on cans and food packages. The labels tell you how much sodium is in each serving. Make sure that you look at the serving size. If you eat more than the serving size, you have eaten more sodium. · Food labels also tell you the Percent Daily Value for sodium. Choose products with low Percent Daily Values for sodium. · Be aware that sodium can come in forms other than salt, including monosodium glutamate (MSG), sodium citrate, and sodium bicarbonate (baking soda). MSG is often added to Asian food. When you eat out, you can sometimes ask for food without MSG or added salt. Buy low-sodium foods  · Buy foods that are labeled \"unsalted\" (no salt added), \"sodium-free\" (less than 5 mg of sodium per serving), or \"low-sodium\" (less than 140 mg of sodium per serving).  Foods labeled \"reduced-sodium\" and \"light sodium\" may still have too much sodium. Be sure to read the label to see how much sodium you are getting. · Buy fresh vegetables, or frozen vegetables without added sauces. Buy low-sodium versions of canned vegetables, soups, and other canned goods. Prepare low-sodium meals  · Cut back on the amount of salt you use in cooking. This will help you adjust to the taste. Do not add salt after cooking. One teaspoon of salt has about 2,300 mg of sodium. · Take the salt shaker off the table. · Flavor your food with garlic, lemon juice, onion, vinegar, herbs, and spices. Do not use soy sauce, lite soy sauce, steak sauce, onion salt, garlic salt, celery salt, mustard, or ketchup on your food. · Use low-sodium salad dressings, sauces, and ketchup. Or make your own salad dressings and sauces without adding salt. · Use less salt (or none) when recipes call for it. You can often use half the salt a recipe calls for without losing flavor. Other foods such as rice, pasta, and grains do not need added salt. · Rinse canned vegetables, and cook them in fresh water. This removes some--but not all--of the salt. · Avoid water that is naturally high in sodium or that has been treated with water softeners, which add sodium. Call your local water company to find out the sodium content of your water supply. If you buy bottled water, read the label and choose a sodium-free brand. Avoid high-sodium foods  · Avoid eating:  ? Smoked, cured, salted, and canned meat, fish, and poultry. ? Ham, murry, hot dogs, and luncheon meats. ? Regular, hard, and processed cheese and regular peanut butter. ? Crackers with salted tops, and other salted snack foods such as pretzels, chips, and salted popcorn. ? Frozen prepared meals, unless labeled low-sodium. ? Canned and dried soups, broths, and bouillon, unless labeled sodium-free or low-sodium. ? Canned vegetables, unless labeled sodium-free or low-sodium. ? Western Annette fries, pizza, tacos, and other fast foods.   ? Vandana Whiting olives, ketchup, and other condiments, especially soy sauce, unless labeled sodium-free or low-sodium. Where can you learn more? Go to http://collette-grecia.info/. Enter A205 in the search box to learn more about \"Low Sodium Diet (2,000 Milligram): Care Instructions. \"  Current as of: March 28, 2018  Content Version: 11.9  © 2782-7556 [employer]. Care instructions adapted under license by [employer] (which disclaims liability or warranty for this information). If you have questions about a medical condition or this instruction, always ask your healthcare professional. [address] any warranty or liability for your use of this information.

## 2019-06-10 NOTE — PROGRESS NOTES
1. Have you been to the ER, urgent care clinic since your last visit? Hospitalized since your last visit? No    2. Have you seen or consulted any other health care providers outside of the [address] [place] [address] since your last visit? Include any pap smears or colon screening.  No    Chief Complaint   Patient presents with    Hypertension    LOW BACK PAIN     chronic low back pain    Medication Evaluation     wants to discuss increasing dose for Flexeril - 5mg does not help with muscle spasm
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
## 2019-11-08 ENCOUNTER — OFFICE VISIT (OUTPATIENT)
Dept: FAMILY MEDICINE CLINIC | Age: 54
End: 2019-11-08

## 2019-11-08 ENCOUNTER — HOSPITAL ENCOUNTER (OUTPATIENT)
Dept: LAB | Age: 54
Discharge: HOME | End: 2019-11-08
Payer: MEDICAID

## 2019-11-08 VITALS
HEART RATE: 70 BPM | RESPIRATION RATE: 16 BRPM | WEIGHT: 123.4 LBS | SYSTOLIC BLOOD PRESSURE: 209 MMHG | HEIGHT: 64 IN | BODY MASS INDEX: 21.07 KG/M2 | TEMPERATURE: 98.2 F | DIASTOLIC BLOOD PRESSURE: 133 MMHG | OXYGEN SATURATION: 100 %

## 2019-11-08 DIAGNOSIS — J06.9 URI, ACUTE: ICD-10-CM

## 2019-11-08 DIAGNOSIS — J04.0 LARYNGITIS: Primary | ICD-10-CM

## 2019-11-08 DIAGNOSIS — H10.022 OTHER MUCOPURULENT CONJUNCTIVITIS OF LEFT EYE: ICD-10-CM

## 2019-11-08 DIAGNOSIS — I10 ESSENTIAL HYPERTENSION: ICD-10-CM

## 2019-11-08 PROBLEM — H10.9 CONJUNCTIVITIS: Status: ACTIVE | Noted: 2019-11-08

## 2019-11-08 LAB
ALBUMIN SERPL-MCNC: 3.6 G/DL (ref 3.4–5)
ALBUMIN/GLOB SERPL: 0.9 {RATIO} (ref 0.8–1.7)
ALP SERPL-CCNC: 75 U/L (ref 45–117)
ALT SERPL-CCNC: 14 U/L (ref 13–56)
ANION GAP SERPL CALC-SCNC: 6 MMOL/L (ref 3–18)
AST SERPL-CCNC: 14 U/L (ref 10–38)
BASOPHILS # BLD: 0 K/UL (ref 0–0.1)
BASOPHILS NFR BLD: 1 % (ref 0–2)
BILIRUB SERPL-MCNC: 0.3 MG/DL (ref 0.2–1)
BUN SERPL-MCNC: 6 MG/DL (ref 7–18)
BUN/CREAT SERPL: 12 (ref 12–20)
CALCIUM SERPL-MCNC: 8.5 MG/DL (ref 8.5–10.1)
CHLORIDE SERPL-SCNC: 106 MMOL/L (ref 100–111)
CO2 SERPL-SCNC: 29 MMOL/L (ref 21–32)
CREAT SERPL-MCNC: 0.51 MG/DL (ref 0.6–1.3)
DIFFERENTIAL METHOD BLD: ABNORMAL
EOSINOPHIL # BLD: 0.1 K/UL (ref 0–0.4)
EOSINOPHIL NFR BLD: 2 % (ref 0–5)
ERYTHROCYTE [DISTWIDTH] IN BLOOD BY AUTOMATED COUNT: 15.7 % (ref 11.6–14.5)
GLOBULIN SER CALC-MCNC: 3.8 G/DL (ref 2–4)
GLUCOSE SERPL-MCNC: 84 MG/DL (ref 74–99)
HCT VFR BLD AUTO: 33 % (ref 35–45)
HGB BLD-MCNC: 9.9 G/DL (ref 12–16)
LYMPHOCYTES # BLD: 1.2 K/UL (ref 0.9–3.6)
LYMPHOCYTES NFR BLD: 27 % (ref 21–52)
MCH RBC QN AUTO: 22.9 PG (ref 24–34)
MCHC RBC AUTO-ENTMCNC: 30 G/DL (ref 31–37)
MCV RBC AUTO: 76.2 FL (ref 74–97)
MONOCYTES # BLD: 0.4 K/UL (ref 0.05–1.2)
MONOCYTES NFR BLD: 9 % (ref 3–10)
NEUTS SEG # BLD: 2.7 K/UL (ref 1.8–8)
NEUTS SEG NFR BLD: 61 % (ref 40–73)
PLATELET # BLD AUTO: 286 K/UL (ref 135–420)
PMV BLD AUTO: 11.2 FL (ref 9.2–11.8)
POTASSIUM SERPL-SCNC: 3.3 MMOL/L (ref 3.5–5.5)
PROT SERPL-MCNC: 7.4 G/DL (ref 6.4–8.2)
RBC # BLD AUTO: 4.33 M/UL (ref 4.2–5.3)
SODIUM SERPL-SCNC: 141 MMOL/L (ref 136–145)
WBC # BLD AUTO: 4.5 K/UL (ref 4.6–13.2)

## 2019-11-08 PROCEDURE — 80053 COMPREHEN METABOLIC PANEL: CPT

## 2019-11-08 PROCEDURE — 85025 COMPLETE CBC W/AUTO DIFF WBC: CPT

## 2019-11-08 PROCEDURE — 36415 COLL VENOUS BLD VENIPUNCTURE: CPT

## 2019-11-08 RX ORDER — AMLODIPINE BESYLATE 10 MG/1
10 TABLET ORAL DAILY
Qty: 30 TAB | Refills: 0 | Status: SHIPPED | OUTPATIENT
Start: 2019-11-08 | End: 2019-11-21 | Stop reason: SDUPTHER

## 2019-11-08 RX ORDER — SPIRONOLACTONE 25 MG/1
25 TABLET ORAL DAILY
Qty: 30 TAB | Refills: 0 | Status: SHIPPED | OUTPATIENT
Start: 2019-11-08 | End: 2020-07-07

## 2019-11-08 RX ORDER — BENZONATATE 200 MG/1
200 CAPSULE ORAL
Qty: 30 CAP | Refills: 0 | Status: SHIPPED | OUTPATIENT
Start: 2019-11-08 | End: 2019-11-18

## 2019-11-08 RX ORDER — LISINOPRIL 40 MG/1
40 TABLET ORAL DAILY
Qty: 30 TAB | Refills: 0 | Status: SHIPPED | OUTPATIENT
Start: 2019-11-08 | End: 2019-11-21 | Stop reason: SDUPTHER

## 2019-11-08 RX ORDER — POLYMYXIN B SULFATE AND TRIMETHOPRIM 1; 10000 MG/ML; [USP'U]/ML
1 SOLUTION OPHTHALMIC EVERY 6 HOURS
Qty: 1 BOTTLE | Refills: 0 | Status: SHIPPED | OUTPATIENT
Start: 2019-11-08 | End: 2019-11-15

## 2019-11-08 NOTE — PROGRESS NOTES
Viji Fernandes presents today for   Chief Complaint   Patient presents with    Laryngitis    Eye Problem     congestion, left eye. patient states eye is sore. Kim Ramirez preferred language for health care discussion is english/other. Is someone accompanying this pt? no    Is the patient using any DME equipment during [address]? no    Depression Screening:  3 most recent PHQ Screens 12/19/2018   Little interest or pleasure in doing things Not at all   Feeling down, depressed, irritable, or hopeless Not at all   Total Score PHQ 2 0       Learning Assessment:  Learning Assessment 4/30/2018   PRIMARY LEARNER Patient   HIGHEST LEVEL OF EDUCATION - PRIMARY LEARNER  2 YEARS [facility] PRIMARY LEARNER NONE   CO-LEARNER CAREGIVER No   PRIMARY LANGUAGE ENGLISH   LEARNER PREFERENCE PRIMARY LISTENING     -   ANSWERED BY patient   RELATIONSHIP SELF       Abuse Screening:  Abuse Screening Questionnaire 4/18/2018   Do you ever feel afraid of your partner? N   Are you in a relationship with someone who physically or mentally threatens you? N   Is it safe for you to go home? Y       Generalized Anxiety  No flowsheet data found. Health Maintenance Due   Topic Date Due    DTaP/Tdap/Td series (1 - Tdap) 07/27/1986    Shingrix Vaccine Age 50> (1 of 2) 07/27/2015    FOBT Q 1 YEAR AGE 50-75  03/02/2017    BREAST CANCER SCRN MAMMOGRAM  06/09/2019   . Health Maintenance reviewed and discussed and ordered per Provider.

## 2019-11-08 NOTE — PROGRESS NOTES
HPI  Pt of [facility]. Presents with cold symptoms  x 5 days. Is very hoarse    Also woke up today with Left eye matted shut and covered in mucus. Feels like there is sand in her eye. No visual changes. Denies that she got something in it. Wasn't rubbing it. Mucous /watery discharge. No photophobia    Patient has a history of hypertension. BP very high today. Hasn't been taking her medication for about a week. Says that she has been very stressed taking care of everybody else, and not focusing on herself takes several medications and needs some of them refilled. Denies headaches, chest pain, palpitations    Past Medical History  Past Medical History:   Diagnosis Date    Abnormal mammogram 3/2016    BIRADS 3 right breast    Hypertension     Stroke [facility]) 2016    CVA       Surgical History  Past Surgical History:   Procedure Laterality Date    HX GYN               Medications  Current Outpatient Medications   Medication Sig Dispense Refill    amLODIPine (NORVASC) 10 mg tablet Take 1 Tab by mouth daily. Indications: high blood pressure 30 Tab 0    lisinopril (PRINIVIL, ZESTRIL) 40 mg tablet Take 1 Tab by mouth daily. 30 Tab 0    spironolactone (ALDACTONE) 25 mg tablet Take 1 Tab by mouth daily. 30 Tab 0    benzonatate (TESSALON) 200 mg capsule Take 1 Cap by mouth three (3) times daily as needed for Cough for up to 10 days. 30 Cap 0    trimethoprim-polymyxin b (POLYTRIM) ophthalmic solution Administer 1 Drop to left eye every six (6) hours for 7 days. 1 Bottle 0    ibuprofen (MOTRIN) 800 mg tablet Take 1,600 mg by mouth as needed for Pain.  chlorthalidone (HYGROTEN) 25 mg tablet Take 1 Tab by mouth daily. 90 Tab 0    hydrALAZINE (APRESOLINE) 100 mg tablet Take 1 Tab by mouth three (3) times daily. 90 Tab 2    metoprolol tartrate (LOPRESSOR) 100 mg IR tablet Take 1 Tab by mouth two (2) times a day.  180 Tab 0    cyclobenzaprine (FLEXERIL) 10 mg tablet Take 1 Tab by mouth nightly as needed for Muscle Spasm(s). 30 Tab 0    ferrous sulfate (IRON) 325 mg (65 mg iron) EC tablet Take 1 Tab by mouth three (3) times daily (with meals). 30 Tab 0    topiramate (TOPAMAX) 50 mg tablet Take 1 Tab by mouth two (2) times a day.  180 Tab 0       Allergies  No Known Allergies    Family History  Family History   Problem Relation Age of Onset    No Known Problems Mother     No Known Problems Father     No Known Problems Sister     No Known Problems Brother     No Known Problems Maternal Aunt     No Known Problems Maternal Uncle     No Known Problems Paternal Aunt     No Known Problems Paternal Uncle     No Known Problems Maternal Grandmother     No Known Problems Paternal Grandmother     No Known Problems Paternal Grandfather     No Known Problems Other        Social History  Social History     Socioeconomic History    Marital status:      Spouse name: Not on file    Number of children: Not on file    Years of education: Not on file    Highest education level: Not on file   Occupational History    Not on file   Social Needs    Financial resource strain: Not on file    Food insecurity:     Worry: Not on file     Inability: Not on file    Transportation needs:     Medical: Not on file     Non-medical: Not on file   Tobacco Use    Smoking status: Former Smoker     Last attempt to quit: 1999     Years since quittin.9    Smokeless tobacco: Never Used   Substance and Sexual Activity    Alcohol use: No    Drug use: No    Sexual activity: Yes     Partners: Male     Birth control/protection: Condom   Lifestyle    Physical activity:     Days per week: Not on file     Minutes per session: Not on file    Stress: Not on file   Relationships    Social connections:     Talks on phone: Not on file     Gets together: Not on file     Attends Mosque service: Not on file     Active member of club or organization: Not on file     Attends meetings of clubs or organizations: Not on file     Relationship status: Not on file    Intimate partner violence:     Fear of current or ex partner: Not on file     Emotionally abused: Not on file     Physically abused: Not on file     Forced sexual activity: Not on file   Other Topics Concern    Not on file   Social History Narrative    Not on file       Problem List  Patient Active Problem List   Diagnosis Code    Back pain M54.9    Numbness and tingling R20.0, R20.2    Acute cerebral infarction ([facility] [address] I63.9    Essential hypertension with goal blood pressure less than 140/90 I10    Anxiety F41.9    Lumbosacral spondylosis without myelopathy M47.817    DDD (degenerative disc disease), lumbar M51.36    Lumbar neuritis M54.16    Scoliosis of lumbar spine M41.9    Iron deficiency anemia due to chronic blood loss D50.0    Laryngitis J04.0    Conjunctivitis H10.9    Essential hypertension I10    URI, acute J06.9       Review of Systems  Review of Systems   Constitutional: Positive for chills. Negative for fever. HENT: Positive for sore throat. Runny nose.  hoarse   Eyes: Positive for discharge. Respiratory: Positive for cough and sputum production. Green mucus   Musculoskeletal: Positive for myalgias. Neurological: Negative for dizziness. Vital Signs  Vitals:    11/08/19 1118 11/08/19 1123   BP: (!) 217/128 (!) 209/133   Pulse: 70    Resp: 16    Temp: 98.2 °F (36.8 °C)    TempSrc: Oral    SpO2: 100%    Weight: 123 lb 6.4 oz (56 kg)    Height: 5' 4\" (1.626 m)    PainSc:   5    PainLoc: Eye        Physical Exam  Physical Exam   Constitutional: She is oriented to person, place, and time. She appears well-developed and well-nourished. HENT:   Right Ear: Tympanic membrane, external ear and ear canal normal.   Left Ear: Tympanic membrane, external ear and ear canal normal.   Nose: Rhinorrhea present. No mucosal edema. Right sinus exhibits no maxillary sinus tenderness and no frontal sinus tenderness. Left sinus exhibits no maxillary sinus tenderness and no frontal sinus tenderness. Mouth/Throat: Uvula is midline, oropharynx is clear and moist and mucous membranes are normal.   Eyes: Pupils are equal, round, and reactive to light. EOM and lids are normal. Left eye exhibits chemosis and exudate. Left conjunctiva is injected. Neck: Normal range of motion. Neck supple. Cardiovascular: Normal rate, regular rhythm and normal heart sounds. Pulmonary/Chest: Effort normal and breath sounds normal. No respiratory distress. She has no wheezes. Lymphadenopathy:     She has no cervical adenopathy. Neurological: She is alert and oriented to person, place, and time. Skin: Skin is warm and dry. Psychiatric: She has a normal mood and affect. Nursing note and vitals reviewed. Diagnostics  Orders Placed This Encounter    CBC WITH AUTOMATED DIFF     Standing Status:   Future     Number of Occurrences:   1     Standing Expiration Date:   73/4/5528    METABOLIC PANEL, COMPREHENSIVE     Standing Status:   Future     Number of Occurrences:   1     Standing Expiration Date:   11/8/2020    amLODIPine (NORVASC) 10 mg tablet     Sig: Take 1 Tab by mouth daily. Indications: high blood pressure     Dispense:  30 Tab     Refill:  0    lisinopril (PRINIVIL, ZESTRIL) 40 mg tablet     Sig: Take 1 Tab by mouth daily. Dispense:  30 Tab     Refill:  0    spironolactone (ALDACTONE) 25 mg tablet     Sig: Take 1 Tab by mouth daily. Dispense:  30 Tab     Refill:  0    benzonatate (TESSALON) 200 mg capsule     Sig: Take 1 Cap by mouth three (3) times daily as needed for Cough for up to 10 days. Dispense:  30 Cap     Refill:  0    trimethoprim-polymyxin b (POLYTRIM) ophthalmic solution     Sig: Administer 1 Drop to left eye every six (6) hours for 7 days.      Dispense:  1 Bottle     Refill:  0       Results  Results for orders placed or performed during the hospital encounter of 10/03/18   DUPLEX RENAL ART/COREY BILATERAL   Result Value Ref Range    Right kidney length 9.65 cm    Right kidney width 4.00 cm    Right renal origin sys 130.5 cm/s    Right renal prox sys 114.6 cm/s    Right renal mid sys 138.8 cm/s    Right renal dist sys 84.0 cm/s    Right renal origin rar 1.34     Right renal prox rar 1.18     Right renal mid rar 1.42     Right renal dist rar 0.86     Right renal middle parenchyma max 23.0 cm/s    Right renal middle parenchyma min 7.5 cm/s    Right renal middle parenchyma RI 0.67     Left renal origin sys 126.4 cm/s    Left renal prox sys 131.4 cm/s    Left renal mid sys 119.0 cm/s    Left renal dist sys 87.4 cm/s    Left renal origin rar 1.30     Left renal prox rar 1.35     Left renal mid rar 1.22     Left renal dist rar 0.90     Left renal middle parenchyma max 13.7 cm/s    Left renal middle parenchyma min 4.4 cm/s    Left renal middle parenchyma RI 0.68     Abdominal prox aorta valorie 97.5 cm/s    Left Kidney Arcuate Artery Medial PSV 18.1 cm/s    Left Kidney Arcuate Artery Medial EDV 7.2 cm/s    Right Kidney Arcuate Artery Superior PSV 17.4 cm/s    Right Kidney Arcuate Artery Superior EDV 5.6 cm/s    Right Superior Arcuate RI 0.68     Left Medial Arcuate RI 0.60      Assessment and Plan  Diagnoses and all orders for this visit:    1. Laryngitis  -     benzonatate (TESSALON) 200 mg capsule; Take 1 Cap by mouth three (3) times daily as needed for Cough for up to 10 days. Discussed that symptoms were likely viral in nature. Patient encouraged to increase fluid intake, gargle with salt water, rest her voice cough drops    2. URI, acute  Tessalon as directed, saline nasal spray, increase fluid intake, monitor for fever    3. Other mucopurulent conjunctivitis of left eye  -     trimethoprim-polymyxin b (POLYTRIM) ophthalmic solution; Administer 1 Drop to left eye every six (6) hours for 7 days.   Discussed eyedrop discussed contagious precautions, do not rub eye, follow-up if patient develops pain or vision changes    4. Essential hypertension  -     amLODIPine (NORVASC) 10 mg tablet; Take 1 Tab by mouth daily. Indications: high blood pressure  -     lisinopril (PRINIVIL, ZESTRIL) 40 mg tablet; Take 1 Tab by mouth daily. -     spironolactone (ALDACTONE) 25 mg tablet; Take 1 Tab by mouth daily.  -     CBC WITH AUTOMATED DIFF; Future  -     METABOLIC PANEL, COMPREHENSIVE; Future  Discussed with patient that her blood pressure was dangerously high at this time. Reviewed importance of taking medications as prescribed. Will give patient 1 month refills on the medications that she says that she is out of. She was strongly encouraged to take all of the medications that were previously prescribed to her. Encouraged her to go and get basic lab work drawn today since she has not had lab work done in a while. Patient was encouraged to follow-up for reevaluation of her blood pressure in 1 week [facility] Reviewed emergency precautions      After care summary printed and reviewed with patient. Plan reviewed with patient. Questions answered. Patient verbalized understanding of plan and is in agreement with plan. Patient to follow up in one week or earlier if symptoms worsen. Encouraged the use of my chart.     JUDE Martinez

## 2019-11-09 ENCOUNTER — TELEPHONE (OUTPATIENT)
Dept: FAMILY MEDICINE CLINIC | Age: 54
End: 2019-11-09

## 2019-11-09 NOTE — TELEPHONE ENCOUNTER
Telephone call from Daniel Rogers [address] from [address] [place] [address] regarding filling spironolactone and lisinopril concurrently as prescribed. Lab Results   Component Value Date/Time    Potassium 3.3 (L) 11/08/2019 12:44 PM        Prescription approved as prescribed by Josué Remy [address] .       Mi Kat DNP, FNP-BC
contact guard

## 2019-11-11 ENCOUNTER — TELEPHONE (OUTPATIENT)
Dept: FAMILY MEDICINE CLINIC | Age: 54
End: 2019-11-11

## 2019-11-12 ENCOUNTER — TELEPHONE (OUTPATIENT)
Dept: FAMILY MEDICINE CLINIC | Age: 54
End: 2019-11-12

## 2019-11-12 NOTE — TELEPHONE ENCOUNTER
Called pt to review lab results with her. She reports that she has been checking her blood pressure but it is still running high 201/107. Strongly encouraged her to follow up with PCP as well as cardiology ASAP. Reviewed emergency precautions.   She verbalized understanding and said that she would do so

## 2019-11-21 ENCOUNTER — OFFICE VISIT (OUTPATIENT)
Dept: FAMILY MEDICINE CLINIC | Age: 54
End: 2019-11-21

## 2019-11-21 VITALS
RESPIRATION RATE: 16 BRPM | HEIGHT: 64 IN | WEIGHT: 121 LBS | BODY MASS INDEX: 20.66 KG/M2 | DIASTOLIC BLOOD PRESSURE: 102 MMHG | HEART RATE: 62 BPM | OXYGEN SATURATION: 99 % | SYSTOLIC BLOOD PRESSURE: 152 MMHG | TEMPERATURE: 97.7 F

## 2019-11-21 DIAGNOSIS — M54.50 CHRONIC BILATERAL LOW BACK PAIN WITHOUT SCIATICA: ICD-10-CM

## 2019-11-21 DIAGNOSIS — D64.9 NORMOCYTIC ANEMIA: ICD-10-CM

## 2019-11-21 DIAGNOSIS — G89.29 CHRONIC BILATERAL LOW BACK PAIN WITHOUT SCIATICA: ICD-10-CM

## 2019-11-21 DIAGNOSIS — I10 ESSENTIAL HYPERTENSION: Primary | ICD-10-CM

## 2019-11-21 DIAGNOSIS — E87.6 HYPOKALEMIA: ICD-10-CM

## 2019-11-21 DIAGNOSIS — Z23 ENCOUNTER FOR IMMUNIZATION: ICD-10-CM

## 2019-11-21 RX ORDER — POTASSIUM CHLORIDE 20 MEQ/1
20 TABLET, EXTENDED RELEASE ORAL 2 TIMES DAILY
Qty: 90 TAB | Refills: 0 | Status: SHIPPED | OUTPATIENT
Start: 2019-11-21 | End: 2021-12-28

## 2019-11-21 RX ORDER — AMLODIPINE BESYLATE 10 MG/1
10 TABLET ORAL DAILY
Qty: 90 TAB | Refills: 0 | Status: SHIPPED | OUTPATIENT
Start: 2019-11-21 | End: 2020-10-27

## 2019-11-21 RX ORDER — CYCLOBENZAPRINE HCL 10 MG
10 TABLET ORAL
Qty: 30 TAB | Refills: 0 | Status: SHIPPED | OUTPATIENT
Start: 2019-11-21 | End: 2021-12-28 | Stop reason: SDUPTHER

## 2019-11-21 RX ORDER — IBUPROFEN 800 MG/1
800 TABLET ORAL
Qty: 30 TAB | Refills: 0 | Status: SHIPPED | OUTPATIENT
Start: 2019-11-21 | End: 2020-07-07 | Stop reason: SDUPTHER

## 2019-11-21 RX ORDER — METOPROLOL TARTRATE 100 MG/1
100 TABLET ORAL 2 TIMES DAILY
Qty: 180 TAB | Refills: 0 | Status: SHIPPED | OUTPATIENT
Start: 2019-11-21 | End: 2020-10-27

## 2019-11-21 RX ORDER — LISINOPRIL 40 MG/1
40 TABLET ORAL DAILY
Qty: 90 TAB | Refills: 0 | Status: SHIPPED | OUTPATIENT
Start: 2019-11-21 | End: 2020-10-27

## 2019-11-21 NOTE — PROGRESS NOTES
Sarah Erickson, 47 y.o.,  female    SUBJECTIVE  Ff-up ([facility] pt)    HTN- she reports forgetting to take her BP meds today. She in on multiple anithypertensives, On file she is on amlodipine 10 mg, chlorthalidone 25 mg, hydralazine 100 mg, metoprolol 100 mg bid, lisinporil 20 mg and aldacton 25 mg. Reviewed labs mildly hypokalemic 3.3    DAMIAN- she reports be anemic 'all my life' . She states she has heavy menses, had not had CRCS screening. Hgb 8.7>9.9, fe profile 2018 suggestive of DAMIAN. she has not had GI/gyne evaluation as recommended by her PCP, she says she is self pay currently. She denies fatigue, pica, melena, dysphagia. Requesting refill on flexeril and ibuprofen, she reports intermittent back spasms for quite some time now. Previously responding to these medications, she denies any radicular symptoms,  paresthesia, incontinence. She was advised to go through PT which she did not pursue, she says due to her current uninsured status. She is working towards getting medicaid. ROS:  See HPI, all others negative        Patient Active Problem List   Diagnosis Code    Back pain M54.9    Numbness and tingling R20.0, R20.2    Acute cerebral infarction ([facility] [address] I63.9    Essential hypertension with goal blood pressure less than 140/90 I10    Anxiety F41.9    Lumbosacral spondylosis without myelopathy M47.817    DDD (degenerative disc disease), lumbar M51.36    Lumbar neuritis M54.16    Scoliosis of lumbar spine M41.9    Iron deficiency anemia due to chronic blood loss D50.0    Laryngitis J04.0    Conjunctivitis H10.9    Essential hypertension I10    URI, acute J06.9       Current Outpatient Medications   Medication Sig Dispense Refill    cyclobenzaprine (FLEXERIL) 10 mg tablet Take 1 Tab by mouth nightly as needed for Muscle Spasm(s). 30 Tab 0    metoprolol tartrate (LOPRESSOR) 100 mg IR tablet Take 1 Tab by mouth two (2) times a day.  180 Tab 0    lisinopril (PRINIVIL, ZESTRIL) 40 mg tablet Take 1 Tab by mouth daily. 90 Tab 0    amLODIPine (NORVASC) 10 mg tablet Take 1 Tab by mouth daily. Indications: high blood pressure 90 Tab 0    ibuprofen (MOTRIN) 800 mg tablet Take 1 Tab by mouth every eight (8) hours as needed for Pain. 30 Tab 0    potassium chloride (K-DUR, KLOR-CON) 20 mEq tablet Take 1 Tab by mouth two (2) times a day. 90 Tab 0    chlorthalidone (HYGROTEN) 25 mg tablet Take 1 Tab by mouth daily. 90 Tab 0    hydrALAZINE (APRESOLINE) 100 mg tablet Take 1 Tab by mouth three (3) times daily. 90 Tab 2    ferrous sulfate (IRON) 325 mg (65 mg iron) EC tablet Take 1 Tab by mouth three (3) times daily (with meals). 30 Tab 0    spironolactone (ALDACTONE) 25 mg tablet Take 1 Tab by mouth daily. 30 Tab 0    topiramate (TOPAMAX) 50 mg tablet Take 1 Tab by mouth two (2) times a day.  180 Tab 0       No Known Allergies    Past Medical History:   Diagnosis Date    Abnormal mammogram 3/2016    BIRADS 3 right breast    Hypertension     Stroke [facility]) 2016    CVA       Social History     Socioeconomic History    Marital status:      Spouse name: Not on file    Number of children: Not on file    Years of education: Not on file    Highest education level: Not on file   Occupational History    Not on file   Social Needs    Financial resource strain: Not on file    Food insecurity:     Worry: Not on file     Inability: Not on file    Transportation needs:     Medical: Not on file     Non-medical: Not on file   Tobacco Use    Smoking status: Former Smoker     Last attempt to quit: 1999     Years since quittin.0    Smokeless tobacco: Never Used   Substance and Sexual Activity    Alcohol use: No    Drug use: No    Sexual activity: Yes     Partners: Male     Birth control/protection: Condom   Lifestyle    Physical activity:     Days per week: Not on file     Minutes per session: Not on file    Stress: Not on file   Relationships    Social connections:     Talks on phone: Not on file     Gets together: Not on file     Attends Ep[employer] service: Not on file     Active member of club or organization: Not on file     Attends meetings of clubs or organizations: Not on file     Relationship status: Not on file    Intimate partner violence:     Fear of current or ex partner: Not on file     Emotionally abused: Not on file     Physically abused: Not on file     Forced sexual activity: Not on file   Other Topics Concern    Not on file   Social History Narrative    Not on file       Family History   Problem Relation Age of Onset    No Known Problems Mother     No Known Problems Father     No Known Problems Sister     No Known Problems Brother     No Known Problems Maternal Aunt     No Known Problems Maternal Uncle     No Known Problems Paternal Aunt     No Known Problems Paternal Uncle     No Known Problems Maternal Grandmother     No Known Problems Paternal Grandmother     No Known Problems Paternal Grandfather     No Known Problems Other          OBJECTIVE    Physical Exam:     Visit Vitals  BP (!) 152/102 (BP 1 Location: Left arm, BP Patient Position: Sitting)   Pulse 62   Temp 97.7 °F (36.5 °C) (Oral)   Resp 16   Ht 5' 4\" (1.626 m)   Wt 121 lb (54.9 kg)   SpO2 99%   BMI 20.77 kg/m²       General: alert, well-appearing, in no apparent distress or pain  CVS: normal rate, regular rhythm, distinct S1 and S2  Lungs:clear to ausculation bilaterally, no crackles, wheezing or rhonchi noted  Abdomen: normoactive bowel sounds, soft, non-tender  Back: no tenderness, DTRs motor intact   Extremities: no edema, no cyanosis, MSK grossly normal  Skin: warm, no lesions, rashes noted  Psych:  mood and affect normal    Results for orders placed or performed during the hospital encounter of 11/08/19   CBC WITH AUTOMATED DIFF   Result Value Ref Range    WBC 4.5 (L) 4.6 - 13.2 K/uL    RBC 4.33 4.20 - 5.30 M/uL    HGB 9.9 (L) 12.0 - 16.0 g/dL    HCT 33.0 (L) 35.0 - 45.0 %    MCV 76.2 74.0 - 97.0 FL    MCH 22.9 (L) 24.0 - 34.0 PG    MCHC 30.0 (L) 31.0 - 37.0 g/dL    RDW 15.7 (H) 11.6 - 14.5 %    PLATELET 417 157 - 374 K/uL    MPV 11.2 9.2 - 11.8 FL    NEUTROPHILS 61 40 - 73 %    LYMPHOCYTES 27 21 - 52 %    MONOCYTES 9 3 - 10 %    EOSINOPHILS 2 0 - 5 %    BASOPHILS 1 0 - 2 %    ABS. NEUTROPHILS 2.7 1.8 - 8.0 K/UL    ABS. LYMPHOCYTES 1.2 0.9 - 3.6 K/UL    ABS. MONOCYTES 0.4 0.05 - 1.2 K/UL    ABS. EOSINOPHILS 0.1 0.0 - 0.4 K/UL    ABS. BASOPHILS 0.0 0.0 - 0.1 K/UL    DF AUTOMATED     METABOLIC PANEL, COMPREHENSIVE   Result Value Ref Range    Sodium 141 136 - 145 mmol/L    Potassium 3.3 (L) 3.5 - 5.5 mmol/L    Chloride 106 100 - 111 mmol/L    CO2 29 21 - 32 mmol/L    Anion gap 6 3.0 - 18 mmol/L    Glucose 84 74 - 99 mg/dL    BUN 6 (L) 7.0 - 18 MG/DL    Creatinine 0.51 (L) 0.6 - 1.3 MG/DL    BUN/Creatinine ratio 12 12 - 20      GFR est AA >60 >60 ml/min/1.73m2    GFR est non-AA >60 >60 ml/min/1.73m2    Calcium 8.5 8.5 - 10.1 MG/DL    Bilirubin, total 0.3 0.2 - 1.0 MG/DL    ALT (SGPT) 14 13 - 56 U/L    AST (SGOT) 14 10 - 38 U/L    Alk. phosphatase 75 45 - 117 U/L    Protein, total 7.4 6.4 - 8.2 g/dL    Albumin 3.6 3.4 - 5.0 g/dL    Globulin 3.8 2.0 - 4.0 g/dL    A-G Ratio 0.9 0.8 - 1.7           ASSESSMENT/PLAN  Diagnoses and all orders for this visit:    1. Essential hypertension  Elevated today, did not take her meds yet  Advised to take all her medications prior to visits  Check BP if persistently >140/90 to see us sooner  DASH diet  -     metoprolol tartrate (LOPRESSOR) 100 mg IR tablet; Take 1 Tab by mouth two (2) times a day. -     lisinopril (PRINIVIL, ZESTRIL) 40 mg tablet; Take 1 Tab by mouth daily. -     amLODIPine (NORVASC) 10 mg tablet; Take 1 Tab by mouth daily. Indications: high blood pressure  -     METABOLIC PANEL, BASIC; Future    2.  Chronic bilateral low back pain without sciatica  HEP provided  Intermittent spasm, advised to pursue PT when available for her  -     cyclobenzaprine (FLEXERIL) 10 mg tablet; Take 1 Tab by mouth nightly as needed for Muscle Spasm(s). 3. Encounter for immunization  -     INFLUENZA VIRUS VAC QUAD,SPLIT,PRESV FREE SYRINGE IM  -     TETANUS, DIPHTHERIA TOXOIDS AND ACELLULAR PERTUSSIS VACCINE (TDAP), IN INDIVIDS. >=7, IM    4. Normocytic anemia  Iron profile c/w DAMIAN 2018  Advised gyne/GI evaluation, she is self pay today  Discussed atleast checking FIT soon  -     OCCULT BLOOD IMMUNOASSAY,DIAGNOSTIC; Future    5. Hypokalemia  Likely due to hygroten, he is on aldactone as well though. Replete with kcl 20 meqs OD  Recheck BMP next week  -     METABOLIC PANEL, BASIC; Future  If persistently low, screen for hyperaldo    Other orders  -     ibuprofen (MOTRIN) 800 mg tablet; Take 1 Tab by mouth every eight (8) hours as needed for Pain. -     potassium chloride (K-DUR, KLOR-CON) 20 mEq tablet; Take 1 Tab by mouth two (2) times a day. Follow-up and Dispositions    · Return in about 2 months (around 1/21/2020), or if symptoms worsen or fail to improve, for routine chronic illness care, with PCP Alecia. Patient understands plan of care. Patient has provided input and agrees with goals.

## 2019-11-21 NOTE — PROGRESS NOTES
1. Have you been to the ER, urgent care clinic since your last visit? Hospitalized since your last visit? Yes Cristian at [facility]    2. Have you seen or consulted any other health care providers outside of the [address] [place] [address] since your last visit? Include any pap smears or colon screening. No   Flulaval 0.5 ml given IM in left deltoid. Lot # [id number], exp date 06/30/2020. Patient tolerated injection well. No adverse reaction noted. Tdap 0.5 ml given IM in right deltoid. Lot # [id number], exp date 01/11/2022. Patient tolerated injection well. No adverse reaction noted.

## 2019-11-21 NOTE — PATIENT INSTRUCTIONS
Vaccine Information Statement    Influenza (Flu) Vaccine (Inactivated or Recombinant): What You Need to Know    Many Vaccine Information Statements are available in Korean and other languages. See www.immunize.org/vis  Hojas de información sobre vacunas están disponibles en español y en muchos otros idiomas. Visite www.immunize.org/vis    1. Why get vaccinated? Influenza vaccine can prevent influenza (flu). Flu is a contagious disease that spreads around the [facility] every year, usually between October and May. Anyone can get the flu, but it is more dangerous for some people. Infants and young children, people 72years of age and older, pregnant women, and people with certain health conditions or a weakened immune system are at greatest risk of flu complications. Pneumonia, bronchitis, sinus infections and ear infections are examples of flu-related complications. If you have a medical condition, such as heart disease, cancer or diabetes, flu can make it worse. Flu can cause fever and chills, sore throat, muscle aches, fatigue, cough, headache, and runny or stuffy nose. Some people may have vomiting and diarrhea, though this is more common in children than adults. Each year thousands of people in the [facility] die from flu, and many more are hospitalized. Flu vaccine prevents millions of illnesses and flu-related visits to the doctor each year. 2. Influenza vaccines     CDC recommends everyone 10months of age and older get vaccinated every flu season. Children 6 months through 6years of age may need 2 doses during a single flu season. Everyone else needs only 1 dose each flu season. It takes about 2 weeks for protection to develop after vaccination. There are many flu viruses, and they are always changing. Each year a new flu vaccine is made to protect against three or four viruses that are likely to cause disease in the upcoming flu season.  Even when the vaccine doesnt exactly match these viruses, it may still provide some protection. Influenza vaccine does not cause flu. Influenza vaccine may be given at the same time as other vaccines. 3. Talk with your health care provider    Tell your vaccine provider if the person getting the vaccine:   Has had an allergic reaction after a previous dose of influenza vaccine, or has any severe, life-threatening allergies.  Has ever had Guillain-Barré Syndrome (also called GBS). In some cases, your health care provider may decide to postpone influenza vaccination to a future visit. People with minor illnesses, such as a cold, may be vaccinated. People who are moderately or severely ill should usually wait until they recover before getting influenza vaccine. Your health care provider can give you more information. 4. Risks of a reaction     Soreness, redness, and swelling where shot is given, fever, muscle aches, and headache can happen after influenza vaccine.  There may be a very small increased risk of Guillain-Barré Syndrome (GBS) after inactivated influenza vaccine (the flu shot). Ap Lee children who get the flu shot along with pneumococcal vaccine (PCV13), and/or DTaP vaccine at the same time might be slightly more likely to have a seizure caused by fever. Tell your health care provider if a child who is getting flu vaccine has ever had a seizure. People sometimes faint after medical procedures, including vaccination. Tell your provider if you feel dizzy or have vision changes or ringing in the ears. As with any medicine, there is a very remote chance of a vaccine causing a severe allergic reaction, other serious injury, or death. 5. What if there is a serious problem? An allergic reaction could occur after the vaccinated person leaves the clinic.  If you see signs of a severe allergic reaction (hives, swelling of the face and throat, difficulty breathing, a fast heartbeat, dizziness, or weakness), call 9-1-1 and get the person to the nearest hospital.    For other signs that concern you, call your health care provider. Adverse reactions should be reported to the Vaccine Adverse Event Reporting System (VAERS). Your health care provider will usually file this report, or you can do it yourself. Visit the VAERS website at www.vaers. [facility].gov or call 0-790.428.4325. VAERS is only for reporting reactions, and VAERS staff do not give medical advice. 6. The National Vaccine Injury Compensation Program    The [facility] Vaccine Injury Compensation Program (VICP) is a federal program that was created to compensate people who may have been injured by certain vaccines. Visit the VICP website at www.[facility].gov/vaccinecompensation or call 2-613.476.2025 to learn about the program and about filing a claim. There is a time limit to file a claim for compensation. 7. How can I learn more?  Ask your health care provider.  Call your local or state health department.  Contact the Centers for Disease Control and Prevention (CDC):  - Call 6-113.348.4233 (9-137-NEI-INFO) or  - Visit CDCs influenza website at www.cdc.gov/flu    Vaccine Information Statement (Interim)  Inactivated Influenza Vaccine   8/15/2019  42 ROSHAN Wily Blake [id number]   Department of Health and Human Services  Centers for Disease Control and Prevention    Office Use Only      Vaccine Information Statement     Tdap (Tetanus, Diphtheria, Pertussis) Vaccine: What You Need to Know    Many Vaccine Information Statements are available in French and other languages. See www.immunize.org/vis. Hojas de Información Sobre Vacunas están disponibles en español y en muchos otros idiomas. Visite [place].si    1. Why get vaccinated? Tetanus, diphtheria, and pertussis are very serious diseases. Tdap vaccine can protect us from these diseases. And, Tdap vaccine given to pregnant women can protect  babies against pertussis.     TETANUS (Lockjaw) is rare in the [place] today. It causes painful muscle tightening and stiffness, usually all over the body.  It can lead to tightening of muscles in the head and neck so you cant open your mouth, swallow, or sometimes even breathe. Tetanus kills about 1 out of 10 people who are infected even after receiving the best medical care. DIPHTHERIA is also rare in the [facility] today. It can cause a thick coating to form in the back of the throat.  It can lead to breathing problems, heart failure, paralysis, and death. PERTUSSIS (Whooping Cough) causes severe coughing spells, which can cause difficulty breathing, vomiting, and disturbed sleep.  It can also lead to weight loss, incontinence, and rib fractures. Up to 2 in 100 adolescents and 5 in 100 adults with pertussis are hospitalized or have complications, which could include pneumonia or death. These diseases are caused by bacteria. Diphtheria and pertussis are spread from person to person through secretions from coughing or sneezing. Tetanus enters the body through cuts, scratches, or wounds. Before vaccines, as many as 200,000 cases of diphtheria, 200,000 cases of pertussis, and hundreds of cases of tetanus, were reported in the [place] each year. Since vaccination began, reports of cases for tetanus and diphtheria have dropped by about 99% and for pertussis by about 80%. 2. Tdap vaccine    Tdap vaccine can protect adolescents and adults from tetanus, diphtheria, and pertussis. One dose of Tdap is routinely given at age 6 or 15. People who did not get Tdap at that age should get it as soon as possible. Tdap is especially important for health care professionals and anyone having close contact with a baby younger than 12 months. Pregnant women should get a dose of Tdap during every pregnancy, to protect the  from pertussis.   Infants are most at risk for severe, life-threatening complications from pertussis. Another vaccine, called Td, protects against tetanus and diphtheria, but not pertussis. A Td booster should be given every 10 years. Tdap may be given as one of these boosters if you have never gotten Tdap before. Tdap may also be given after a severe cut or burn to prevent tetanus infection. Your doctor or the person giving you the vaccine can give you more information. Tdap may safely be given at the same time as other vaccines. 3. Some people should not get this vaccine     A person who has ever had a life-threatening allergic reaction after a previous dose of any diphtheria, tetanus or pertussis containing vaccine, OR has a severe allergy to any part of this vaccine, should not get Tdap vaccine. Tell the person giving the vaccine about any severe allergies.  Anyone who had coma or long repeated seizures within 7 days after a childhood dose of DTP or DTaP, or a previous dose of Tdap, should not get Tdap, unless a cause other than the vaccine was found. They can still get Td.  Talk to your doctor if you:  - have seizures or another nervous system problem,  - had severe pain or swelling after any vaccine containing diphtheria, tetanus or pertussis,   - ever had a condition called Guillain Barré Syndrome (GBS),  - arent feeling well on the day the shot is scheduled. 4. Risks    With any medicine, including vaccines, there is a chance of side effects. These are usually mild and go away on their own. Serious reactions are also possible but are rare. Most people who get Tdap vaccine do not have any problems with it.     Mild Problems following Tdap  (Did not interfere with activities)   Pain where the shot was given (about 3 in 4 adolescents or 2 in 3 adults)   Redness or swelling where the shot was given (about 1 person in 5)   Mild fever of at least 100.4°F (up to about 1 in 25 adolescents or 1 in 100 adults)   Headache (about 3 or 4 people in 10)   Tiredness (about 1 person in 3 or 4)   Nausea, vomiting, diarrhea, stomach ache (up to 1 in 4 adolescents or 1 in 10 adults)   Chills,  sore joints (about 1 person in 10)   Body aches (about 1 person in 3 or 4)    Rash, swollen glands (uncommon)    Moderate Problems following Tdap  (Interfered with activities, but did not require medical attention)   Pain where the shot was given (up to 1 in 5 or 6)    Redness or swelling where the shot was given (up to about 1 in 16 adolescents or 1 in 12 adults)   Fever over 102°F (about 1 in 100 adolescents or 1 in 250 adults)   Headache (about 1 in 7 adolescents or 1 in 10 adults)   Nausea, vomiting, diarrhea, stomach ache (up to 1 or 3 people in 100)   Swelling of the entire arm where the shot was given (up to about 1 in 500). Severe Problems following Tdap  (Unable to perform usual activities; required medical attention)   Swelling, severe pain, bleeding, and redness in the arm where the shot was given (rare). Problems that could happen after any vaccine:     People sometimes faint after a medical procedure, including vaccination. Sitting or lying down for about 15 minutes can help prevent fainting, and injuries caused by a fall. Tell your doctor if you feel dizzy, or have vision changes or ringing in the ears.  Some people get severe pain in the shoulder and have difficulty moving the arm where a shot was given. This happens very rarely.  Any medication can cause a severe allergic reaction. Such reactions from a vaccine are very rare, estimated at fewer than 1 in a million doses, and would happen within a few minutes to a few hours after the vaccination. As with any medicine, there is a very remote chance of a vaccine causing a serious injury or death. The safety of vaccines is always being monitored. For more information, visit: www.cdc.gov/vaccinesafety/    5. What if there is a serious problem? What should I look for?    Look for anything that concerns you, such as signs of a severe allergic reaction, very high fever, or unusual behavior.  Signs of a severe allergic reaction can include hives, swelling of the face and throat, difficulty breathing, a fast heartbeat, dizziness, and weakness. These would usually start a few minutes to a few hours after the vaccination. What should I do?  If you think it is a severe allergic reaction or other emergency that cant wait, call 9-1-1 or get the person to the nearest hospital. Otherwise, call your doctor.  Afterward, the reaction should be reported to the Vaccine Adverse Event Reporting System (VAERS). Your doctor might file this report, or you can do it yourself through the VAERS web site at www.vaers. [facility].gov, or by calling 0-409.852.7976. VAERS does not give medical advice. 6. The National Vaccine Injury Compensation Program    The [facility] Vaccine Injury Compensation Program (VICP) is a federal program that was created to compensate people who may have been injured by certain vaccines. Persons who believe they may have been injured by a vaccine can learn about the program and about filing a claim by calling 2-742.372.7998 or visiting the [facility] [address] [address] website at www.[facility].gov/vaccinecompensation. There is a time limit to file a claim for compensation. 7. How can I learn more?  Ask your doctor. He or she can give you the vaccine package insert or suggest other sources of information.  Call your local or state health department.  Contact the Centers for Disease Control and Prevention (CDC):  - Call 8-508.380.6496 (1-800-CDC-INFO) or  - Visit CDCs website at www.cdc.gov/vaccines      Vaccine Information Statement   Tdap Vaccine  (2/24/2015)  42 ROSHAN Diallo [id number]    Department of Health and Human Services  Centers for Disease Control and Prevention    Office Use Only

## 2019-11-27 ENCOUNTER — HOSPITAL ENCOUNTER (EMERGENCY)
Age: 54
Discharge: STILL ADMITTED OR ADMINISTRATIVE | End: 2019-11-27
Admitting: EMERGENCY MEDICINE
Payer: MEDICAID

## 2019-11-27 PROCEDURE — 75810000275 HC EMERGENCY DEPT VISIT NO LEVEL OF CARE

## 2019-11-28 PROCEDURE — 75810000275 HC EMERGENCY DEPT VISIT NO LEVEL OF CARE

## 2020-01-21 ENCOUNTER — OFFICE VISIT (OUTPATIENT)
Dept: FAMILY MEDICINE CLINIC | Age: 55
End: 2020-01-21

## 2020-01-21 VITALS
SYSTOLIC BLOOD PRESSURE: 140 MMHG | TEMPERATURE: 98.4 F | WEIGHT: 114 LBS | RESPIRATION RATE: 16 BRPM | OXYGEN SATURATION: 99 % | DIASTOLIC BLOOD PRESSURE: 90 MMHG | BODY MASS INDEX: 19.46 KG/M2 | HEART RATE: 66 BPM | HEIGHT: 64 IN

## 2020-01-21 DIAGNOSIS — D50.9 IRON DEFICIENCY ANEMIA, UNSPECIFIED IRON DEFICIENCY ANEMIA TYPE: ICD-10-CM

## 2020-01-21 DIAGNOSIS — G43.709 CHRONIC MIGRAINE WITHOUT AURA WITHOUT STATUS MIGRAINOSUS, NOT INTRACTABLE: ICD-10-CM

## 2020-01-21 DIAGNOSIS — I10 ESSENTIAL HYPERTENSION: Primary | ICD-10-CM

## 2020-01-21 DIAGNOSIS — E87.6 HYPOKALEMIA: ICD-10-CM

## 2020-01-21 PROBLEM — H10.9 CONJUNCTIVITIS: Status: RESOLVED | Noted: 2019-11-08 | Resolved: 2020-01-21

## 2020-01-21 PROBLEM — J06.9 URI, ACUTE: Status: RESOLVED | Noted: 2019-11-08 | Resolved: 2020-01-21

## 2020-01-21 NOTE — PROGRESS NOTES
Patient: Mary Meza MRN: [id number]  SSN: [id number]    YOB: 1965  Age: 47 y.o. Sex: female      Date of Service: 1/21/2020   Provider: AHSAN Morales   Office Location:   [address] [place] [address] [address] Tucker [address], [address] [facility].  Office Phone: [phone]  Office Fax: [phone]      REASON FOR VISIT:   Chief Complaint   Patient presents with    Anemia    Hypertension    Migraine        VITALS:   Visit Vitals  /90 (BP 1 Location: Left arm, BP Patient Position: Sitting)   Pulse 66   Temp 98.4 °F (36.9 °C) (Oral)   Resp 16   Ht 5' 4\" (1.626 m)   Wt 114 lb (51.7 kg)   LMP 12/10/2019   SpO2 99%   BMI 19.57 kg/m²        MEDICATIONS:   Current Outpatient Medications on File Prior to Visit   Medication Sig Dispense Refill    metoprolol tartrate (LOPRESSOR) 100 mg IR tablet Take 1 Tab by mouth two (2) times a day. 180 Tab 0    lisinopril (PRINIVIL, ZESTRIL) 40 mg tablet Take 1 Tab by mouth daily. 90 Tab 0    amLODIPine (NORVASC) 10 mg tablet Take 1 Tab by mouth daily. Indications: high blood pressure 90 Tab 0    potassium chloride (K-DUR, KLOR-CON) 20 mEq tablet Take 1 Tab by mouth two (2) times a day. 90 Tab 0    spironolactone (ALDACTONE) 25 mg tablet Take 1 Tab by mouth daily. 30 Tab 0    chlorthalidone (HYGROTEN) 25 mg tablet Take 1 Tab by mouth daily. 90 Tab 0    hydrALAZINE (APRESOLINE) 100 mg tablet Take 1 Tab by mouth three (3) times daily. 90 Tab 2    ferrous sulfate (IRON) 325 mg (65 mg iron) EC tablet Take 1 Tab by mouth three (3) times daily (with meals). 30 Tab 0    cyclobenzaprine (FLEXERIL) 10 mg tablet Take 1 Tab by mouth nightly as needed for Muscle Spasm(s). 30 Tab 0    ibuprofen (MOTRIN) 800 mg tablet Take 1 Tab by mouth every eight (8) hours as needed for Pain. 30 Tab 0    topiramate (TOPAMAX) 50 mg tablet Take 1 Tab by mouth two (2) times a day.  180 Tab 0     No current facility-administered medications on file prior to visit. ALLERGIES:   No Known Allergies     MEDICAL/SURGICAL HISTORY:  Past Medical History:   Diagnosis Date    Abnormal mammogram 3/2016    BIRADS 3 right breast    Hypertension     Stroke ([address] 2016    CVA      Past Surgical History:   Procedure Laterality Date    HX GYN               FAMILY HISTORY:  Family History   Problem Relation Age of Onset    No Known Problems Mother     No Known Problems Father     No Known Problems Sister     No Known Problems Brother     No Known Problems Maternal Aunt     No Known Problems Maternal Uncle     No Known Problems Paternal Aunt     No Known Problems Paternal Uncle     No Known Problems Maternal Grandmother     No Known Problems Paternal Grandmother     No Known Problems Paternal Grandfather     No Known Problems Other         SOCIAL HISTORY:  Social History     Tobacco Use    Smoking status: Former Smoker     Last attempt to quit: 1999     Years since quittin.1    Smokeless tobacco: Never Used   Substance Use Topics    Alcohol use: No    Drug use: No             HISTORY OF PRESENT ILLNESS: Freddy Lynn is a 47 y.o. female who presents to the office for a routine follow up visit. Hypertension -   BP stable today. Significant improvement from previous readings in the 949J systolic. Patient reports compliance with amlodipine 10 mg daily, chlorthalidone 25 mg daily, hydralazine 100 mg TID, metoprolol 100 mg BID, lisinopril 40 mg daily, spironolactone 25 mg daily. As part of her HTN workup, she had an echocardiogram last year which showed an elevated pulmonary arterial pressure. She was referred to pulmonology/sleep medicine (Dr. Jovanni Villanueva) and had a home sleep study which was unfortunately inconclusive. She had to cancel her hospital PSG due to a conflict and has not yet rescheduled. Anemia -   Iron deficiency of unclear etiology.  Patient is asymptomatic, but she is long overdue for labs and colorectal cancer screening. Patient also mentions that she is still getting a regular monthly menstrual period, but states bleeding has never been heavy. She has not seen a gynecologist in a few years. I have recommended GI workup and gynecology eval on several occasions, but patient has not followed through, partially due to lack of health insurance. FIT test was ordered at last routine f/u with Dr. Nick Longoria. She now has Medicaid.      Migraines -   Stable, well controlled on Topamax    REVIEW OF SYSTEMS:  Review of Systems   Constitutional: Negative for chills, fever and malaise/fatigue. Eyes: Negative for blurred vision and double vision. Respiratory: Negative for cough, shortness of breath and wheezing. Cardiovascular: Negative for chest pain and leg swelling. Neurological: Negative for dizziness and headaches. PHYSICAL EXAMINATION:  Physical Exam  Cardiovascular:      Rate and Rhythm: Normal rate and regular rhythm. Heart sounds: Normal heart sounds. No murmur. No friction rub. No gallop. Pulmonary:      Effort: Pulmonary effort is normal.      Breath sounds: Normal breath sounds. No wheezing or rales. Skin:     General: Skin is warm and dry. Findings: No rash. Neurological:      Mental Status: She is alert and oriented to person, place, and time. Gait: Gait is intact. Psychiatric:         Mood and Affect: Mood and affect normal.         Cognition and Memory: Memory normal.          RESULTS:  No results found for this visit on 01/21/20. ASSESSMENT/PLAN:  Diagnoses and all orders for this visit:    1. Essential hypertension  - BP stable as patient is becoming more compliant with her medications   - continue current regimen  - update fasting labs  - Patient again reminded to f/u with her cardiologist and pulmonologist for pulmonary HTN workup. Orders:    -     METABOLIC PANEL, COMPREHENSIVE; Future  -     LIPID PANEL; Future    2.  Hypokalemia  - Noted on labs from November, likely diuretic induced   - Now on potassium supplement  - advised to repeat labs asap  Orders:    -     METABOLIC PANEL, COMPREHENSIVE; Future    3. Iron deficiency anemia, unspecified iron deficiency anemia type  - Update labs  - Complete FIT test ASAP, if positive will refer to GI   - Will do pap/pelvic at next visit since patient does not currently have a gynecologist. Consider referral, however, as she is still getting a regular menstrual period at age 47 and this may be the source of her DAMIAN   Orders:    -     CBC WITH AUTOMATED DIFF; Future  -     FERRITIN; Future  -     IRON PROFILE; Future    4. Chronic migraine without aura without status migrainosus, not intractable  - Stable, continue topamax           All questions answered. Patient expresses understanding and agrees with the plan as detailed above.     PATIENT CARE TEAM:   Patient Care Team:  AHSAN Vela as PCP - General (Physician Assistant)  AHSAN Vela as PCP - [facility] Empaneled Provider  Ashly Pugh MD as Consulting Provider (Neurology)  Richard Miguel NP (Nurse Practitioner)  Prosper Peralta MD (Inactive) (Colon and Rectal Surgery)       AHSAN Alatorre   January 22, 2020   11:52 AM

## 2020-01-21 NOTE — PATIENT INSTRUCTIONS
Learning About Diuretics for High Blood Pressure  Introduction  Diuretics help to lower blood pressure. This reduces your risk of a heart attack and stroke. It also reduces your risk of kidney disease. Diuretics cause your kidneys to remove sodium and water. They also relax the blood vessel walls. These help lower your blood pressure. Examples  · Chlorthalidone  · Hydrochlorothiazide  Possible side effects  There are some common side effects. They are:  · Too little potassium. · Feeling dizzy. · Rash. · Urinating a lot. · High blood sugar. (But this is not common.)  You may have other side effects. Check the information that comes with your medicine. What to know about taking this medicine  · You may take other medicines for blood pressure. Diuretics can help those work better. They can also prevent extra fluid in your body. · You may need to take potassium pills. Or you may have to watch how much potassium is in your food. Ask your doctor about this. · You may need blood tests to check your kidneys and your potassium level. · Take your medicines exactly as prescribed. Call your doctor if you think you are having a problem with your medicine. · Check with your doctor or pharmacist before you use any other medicines. This includes over-the-counter medicines. Make sure your doctor knows all of the medicines, vitamins, herbal products, and supplements you take. Taking some medicines together can cause problems. Where can you learn more? Go to http://collette-grecia.info/. Enter J699 in the search box to learn more about \"Learning About Diuretics for High Blood Pressure. \"  Current as of: April 9, 2019  Content Version: 12.2  © 1952-8449 [employer]. Care instructions adapted under license by [employer] (which disclaims liability or warranty for this information).  If you have questions about a medical condition or this instruction, always ask your healthcare professional. [address] any warranty or liability for your use of this information. Acute High Blood Pressure: Care Instructions  Your Care Instructions    Acute high blood pressure is very high blood pressure. It's a serious problem. Very high blood pressure can damage your brain, heart, eyes, and kidneys. You may have been given medicines to lower your blood pressure. You may have gotten them as pills or through a needle in one of your veins. This is called an IV. And maybe you were given other medicines too. These can be needed when high blood pressure causes other problems. To keep your blood pressure at a lower level, you may need to make healthy lifestyle changes. And you will probably need to take medicines. Be sure to follow up with your doctor about your blood pressure and what you can do about it. Follow-up care is a key part of your treatment and safety. Be sure to make and go to all appointments, and call your doctor if you are having problems. It's also a good idea to know your test results and keep a list of the medicines you take. How can you care for yourself at home? · See your doctor as often as he or she recommends. This is to make sure your blood pressure is under control. You will probably go at least 2 times a year. But it may be more often at first.  · Take your blood pressure medicine exactly as prescribed. You may take one or more types. They include diuretics, beta-blockers, ACE inhibitors, calcium channel blockers, and angiotensin II receptor blockers. Call your doctor if you think you are having a problem with your medicine. · If you take blood pressure medicine, talk to your doctor before you take decongestants or anti-inflammatory medicine, such as ibuprofen. These can raise blood pressure. · Learn how to check your blood pressure at home. Check it often. · Ask your doctor if it's okay to drink alcohol.   · Talk to your doctor about lifestyle changes that can help blood pressure. These include being active and managing your weight. · Don't smoke. Smoking increases your risk for heart attack and stroke. When should you call for help? Call  911 anytime you think you may need emergency care. This may mean having symptoms that suggest that your blood pressure is causing a serious heart or blood vessel problem. Your blood pressure may be over 180/120.   For example, call  911 if:    · You have symptoms of a heart attack. These may include:  ? Chest pain or pressure, or a strange feeling in the chest.  ? Sweating. ? Shortness of breath. ? Nausea or vomiting. ? Pain, pressure, or a strange feeling in the back, neck, jaw, or upper belly or in one or both shoulders or arms. ? Lightheadedness or sudden weakness. ? A fast or irregular heartbeat.     · You have symptoms of a stroke. These may include:  ? Sudden numbness, tingling, weakness, or loss of movement in your face, arm, or leg, especially on only one side of your body. ? Sudden vision changes. ? Sudden trouble speaking. ? Sudden confusion or trouble understanding simple statements. ? Sudden problems with walking or balance. ? A sudden, severe headache that is different from past headaches.     · You have severe back or belly pain.    Do not wait until your blood pressure comes down on its own. Get help right away.   Call your doctor now or seek immediate care if:    · Your blood pressure is much higher than normal (such as 180/120 or higher), but you don't have symptoms.     · You think high blood pressure is causing symptoms, such as:  ? Severe headache.  ? Blurry vision.    Watch closely for changes in your health, and be sure to contact your doctor if:    · Your blood pressure measures higher than your doctor recommends at least 2 times.  That means the top number is higher or the bottom number is higher, or both.     · You think you may be having side effects from your blood pressure medicine. Where can you learn more? Go to http://collette-grecia.info/. Enter S419 in the search box to learn more about \"Acute High Blood Pressure: Care Instructions. \"  Current as of: April 9, 2019  Content Version: 12.2  © 1119-2180 [employer]. Care instructions adapted under license by [employer] (which disclaims liability or warranty for this information). If you have questions about a medical condition or this instruction, always ask your healthcare professional. [place] [address] any warranty or liability for your use of this information.       Central Scheduling for [facility]

## 2020-02-19 ENCOUNTER — OFFICE VISIT (OUTPATIENT)
Dept: FAMILY MEDICINE CLINIC | Age: 55
End: 2020-02-19

## 2020-02-19 VITALS
WEIGHT: 120 LBS | SYSTOLIC BLOOD PRESSURE: 190 MMHG | BODY MASS INDEX: 20.49 KG/M2 | HEART RATE: 81 BPM | TEMPERATURE: 98 F | OXYGEN SATURATION: 98 % | RESPIRATION RATE: 18 BRPM | DIASTOLIC BLOOD PRESSURE: 110 MMHG | HEIGHT: 64 IN

## 2020-02-19 DIAGNOSIS — R68.89 FLU-LIKE SYMPTOMS: ICD-10-CM

## 2020-02-19 DIAGNOSIS — R11.2 NON-INTRACTABLE VOMITING WITH NAUSEA, UNSPECIFIED VOMITING TYPE: ICD-10-CM

## 2020-02-19 DIAGNOSIS — I10 ESSENTIAL HYPERTENSION: ICD-10-CM

## 2020-02-19 DIAGNOSIS — K52.9 GASTROENTERITIS: Primary | ICD-10-CM

## 2020-02-19 PROBLEM — R11.10 NON-INTRACTABLE VOMITING: Status: ACTIVE | Noted: 2020-02-19

## 2020-02-19 LAB
FLUAV+FLUBV AG NOSE QL IA.RAPID: NEGATIVE POS/NEG
FLUAV+FLUBV AG NOSE QL IA.RAPID: NEGATIVE POS/NEG
VALID INTERNAL CONTROL?: YES

## 2020-02-19 RX ORDER — ONDANSETRON 8 MG/1
8 TABLET, ORALLY DISINTEGRATING ORAL
Qty: 9 TAB | Refills: 0 | Status: SHIPPED | OUTPATIENT
Start: 2020-02-19 | End: 2020-07-07

## 2020-02-19 NOTE — PROGRESS NOTES
Rosita Mckinney presents today for   Chief Complaint   Patient presents with    Vomiting     c/o not being able to hold anything down since yesterday    Generalized Body Aches     Body aches since yesterday. Is someone accompanying this pt? No    Is the patient using any DME equipment during OV? No    Depression Screening:  3 most recent PHQ Screens 2/19/2020   Little interest or pleasure in doing things Not at all   Feeling down, depressed, irritable, or hopeless Not at all   Total Score PHQ 2 0       Learning Assessment:  Learning Assessment 4/30/2018   PRIMARY LEARNER Patient   HIGHEST LEVEL OF EDUCATION - PRIMARY LEARNER  2 YEARS Ashleyelygutierrez PRIMARY LEARNER NONE   CO-LEARNER CAREGIVER No   PRIMARY LANGUAGE ENGLISH   LEARNER PREFERENCE PRIMARY LISTENING     -   ANSWERED BY patient   RELATIONSHIP SELF       Abuse Screening:  Abuse Screening Questionnaire 2/19/2020   Do you ever feel afraid of your partner? N   Are you in a relationship with someone who physically or mentally threatens you? N   Is it safe for you to go home? Y         Health Maintenance Due   Topic Date Due    Shingrix Vaccine Age 49> (1 of 2) 07/27/2015    FOBT Q1Y Age 54-65  03/02/2017   . Health Maintenance reviewed and discussed and ordered per Provider. Coordination of Care  1. Have you been to the ER, urgent care clinic since your last visit? Hospitalized since your last visit? No    2. Have you seen or consulted any other health care providers outside of the [address] [place] [address] since your last visit? Include any pap smears or colon screening. No          Advance Directive:  1. Do you have an advance directive in place? Patient Reply:No    2. If not, would you like material regarding how to put one in place?  Patient Reply: No

## 2020-02-19 NOTE — LETTER
NOTIFICATION RETURN TO WORK / SCHOOL 
 
2/19/2020 12:24 PM 
 
Ms. [employer] [address] 
[address] To Whom It May Concern: 
 
[employer] is currently under the care of Tamiko Antonio. She will return to work on: February 21, 2020. If there are questions or concerns please have the patient contact our office. Sincerely, Josselin Michelle NP

## 2020-02-19 NOTE — PROGRESS NOTES
HPI  Pt of A[facility]AN Finley. Presents with one day history of nausea, vomiting, abdominal pain, tactile fever, chills, body aches. No diarrhea. Approximately 6 episodes of vomiting. No blood in vomit. Last episode of vomiting was at 9 AM.  Tried to eat some ice chips and can't even keep that down. Gets lightheaded upon standing. No ill contacts. Hasn't been eating out. BP elevated but hasn't taken medications yet today because of vomiting. Past Medical History  Past Medical History:   Diagnosis Date    Abnormal mammogram 3/2016    BIRADS 3 right breast    Hypertension     Stroke [facility]) 2016    CVA       Surgical History  Past Surgical History:   Procedure Laterality Date    HX GYN               Medications  Current Outpatient Medications   Medication Sig Dispense Refill    ondansetron (ZOFRAN ODT) 8 mg disintegrating tablet Take 1 Tab by mouth every eight (8) hours as needed for Nausea or Vomiting. 9 Tab 0    cyclobenzaprine (FLEXERIL) 10 mg tablet Take 1 Tab by mouth nightly as needed for Muscle Spasm(s). 30 Tab 0    metoprolol tartrate (LOPRESSOR) 100 mg IR tablet Take 1 Tab by mouth two (2) times a day. 180 Tab 0    lisinopril (PRINIVIL, ZESTRIL) 40 mg tablet Take 1 Tab by mouth daily. 90 Tab 0    amLODIPine (NORVASC) 10 mg tablet Take 1 Tab by mouth daily. Indications: high blood pressure 90 Tab 0    potassium chloride (K-DUR, KLOR-CON) 20 mEq tablet Take 1 Tab by mouth two (2) times a day. 90 Tab 0    spironolactone (ALDACTONE) 25 mg tablet Take 1 Tab by mouth daily. 30 Tab 0    chlorthalidone (HYGROTEN) 25 mg tablet Take 1 Tab by mouth daily. 90 Tab 0    hydrALAZINE (APRESOLINE) 100 mg tablet Take 1 Tab by mouth three (3) times daily. 90 Tab 2    topiramate (TOPAMAX) 50 mg tablet Take 1 Tab by mouth two (2) times a day. 180 Tab 0    ferrous sulfate (IRON) 325 mg (65 mg iron) EC tablet Take 1 Tab by mouth three (3) times daily (with meals).  30 Tab 0    ibuprofen (MOTRIN) 800 mg tablet Take 1 Tab by mouth every eight (8) hours as needed for Pain.  30 Tab 0       Allergies  No Known Allergies    Family History  Family History   Problem Relation Age of Onset    No Known Problems Mother     No Known Problems Father     No Known Problems Sister     No Known Problems Brother     No Known Problems Maternal Aunt     No Known Problems Maternal Uncle     No Known Problems Paternal Aunt     No Known Problems Paternal Uncle     No Known Problems Maternal Grandmother     No Known Problems Paternal Grandmother     No Known Problems Paternal Grandfather     No Known Problems Other        Social History  Social History     Socioeconomic History    Marital status:      Spouse name: Not on file    Number of children: Not on file    Years of education: Not on file    Highest education level: Not on file   Occupational History    Not on file   Social Needs    Financial resource strain: Not on file    Food insecurity:     Worry: Not on file     Inability: Not on file    Transportation needs:     Medical: Not on file     Non-medical: Not on file   Tobacco Use    Smoking status: Former Smoker     Last attempt to quit: 1999     Years since quittin.2    Smokeless tobacco: Never Used   Substance and Sexual Activity    Alcohol use: No    Drug use: No    Sexual activity: Yes     Partners: Male     Birth control/protection: Condom   Lifestyle    Physical activity:     Days per week: Not on file     Minutes per session: Not on file    Stress: Not on file   Relationships    Social connections:     Talks on phone: Not on file     Gets together: Not on file     Attends [employer] service: Not on file     Active member of club or organization: Not on file     Attends meetings of clubs or organizations: Not on file     Relationship status: Not on file    Intimate partner violence:     Fear of current or ex partner: Not on file     Emotionally abused: Not on file Physically abused: Not on file     Forced sexual activity: Not on file   Other Topics Concern    Not on file   Social History Narrative    Not on file       Problem List  Patient Active Problem List   Diagnosis Code    Back pain M54.9    Numbness and tingling R20.0, R20.2    Acute cerebral infarction ([facility] [address] I63.9    Essential hypertension with goal blood pressure less than 140/90 I10    Anxiety F41.9    Lumbosacral spondylosis without myelopathy M47.817    DDD (degenerative disc disease), lumbar M51.36    Lumbar neuritis M54.16    Scoliosis of lumbar spine M41.9    Iron deficiency anemia due to chronic blood loss D50.0    Laryngitis J04.0    Essential hypertension I10    Non-intractable vomiting R11.10    Flu-like symptoms R68.89    Gastroenteritis K52.9       Review of Systems  Review of Systems   Constitutional: Positive for chills, fever and malaise/fatigue. Respiratory: Negative. Cardiovascular: Negative. Gastrointestinal: Positive for abdominal pain, nausea and vomiting. Negative for diarrhea. Musculoskeletal: Positive for myalgias. Neurological: Negative. Psychiatric/Behavioral: Negative. Vital Signs  Vitals:    02/19/20 1209 02/19/20 1222   BP: (!) 190/110 (!) 190/110   Pulse: 81    Resp: 18    Temp: 98 °F (36.7 °C)    TempSrc: Oral    SpO2: 98%    Weight: 120 lb (54.4 kg)    Height: 5' 4\" (1.626 m)    PainSc:   3    PainLoc: Generalized        Physical Exam  Physical Exam  Vitals signs and nursing note reviewed. Constitutional:       Appearance: Normal appearance. She is ill-appearing. HENT:      Mouth/Throat:      Mouth: Mucous membranes are moist.   Neck:      Musculoskeletal: Normal range of motion and neck supple. Cardiovascular:      Rate and Rhythm: Normal rate and regular rhythm. Heart sounds: Normal heart sounds. Pulmonary:      Effort: Pulmonary effort is normal.      Breath sounds: Normal breath sounds.    Abdominal:      General: Bowel sounds are normal. There is no distension. Palpations: Abdomen is soft. Tenderness: There is abdominal tenderness. Comments: Epigastric tenderness upon palpation. No rebound tenderness or guarding   Skin:     General: Skin is warm and dry. Neurological:      Mental Status: She is alert and oriented to person, place, and time. Psychiatric:         Mood and Affect: Mood normal.         Behavior: Behavior normal.         Diagnostics  Orders Placed This Encounter    AMB POC RAPID INFLUENZA TEST    ondansetron (ZOFRAN ODT) 8 mg disintegrating tablet     Sig: Take 1 Tab by mouth every eight (8) hours as needed for Nausea or Vomiting. Dispense:  9 Tab     Refill:  0       Results  Results for orders placed or performed in visit on 02/19/20   AMB POC RAPID INFLUENZA TEST   Result Value Ref Range    VALID INTERNAL CONTROL POC Yes     Influenza A Ag POC Negative Negative Pos/Neg    Influenza B Ag POC Negative Negative Pos/Neg       Assessment and Plan  Diagnoses and all orders for this visit:    1. Gastroenteritis  -     ondansetron (ZOFRAN ODT) 8 mg disintegrating tablet; Take 1 Tab by mouth every eight (8) hours as needed for Nausea or Vomiting. Start with sips of liquids and advance as tolerated. [place] diet today. Discussed importance of close follow up if unable to keep down fluids since it is very concerning that blood pressure is very elevated without medication    2. Non-intractable vomiting with nausea, unspecified vomiting type  -     AMB POC RAPID INFLUENZA TEST  -     ondansetron (ZOFRAN ODT) 8 mg disintegrating tablet; Take 1 Tab by mouth every eight (8) hours as needed for Nausea or Vomiting. 3. Flu-like symptoms  -     AMB POC RAPID INFLUENZA TEST  Negative    4. Essential hypertension  Discussed importance of resuming medications as soon as possible and following up in ER if not able to do so      After care summary printed and reviewed with patient. Plan reviewed with patient.  Questions answered. Patient verbalized understanding of plan and is in agreement with plan. Patient to follow up in one week or earlier if symptoms worsen. Return to work letter given. Encouraged the use of my chart.     CHAYO WattsC

## 2020-03-12 ENCOUNTER — HOSPITAL ENCOUNTER (OUTPATIENT)
Dept: LAB | Age: 55
Discharge: HOME | End: 2020-03-12
Payer: MEDICAID

## 2020-03-12 DIAGNOSIS — D50.9 IRON DEFICIENCY ANEMIA, UNSPECIFIED IRON DEFICIENCY ANEMIA TYPE: ICD-10-CM

## 2020-03-12 DIAGNOSIS — I10 ESSENTIAL HYPERTENSION: ICD-10-CM

## 2020-03-12 DIAGNOSIS — E87.6 HYPOKALEMIA: ICD-10-CM

## 2020-03-12 LAB
ALBUMIN SERPL-MCNC: 3.7 G/DL (ref 3.4–5)
ALBUMIN/GLOB SERPL: 1.1 {RATIO} (ref 0.8–1.7)
ALP SERPL-CCNC: 78 U/L (ref 45–117)
ALT SERPL-CCNC: 33 U/L (ref 13–56)
ANION GAP SERPL CALC-SCNC: 5 MMOL/L (ref 3–18)
AST SERPL-CCNC: 32 U/L (ref 10–38)
BASOPHILS # BLD: 0 K/UL (ref 0–0.1)
BASOPHILS NFR BLD: 1 % (ref 0–2)
BILIRUB SERPL-MCNC: 0.2 MG/DL (ref 0.2–1)
BUN SERPL-MCNC: 12 MG/DL (ref 7–18)
BUN/CREAT SERPL: 19 (ref 12–20)
CALCIUM SERPL-MCNC: 8.7 MG/DL (ref 8.5–10.1)
CHLORIDE SERPL-SCNC: 108 MMOL/L (ref 100–111)
CHOLEST SERPL-MCNC: 171 MG/DL
CO2 SERPL-SCNC: 29 MMOL/L (ref 21–32)
CREAT SERPL-MCNC: 0.64 MG/DL (ref 0.6–1.3)
DIFFERENTIAL METHOD BLD: ABNORMAL
EOSINOPHIL # BLD: 0.1 K/UL (ref 0–0.4)
EOSINOPHIL NFR BLD: 2 % (ref 0–5)
ERYTHROCYTE [DISTWIDTH] IN BLOOD BY AUTOMATED COUNT: 16.2 % (ref 11.6–14.5)
FERRITIN SERPL-MCNC: 3 NG/ML (ref 8–388)
GLOBULIN SER CALC-MCNC: 3.5 G/DL (ref 2–4)
GLUCOSE SERPL-MCNC: 90 MG/DL (ref 74–99)
HCT VFR BLD AUTO: 30.7 % (ref 35–45)
HDLC SERPL-MCNC: 83 MG/DL (ref 40–60)
HDLC SERPL: 2.1 {RATIO} (ref 0–5)
HGB BLD-MCNC: 9.3 G/DL (ref 12–16)
IRON SATN MFR SERPL: 4 % (ref 20–50)
IRON SERPL-MCNC: 17 UG/DL (ref 50–175)
LDLC SERPL CALC-MCNC: 74.8 MG/DL (ref 0–100)
LIPID PROFILE,FLP: ABNORMAL
LYMPHOCYTES # BLD: 1.1 K/UL (ref 0.9–3.6)
LYMPHOCYTES NFR BLD: 33 % (ref 21–52)
MCH RBC QN AUTO: 23.3 PG (ref 24–34)
MCHC RBC AUTO-ENTMCNC: 30.3 G/DL (ref 31–37)
MCV RBC AUTO: 76.8 FL (ref 74–97)
MONOCYTES # BLD: 0.3 K/UL (ref 0.05–1.2)
MONOCYTES NFR BLD: 10 % (ref 3–10)
NEUTS SEG # BLD: 1.8 K/UL (ref 1.8–8)
NEUTS SEG NFR BLD: 54 % (ref 40–73)
PLATELET # BLD AUTO: 291 K/UL (ref 135–420)
PMV BLD AUTO: 10.6 FL (ref 9.2–11.8)
POTASSIUM SERPL-SCNC: 3.5 MMOL/L (ref 3.5–5.5)
PROT SERPL-MCNC: 7.2 G/DL (ref 6.4–8.2)
RBC # BLD AUTO: 4 M/UL (ref 4.2–5.3)
SODIUM SERPL-SCNC: 142 MMOL/L (ref 136–145)
TIBC SERPL-MCNC: 442 UG/DL (ref 250–450)
TRIGL SERPL-MCNC: 66 MG/DL (ref ?–150)
VLDLC SERPL CALC-MCNC: 13.2 MG/DL
WBC # BLD AUTO: 3.4 K/UL (ref 4.6–13.2)

## 2020-03-12 PROCEDURE — 80053 COMPREHEN METABOLIC PANEL: CPT

## 2020-03-12 PROCEDURE — 82728 ASSAY OF FERRITIN: CPT

## 2020-03-12 PROCEDURE — 80061 LIPID PANEL: CPT

## 2020-03-12 PROCEDURE — 85025 COMPLETE CBC W/AUTO DIFF WBC: CPT

## 2020-03-12 PROCEDURE — 36415 COLL VENOUS BLD VENIPUNCTURE: CPT

## 2020-03-12 PROCEDURE — 83540 ASSAY OF IRON: CPT

## 2020-03-12 NOTE — PROGRESS NOTES
[phone] all patient identifiers verified with Kim, she was advised that her labs show a persistent iron deficiency anemia and to complete her FIT test right away to exclude GI bleeding as a potential cause of her anemia which was  discussed at her visit in January of 2020. Crystal voice understanding.

## 2020-03-12 NOTE — PROGRESS NOTES
Labs show a persistent iron deficiency anemia. Please advise her to complete her FIT test right away to exclude GI bleeding as a potential cause of her anemia. We discussed this at her visit in January.

## 2020-07-07 ENCOUNTER — HOSPITAL ENCOUNTER (OUTPATIENT)
Dept: GENERAL RADIOLOGY | Age: 55
Discharge: HOME | End: 2020-07-07
Payer: MEDICAID

## 2020-07-07 ENCOUNTER — VIRTUAL VISIT (OUTPATIENT)
Dept: FAMILY MEDICINE CLINIC | Age: 55
End: 2020-07-07

## 2020-07-07 DIAGNOSIS — M79.89 PAIN AND SWELLING OF TOE OF RIGHT FOOT: ICD-10-CM

## 2020-07-07 DIAGNOSIS — M79.89 PAIN AND SWELLING OF TOE OF RIGHT FOOT: Primary | ICD-10-CM

## 2020-07-07 DIAGNOSIS — M79.674 PAIN AND SWELLING OF TOE OF RIGHT FOOT: Primary | ICD-10-CM

## 2020-07-07 DIAGNOSIS — M79.674 PAIN AND SWELLING OF TOE OF RIGHT FOOT: ICD-10-CM

## 2020-07-07 PROCEDURE — 73660 X-RAY EXAM OF TOE(S): CPT

## 2020-07-07 RX ORDER — IBUPROFEN 800 MG/1
800 TABLET ORAL
Qty: 30 TAB | Refills: 0 | Status: SHIPPED | OUTPATIENT
Start: 2020-07-07 | End: 2020-08-25

## 2020-07-07 NOTE — PROGRESS NOTES
Eren Ríos is a 47 y.o. female who was seen by synchronous (real-time) audio-video technology on 7/7/2020 for Toe Pain (Right pinky toe)        Assessment & Plan:   Diagnoses and all orders for this visit:    Pain and swelling of toe of right foot: Had injured right fifth toe on a Friday by getting her with the bed frame. On a virtual visit noted she had taped it along with the fourth toe by putting an ice cream stick in between. Noted mild swelling over the surrounding area. For now advised to take Tylenol or ibuprofen with the food for pain. Ice application. Also obtain an x-ray  Comments:  rt pinky toe   Orders:  -     XR 5TH TOE RT MIN 2 V; Future    Other orders  -     ibuprofen (MOTRIN) 800 mg tablet; Take 1 Tab by mouth every eight (8) hours as needed for Pain., Normal, Disp-30 Tab, R-0    Patient understood and agree with above plan. 712  Subjective:   Done visit with [facility]. Margo patient. Complaint of right pinky toe pain and swelling since Friday after she got her with the bed frame. No open skin. Said she had a tried to put her foot in the absence salts which helped the pain and swelling but it still there so decided to call the office. Pain is mild to moderate in intensity. Able to put minimal weight on it. She has taped her pinky toe with the fourth toe by putting an ice cream stick in between. No fever or no other complaint. No leg swelling. Try to take ibuprofen which is helping little bit. Prior to Admission medications    Medication Sig Start Date End Date Taking? Authorizing Provider   ibuprofen (MOTRIN) 800 mg tablet Take 1 Tab by mouth every eight (8) hours as needed for Pain. 7/7/20  Yes Bernardo Arguelles MD   metoprolol tartrate (LOPRESSOR) 100 mg IR tablet Take 1 Tab by mouth two (2) times a day. 11/21/19  Yes Derian Cavazos MD   lisinopril (PRINIVIL, ZESTRIL) 40 mg tablet Take 1 Tab by mouth daily.  11/21/19  Yes Derian Cavazos MD   amLODIPine (NORVASC) 10 mg tablet Take 1 Tab by mouth daily. Indications: high blood pressure 11/21/19  Yes Dawson Wu MD   potassium chloride (K-DUR, KLOR-CON) 20 mEq tablet Take 1 Tab by mouth two (2) times a day. 11/21/19  Yes Dawson Wu MD   chlorthalidone (HYGROTEN) 25 mg tablet Take 1 Tab by mouth daily. 6/10/19  Yes Margo Finley PA   hydrALAZINE (APRESOLINE) 100 mg tablet Take 1 Tab by mouth three (3) times daily. 6/10/19  Yes Margo Finley PA   ferrous sulfate (IRON) 325 mg (65 mg iron) EC tablet Take 1 Tab by mouth three (3) times daily (with meals). 9/21/18  Yes Bee Atkinson MD   ondansetron (ZOFRAN ODT) 8 mg disintegrating tablet Take 1 Tab by mouth every eight (8) hours as needed for Nausea or Vomiting. 2/19/20 7/7/20  Tate GUARDADO NP   cyclobenzaprine (FLEXERIL) 10 mg tablet Take 1 Tab by mouth nightly as needed for Muscle Spasm(s). 11/21/19   Dawson Wu MD   ibuprofen (MOTRIN) 800 mg tablet Take 1 Tab by mouth every eight (8) hours as needed for Pain. 11/21/19 7/7/20  Dawson Wu MD   spironolactone (ALDACTONE) 25 mg tablet Take 1 Tab by mouth daily. 11/8/19 7/7/20  Aubrie Mahajan NP   topiramate (TOPAMAX) 50 mg tablet Take 1 Tab by mouth two (2) times a day. 6/10/19 7/7/20  Brandon Finley PA         ROS: See HPI    Objective:   No flowsheet data found. General: alert, cooperative, no distress   Mental  status: normal mood, behavior, speech, dress, motor activity, and thought processes, able to follow commands   HENT: NCAT   Neck: no visualized mass   Resp: no respiratory distress   Neuro: no gross deficits   Skin: no discoloration or lesions of concern on visible areas   Psychiatric: normal affect, consistent with stated mood, no evidence of hallucinations     Additional exam findings: Right fifth toe. Noted taped around the toe. Mild swelling over the surrounding area.       We discussed the expected course, resolution and complications of the diagnosis(es) in detail. Medication risks, benefits, costs, interactions, and alternatives were discussed as indicated. I advised her to contact the office if her condition worsens, changes or fails to improve as anticipated. She expressed understanding with the diagnosis(es) and plan. [employer], who was evaluated through a patient-initiated, synchronous (real-time) audio-video encounter, and/or her healthcare decision maker, is aware that it is a billable service, with coverage as determined by her insurance carrier. She provided verbal consent to proceed: Yes, and patient identification was verified. It was conducted pursuant to the emergency declaration under the [facility], [address] [place] [address] authority and the [employer] General Act. A caregiver was present when appropriate. Ability to conduct physical exam was limited. I was in the office. The patient was at home.       Abilio Gee MD

## 2020-07-15 ENCOUNTER — TELEPHONE (OUTPATIENT)
Dept: FAMILY MEDICINE CLINIC | Age: 55
End: 2020-07-15

## 2021-01-11 ENCOUNTER — VIRTUAL VISIT (OUTPATIENT)
Dept: FAMILY MEDICINE CLINIC | Age: 56
End: 2021-01-11
Payer: MEDICAID

## 2021-01-11 DIAGNOSIS — M79.675 PAIN OF TOE OF LEFT FOOT: ICD-10-CM

## 2021-01-11 DIAGNOSIS — M79.642 PAIN OF LEFT HAND: ICD-10-CM

## 2021-01-11 DIAGNOSIS — D50.0 IRON DEFICIENCY ANEMIA DUE TO CHRONIC BLOOD LOSS: ICD-10-CM

## 2021-01-11 DIAGNOSIS — M79.641 PAIN OF RIGHT HAND: ICD-10-CM

## 2021-01-11 DIAGNOSIS — Z12.31 ENCOUNTER FOR SCREENING MAMMOGRAM FOR MALIGNANT NEOPLASM OF BREAST: ICD-10-CM

## 2021-01-11 DIAGNOSIS — I10 ESSENTIAL HYPERTENSION: ICD-10-CM

## 2021-01-11 DIAGNOSIS — M51.36 DDD (DEGENERATIVE DISC DISEASE), LUMBAR: ICD-10-CM

## 2021-01-11 DIAGNOSIS — Z12.11 SCREENING FOR COLON CANCER: ICD-10-CM

## 2021-01-11 DIAGNOSIS — F41.9 ANXIETY: ICD-10-CM

## 2021-01-11 DIAGNOSIS — M79.675 PAIN OF TOE OF LEFT FOOT: Primary | ICD-10-CM

## 2021-01-11 PROCEDURE — 99213 OFFICE O/P EST LOW 20 MIN: CPT | Performed by: FAMILY MEDICINE

## 2021-01-11 RX ORDER — NAPROXEN 500 MG/1
500 TABLET ORAL 2 TIMES DAILY WITH MEALS
Qty: 60 TAB | Refills: 1 | Status: SHIPPED | OUTPATIENT
Start: 2021-01-11 | End: 2021-12-28 | Stop reason: SDUPTHER

## 2021-01-11 NOTE — PROGRESS NOTES
Liam Ortega is a 54 y.o. female who was seen by synchronous (real-time) audio-video technology on 1/11/2021 for Hand Pain        Assessment & Plan:   Diagnoses and all orders for this visit:    Pain of toe of left foot: She had a left fifth toe nondisplaced fracture back in July. Was given conservative treatment. Failed to keep the follow-up. Still having a pain over the affected area on and off with the weightbearing. We will repeat the x-ray meantime symptomatic treatment with naproxen as needed and Tylenol alternatively. Ice application  -     XR 5TH TOE RT MIN 2 V; Future    Pain of left hand: Complaining of hand joint pain and swelling on and off. Obtaining x-ray. If needed we will consider labs. Meantime ice application. Again naproxen and Tylenol as needed. If no improvement will consider further work-up. For now adding ESR. -     XR HAND LT AP/LAT; Future  -     naproxen (NAPROSYN) 500 mg tablet; Take 1 Tab by mouth two (2) times daily (with meals). , Normal, Disp-60 Tab, R-1    Pain of right hand  -     XR HAND LT AP/LAT; Future  -     XR HAND RT AP/LAT; Future  -     naproxen (NAPROSYN) 500 mg tablet; Take 1 Tab by mouth two (2) times daily (with meals). , Normal, Disp-60 Tab, R-1    Iron deficiency anemia due to chronic blood loss: Noted low WBC count and H&H on her last year lab. She is on iron supplement. We will recheck the labs. -     IRON PROFILE; Future  -     CBC WITH AUTOMATED DIFF; Future    Essential hypertension: Checking blood pressure at home. When patient checked at home it was 101/92. She was asymptomatic. Compliant with taking medication. Will observe and continue current plan. -     METABOLIC PANEL, COMPREHENSIVE; Future    Anxiety: Since the pandemic situation it has been on and off. Currently fairly stable. Will observe    DDD (degenerative disc disease), lumbar: Fairly stable at this time. Patient has no concern-     naproxen (NAPROSYN) 500 mg tablet;  Take 1 Tab by mouth two (2) times daily (with meals). , Normal, Disp-60 Tab, R-1    Encounter for screening mammogram for malignant neoplasm of breast  -     DEON MAMMO BI SCREENING INCL CAD; Future    Screening for colon cancer  -     REFERRAL TO GASTROENTEROLOGY    Patient understood and agreed with the plan  She was scheduled for colonoscopy but was canceled due to pandemic situation. Has not rescheduled yet. Send a message to the nurse to confirm that she has to make a follow-up appointment with Dr. Adryan Coyle or done a new referral as well. She was strongly encouraged to make a follow-up for physical as she is overdue 712  Subjective:   Margo patient. Transfer to me. Currently here with a concern of bilateral hand joint pains. Tiffany Lux it is on and off swelling. Feels stiffness in the morning. No injury or direct trauma. No tingling numbness or weakness. No fever. No known autoimmune medical problem. Also she is having still the pain over the left foot on and off with the weightbearing. She had 5th toe nondisplaced fracture. She was treated with the conservative treatment with a hard sole boot and ledy taping. During virtual visit sitting comfortable did not appear in any acute distress. Hypertension. Checking blood pressure at home and is fairly stable. She is asymptomatic and compliant with medication. Blood pressure at home was 1 1/92. Denies any headache, dizziness, no chest pain or trouble breathing, no arm or leg weakness. No nausea or vomiting, no weight or appetite changes, no mood changes . No urine or bowel complains, no palpitation, no diaphoresis. No abdominal pain. No cold or cough. No leg swelling. No fever. No sleep trouble. Review prior labs.   Lab Results   Component Value Date/Time    WBC 3.4 (L) 03/12/2020 10:24 AM    HGB 9.3 (L) 03/12/2020 10:24 AM    HCT 30.7 (L) 03/12/2020 10:24 AM    PLATELET 391 13/97/9007 10:24 AM    MCV 76.8 03/12/2020 10:24 AM     Lab Results   Component Value Date/Time    Sodium 142 03/12/2020 10:24 AM    Potassium 3.5 03/12/2020 10:24 AM    Chloride 108 03/12/2020 10:24 AM    CO2 29 03/12/2020 10:24 AM    Anion gap 5 03/12/2020 10:24 AM    Glucose 90 03/12/2020 10:24 AM    BUN 12 03/12/2020 10:24 AM    Creatinine 0.64 03/12/2020 10:24 AM    BUN/Creatinine ratio 19 03/12/2020 10:24 AM    GFR est AA >60 03/12/2020 10:24 AM    GFR est non-AA >60 03/12/2020 10:24 AM    Calcium 8.7 03/12/2020 10:24 AM    Bilirubin, total 0.2 03/12/2020 10:24 AM    Alk. phosphatase 78 03/12/2020 10:24 AM    Protein, total 7.2 03/12/2020 10:24 AM    Albumin 3.7 03/12/2020 10:24 AM    Globulin 3.5 03/12/2020 10:24 AM    A-G Ratio 1.1 03/12/2020 10:24 AM    ALT (SGPT) 33 03/12/2020 10:24 AM    AST (SGOT) 32 03/12/2020 10:24 AM     Lab Results   Component Value Date/Time    Cholesterol, total 171 03/12/2020 10:24 AM    HDL Cholesterol 83 (H) 03/12/2020 10:24 AM    LDL, calculated 74.8 03/12/2020 10:24 AM    VLDL, calculated 13.2 03/12/2020 10:24 AM    Triglyceride 66 03/12/2020 10:24 AM    CHOL/HDL Ratio 2.1 03/12/2020 10:24 AM     \  Lab Results   Component Value Date/Time    TSH 4.75 (H) 07/30/2018 11:51 PM     Lab Results   Component Value Date/Time    Hemoglobin A1c 6.1 (H) 08/02/2016 02:20 AM       Prior to Admission medications    Medication Sig Start Date End Date Taking? Authorizing Provider   naproxen (NAPROSYN) 500 mg tablet Take 1 Tab by mouth two (2) times daily (with meals). 1/11/21  Yes Mir Mcocy MD   amLODIPine (NORVASC) 10 mg tablet Take 1 Tab by mouth daily. Indications: high blood pressure 10/27/20  Yes Mir Mccoy MD   lisinopriL (PRINIVIL, ZESTRIL) 40 mg tablet Take 1 Tab by mouth daily. 10/27/20  Yes Mir Mccoy MD   metoprolol tartrate (LOPRESSOR) 100 mg IR tablet Take 1 Tab by mouth two (2) times a day. 10/27/20  Yes Mir Mccoy MD   potassium chloride (K-DUR, KLOR-CON) 20 mEq tablet Take 1 Tab by mouth two (2) times a day.  11/21/19  Yes Pita Huerta, Charles Manzanares MD   chlorthalidone (HYGROTEN) 25 mg tablet Take 1 Tab by mouth daily. 6/10/19  Yes Margo Finley PA   hydrALAZINE (APRESOLINE) 100 mg tablet Take 1 Tab by mouth three (3) times daily. 6/10/19  Yes Margo Finley PA   ferrous sulfate (IRON) 325 mg (65 mg iron) EC tablet Take 1 Tab by mouth three (3) times daily (with meals). 9/21/18  Yes Rema Moura MD   ibuprofen (MOTRIN) 800 mg tablet TAKE 1 TABLET BY MOUTH EVERY 8 HOURS AS NEEDED FOR PAIN 8/25/20 1/11/21  Kiarra Medina MD   cyclobenzaprine (FLEXERIL) 10 mg tablet Take 1 Tab by mouth nightly as needed for Muscle Spasm(s). 11/21/19   Latrice Adams MD     Patient Active Problem List    Diagnosis Date Noted    Non-intractable vomiting 02/19/2020    Flu-like symptoms 02/19/2020    Gastroenteritis 02/19/2020    Laryngitis 11/08/2019    Essential hypertension 11/08/2019    Iron deficiency anemia due to chronic blood loss 11/05/2018    Lumbosacral spondylosis without myelopathy 08/09/2018    DDD (degenerative disc disease), lumbar 08/09/2018    Lumbar neuritis 08/09/2018    Scoliosis of lumbar spine 08/09/2018    Essential hypertension with goal blood pressure less than 140/90 04/18/2018    Anxiety 04/18/2018    Numbness and tingling 08/01/2016    Acute cerebral infarction ([facility] [address] 08/01/2016    Back pain 05/15/2014     Current Outpatient Medications   Medication Sig Dispense Refill    naproxen (NAPROSYN) 500 mg tablet Take 1 Tab by mouth two (2) times daily (with meals). 60 Tab 1    amLODIPine (NORVASC) 10 mg tablet Take 1 Tab by mouth daily. Indications: high blood pressure 90 Tab 0    lisinopriL (PRINIVIL, ZESTRIL) 40 mg tablet Take 1 Tab by mouth daily. 90 Tab 0    metoprolol tartrate (LOPRESSOR) 100 mg IR tablet Take 1 Tab by mouth two (2) times a day. 180 Tab 0    potassium chloride (K-DUR, KLOR-CON) 20 mEq tablet Take 1 Tab by mouth two (2) times a day.  90 Tab 0    chlorthalidone (HYGROTEN) 25 mg tablet Take 1 Tab by mouth daily. 90 Tab 0    hydrALAZINE (APRESOLINE) 100 mg tablet Take 1 Tab by mouth three (3) times daily. 90 Tab 2    ferrous sulfate (IRON) 325 mg (65 mg iron) EC tablet Take 1 Tab by mouth three (3) times daily (with meals). 30 Tab 0    cyclobenzaprine (FLEXERIL) 10 mg tablet Take 1 Tab by mouth nightly as needed for Muscle Spasm(s). 30 Tab 0     No Known Allergies  Past Medical History:   Diagnosis Date    Abnormal mammogram 3/2016    BIRADS 3 right breast    Hypertension     Stroke [facility]) 2016    CVA     Social History     Tobacco Use    Smoking status: Former Smoker     Quit date: 1999     Years since quittin.1    Smokeless tobacco: Never Used   Substance Use Topics    Alcohol use: No       ROS    Objective:   No flowsheet data found. General: alert, cooperative, no distress   Mental  status: normal mood, behavior, speech, dress, motor activity, and thought processes, able to follow commands   HENT: NCAT   Neck: no visualized mass   Resp: no respiratory distress   Neuro: no gross deficits   Skin: no discoloration or lesions of concern on visible areas   Psychiatric: normal affect, consistent with stated mood, no evidence of hallucinations     Additional exam findings: We discussed the expected course, resolution and complications of the diagnosis(es) in detail. Medication risks, benefits, costs, interactions, and alternatives were discussed as indicated. I advised her to contact the office if her condition worsens, changes or fails to improve as anticipated. She expressed understanding with the diagnosis(es) and plan. [employer], who was evaluated through a patient-initiated, synchronous (real-time) audio-video encounter, and/or her healthcare decision maker, is aware that it is a billable service, with coverage as determined by her insurance carrier. She provided verbal consent to proceed: Yes, and patient identification was verified.  It was conducted pursuant to the emergency declaration under the [facility], [facility] authority and the [employer] and [employer] General Act. A caregiver was present when appropriate. Ability to conduct physical exam was limited. I was in the office. The patient was at home.       Mynor Dalton MD

## 2021-02-03 ENCOUNTER — HOSPITAL ENCOUNTER (OUTPATIENT)
Dept: MAMMOGRAPHY | Age: 56
Discharge: HOME | End: 2021-02-03
Attending: FAMILY MEDICINE
Payer: COMMERCIAL

## 2021-02-03 DIAGNOSIS — Z12.31 ENCOUNTER FOR SCREENING MAMMOGRAM FOR MALIGNANT NEOPLASM OF BREAST: ICD-10-CM

## 2021-02-03 DIAGNOSIS — Z12.31 VISIT FOR SCREENING MAMMOGRAM: ICD-10-CM

## 2021-02-03 PROCEDURE — 77063 BREAST TOMOSYNTHESIS BI: CPT

## 2021-05-27 DIAGNOSIS — I10 ESSENTIAL HYPERTENSION: ICD-10-CM

## 2021-05-27 RX ORDER — LISINOPRIL 40 MG/1
TABLET ORAL
Qty: 90 TABLET | Refills: 0 | Status: SHIPPED | OUTPATIENT
Start: 2021-05-27 | End: 2022-01-03

## 2021-05-27 RX ORDER — METOPROLOL TARTRATE 100 MG/1
TABLET ORAL
Qty: 180 TABLET | Refills: 0 | Status: SHIPPED | OUTPATIENT
Start: 2021-05-27 | End: 2022-01-03

## 2021-05-27 RX ORDER — AMLODIPINE BESYLATE 10 MG/1
TABLET ORAL
Qty: 90 TABLET | Refills: 0 | Status: SHIPPED | OUTPATIENT
Start: 2021-05-27 | End: 2022-01-03

## 2021-06-11 NOTE — PROGRESS NOTES
[phone] (home) 2 patient identifiers verified with Alberto French was advised that her x-ray results shoe a 5th toe fracture, continue to ledy tape (conservative treatment), avoid excessive weight bearing, wear hard sole shoes and to follow up in 3 weeks.
5th toe fracture. For now continue ledy tape. Conservative treatment. Avoid excessive weight bearing. Wear hard sole shoes if she has . F/u in 3 wks.
Moderate: Comprehensive teaching

## 2021-08-03 PROBLEM — I10 ESSENTIAL HYPERTENSION: Status: RESOLVED | Noted: 2019-11-08 | Resolved: 2021-08-03

## 2021-11-20 DIAGNOSIS — I10 ESSENTIAL HYPERTENSION: ICD-10-CM

## 2021-11-22 RX ORDER — AMLODIPINE BESYLATE 10 MG/1
TABLET ORAL
Qty: 90 TABLET | Refills: 0 | OUTPATIENT
Start: 2021-11-22

## 2021-11-22 RX ORDER — IBUPROFEN 800 MG/1
TABLET ORAL
Qty: 30 TABLET | Refills: 0 | OUTPATIENT
Start: 2021-11-22

## 2021-11-22 RX ORDER — METOPROLOL TARTRATE 100 MG/1
TABLET ORAL
Qty: 180 TABLET | Refills: 0 | OUTPATIENT
Start: 2021-11-22

## 2021-11-22 RX ORDER — LISINOPRIL 40 MG/1
TABLET ORAL
Qty: 90 TABLET | Refills: 0 | OUTPATIENT
Start: 2021-11-22

## 2021-11-22 NOTE — TELEPHONE ENCOUNTER
Pt has made an appt for 12/28/2021 and would like to know if she can get her RX s refilled to hold her till her appt . Metoprolol Tartrate 100 mg IR Tablets, Lisinopril 40 Mg Tablet Ibuprofen 800 mg Tablets , Amlodipine 10 mg Tablets. Please advise.  Macy Baker [phone]

## 2021-12-28 ENCOUNTER — OFFICE VISIT (OUTPATIENT)
Dept: FAMILY MEDICINE CLINIC | Age: 56
End: 2021-12-28
Payer: COMMERCIAL

## 2021-12-28 VITALS
SYSTOLIC BLOOD PRESSURE: 166 MMHG | RESPIRATION RATE: 16 BRPM | DIASTOLIC BLOOD PRESSURE: 110 MMHG | HEART RATE: 76 BPM | HEIGHT: 64 IN | TEMPERATURE: 97.4 F | BODY MASS INDEX: 23.56 KG/M2 | WEIGHT: 138 LBS | OXYGEN SATURATION: 99 %

## 2021-12-28 DIAGNOSIS — G89.29 CHRONIC BILATERAL LOW BACK PAIN WITHOUT SCIATICA: ICD-10-CM

## 2021-12-28 DIAGNOSIS — M79.641 PAIN OF RIGHT HAND: ICD-10-CM

## 2021-12-28 DIAGNOSIS — F41.9 ANXIETY: ICD-10-CM

## 2021-12-28 DIAGNOSIS — E61.1 IRON DEFICIENCY: ICD-10-CM

## 2021-12-28 DIAGNOSIS — Z13.1 SCREENING FOR DIABETES MELLITUS: ICD-10-CM

## 2021-12-28 DIAGNOSIS — R53.82 CHRONIC FATIGUE: ICD-10-CM

## 2021-12-28 DIAGNOSIS — I10 ESSENTIAL HYPERTENSION: ICD-10-CM

## 2021-12-28 DIAGNOSIS — R52 PAIN OF MULTIPLE SITES: ICD-10-CM

## 2021-12-28 DIAGNOSIS — Z13.220 SCREENING FOR HYPERLIPIDEMIA: ICD-10-CM

## 2021-12-28 DIAGNOSIS — I10 ESSENTIAL HYPERTENSION WITH GOAL BLOOD PRESSURE LESS THAN 140/90: Primary | ICD-10-CM

## 2021-12-28 DIAGNOSIS — M54.50 CHRONIC BILATERAL LOW BACK PAIN WITHOUT SCIATICA: ICD-10-CM

## 2021-12-28 DIAGNOSIS — R20.0 NUMBNESS AND TINGLING: ICD-10-CM

## 2021-12-28 DIAGNOSIS — M51.36 DDD (DEGENERATIVE DISC DISEASE), LUMBAR: ICD-10-CM

## 2021-12-28 DIAGNOSIS — M79.642 PAIN OF LEFT HAND: ICD-10-CM

## 2021-12-28 DIAGNOSIS — R20.2 NUMBNESS AND TINGLING: ICD-10-CM

## 2021-12-28 PROCEDURE — 99214 OFFICE O/P EST MOD 30 MIN: CPT | Performed by: FAMILY MEDICINE

## 2021-12-28 RX ORDER — CYCLOBENZAPRINE HCL 10 MG
10 TABLET ORAL
Qty: 30 TABLET | Refills: 1 | Status: SHIPPED | OUTPATIENT
Start: 2021-12-28 | End: 2022-09-27 | Stop reason: SDUPTHER

## 2021-12-28 RX ORDER — NAPROXEN 500 MG/1
500 TABLET ORAL 2 TIMES DAILY WITH MEALS
Qty: 60 TABLET | Refills: 1 | Status: SHIPPED | OUTPATIENT
Start: 2021-12-28 | End: 2022-08-24 | Stop reason: SDUPTHER

## 2021-12-28 RX ORDER — PREDNISONE 5 MG/1
TABLET ORAL
Qty: 21 TABLET | Refills: 0 | Status: SHIPPED | OUTPATIENT
Start: 2021-12-28 | End: 2022-09-27

## 2021-12-28 RX ORDER — HYDRALAZINE HYDROCHLORIDE 100 MG/1
100 TABLET, FILM COATED ORAL 3 TIMES DAILY
Qty: 180 TABLET | Refills: 1 | Status: SHIPPED | OUTPATIENT
Start: 2021-12-28

## 2021-12-28 RX ORDER — CHLORTHALIDONE 25 MG/1
25 TABLET ORAL DAILY
Qty: 90 TABLET | Refills: 1 | Status: SHIPPED | OUTPATIENT
Start: 2021-12-28

## 2021-12-28 RX ORDER — CYCLOBENZAPRINE HCL 10 MG
10 TABLET ORAL
Qty: 30 TABLET | Refills: 0 | Status: CANCELLED | OUTPATIENT
Start: 2021-12-28

## 2021-12-28 NOTE — PATIENT INSTRUCTIONS
Low Sodium Diet (2,000 Milligram): Care Instructions  Overview     Limiting sodium can be an important part of managing some health problems. The most common source of sodium is salt. People get most of the salt in their diet from canned, prepared, and packaged foods. Fast food and restaurant meals also are very high in sodium. Your doctor will probably limit your sodium to less than 2,000 milligrams (mg) a day. This limit counts all the sodium in prepared and packaged foods and any salt you add to your food. Follow-up care is a key part of your treatment and safety. Be sure to make and go to all appointments, and call your doctor if you are having problems. It's also a good idea to know your test results and keep a list of the medicines you take. How can you care for yourself at home? Read food labels  · Read labels on cans and food packages. The labels tell you how much sodium is in each serving. Make sure that you look at the serving size. If you eat more than the serving size, you have eaten more sodium. · Food labels also tell you the Percent Daily Value for sodium. Choose products with low Percent Daily Values for sodium. · Be aware that sodium can come in forms other than salt, including monosodium glutamate (MSG), sodium citrate, and sodium bicarbonate (baking soda). MSG is often added to Asian food. When you eat out, you can sometimes ask for food without MSG or added salt. Buy low-sodium foods  · Buy foods that are labeled \"unsalted\" (no salt added), \"sodium-free\" (less than 5 mg of sodium per serving), or \"low-sodium\" (140 mg or less of sodium per serving). Foods labeled \"reduced-sodium\" and \"light sodium\" may still have too much sodium. Be sure to read the label to see how much sodium you are getting. · Buy fresh vegetables, or frozen vegetables without added sauces. Buy low-sodium versions of canned vegetables, soups, and other canned goods.   Prepare low-sodium meals  · Cut back on the amount of salt you use in cooking. This will help you adjust to the taste. Do not add salt after cooking. One teaspoon of salt has about 2,300 mg of sodium. · Take the salt shaker off the table. · Flavor your food with garlic, lemon juice, onion, vinegar, herbs, and spices. Do not use soy sauce, lite soy sauce, steak sauce, onion salt, garlic salt, celery salt, or ketchup on your food. · Use low-sodium salad dressings, sauces, and ketchup. Or make your own salad dressings and sauces without adding salt. · Use less salt (or none) when recipes call for it. You can often use half the salt a recipe calls for without losing flavor. Other foods such as rice, pasta, and grains do not need added salt. · Rinse canned vegetables, and cook them in fresh water. This removes some--but not all--of the salt. · Avoid water that is naturally high in sodium or that has been treated with water softeners, which add sodium. If you buy bottled water, read the label and choose a sodium-free brand. Avoid high-sodium foods  · Avoid eating:  ? Smoked, cured, salted, and canned meat, fish, and poultry. ? Ham, murry, hot dogs, and luncheon meats. ? Regular, hard, and processed cheese and regular peanut butter. ? Crackers with salted tops, and other salted snack foods such as pretzels, chips, and salted popcorn. ? Frozen prepared meals, unless labeled low-sodium. ? Canned and dried soups, broths, and bouillon, unless labeled sodium-free or low-sodium. ? Canned vegetables, unless labeled sodium-free or low-sodium. ? Western An[employer]te fries, pizza, tacos, and other fast foods. ? Pickles, olives, ketchup, and other condiments, especially soy sauce, unless labeled sodium-free or low-sodium. Where can you learn more? Go to http://www.gray.com/  Enter V843 in the search box to learn more about \"Low Sodium Diet (2,000 Milligram): Care Instructions. \"  Current as of: December 17, 2020               Content Version: 13.0  © 2006-2021 [employer]. Care instructions adapted under license by "[employer] (which disclaims liability or warranty for this information). If you have questions about a medical condition or this instruction, always ask your healthcare professional. [address] any warranty or liability for your use of this information. Back Pain: Care Instructions  Your Care Instructions     Back pain has many possible causes. It is often related to problems with muscles and ligaments of the back. It may also be related to problems with the nerves, discs, or bones of the back. Moving, lifting, standing, sitting, or sleeping in an awkward way can strain the back. Sometimes you don't notice the injury until later. Arthritis is another common cause of back pain. Although it may hurt a lot, back pain usually improves on its own within several weeks. Most people recover in 12 weeks or less. Using good home treatment and being careful not to stress your back can help you feel better sooner. Follow-up care is a key part of your treatment and safety. Be sure to make and go to all appointments, and call your doctor if you are having problems. It's also a good idea to know your test results and keep a list of the medicines you take. How can you care for yourself at home? · Sit or lie in positions that are most comfortable and reduce your pain. Try one of these positions when you lie down:  ? Lie on your back with your knees bent and supported by large pillows. ? Lie on the floor with your legs on the seat of a sofa or chair. ? Lie on your side with your knees and hips bent and a pillow between your legs. ? Lie on your stomach if it does not make pain worse. · Do not sit up in bed, and avoid soft couches and twisted positions. Bed rest can help relieve pain at first, but it delays healing. Avoid bed rest after the first day of back pain.   · Change positions every 30 minutes. If you must sit for long periods of time, take breaks from sitting. Get up and walk around, or lie in a comfortable position. · Try using a heating pad on a low or medium setting for 15 to 20 minutes every 2 or 3 hours. Try a warm shower in place of one session with the heating pad. · You can also try an ice pack for 10 to 15 minutes every 2 to 3 hours. Put a thin cloth between the ice pack and your skin. · Take pain medicines exactly as directed. ? If the doctor gave you a prescription medicine for pain, take it as prescribed. ? If you are not taking a prescription pain medicine, ask your doctor if you can take an over-the-counter medicine. · Take short walks several times a day. You can start with 5 to 10 minutes, 3 or 4 times a day, and work up to longer walks. Walk on level surfaces and avoid hills and stairs until your back is better. · Return to work and other activities as soon as you can. Continued rest without activity is usually not good for your back. · To prevent future back pain, do exercises to stretch and strengthen your back and stomach. Learn how to use good posture, safe lifting techniques, and proper body mechanics. When should you call for help? Call your doctor now or seek immediate medical care if:    · You have new or worsening numbness in your legs.     · You have new or worsening weakness in your legs. (This could make it hard to stand up.)     · You lose control of your bladder or bowels. Watch closely for changes in your health, and be sure to contact your doctor if:    · You have a fever, lose weight, or don't feel well.     · You do not get better as expected. Where can you learn more? Go to http://www.moreno.com/  Enter I594 in the search box to learn more about \"Back Pain: Care Instructions. \"  Current as of: July 1, 2021               Content Version: 13.0  © 8141-6873 [employer].    Care instructions adapted under license by [employer] (which disclaims liability or warranty for this information). If you have questions about a medical condition or this instruction, always ask your healthcare professional. [address] any warranty or liability for your use of this information.

## 2021-12-28 NOTE — PROGRESS NOTES
HISTORY OF PRESENT ILLNESS  Kim Prather is a 64 y.o. female. HPI: Here for follow-up. Overdue. History of hypertension. Noted elevated blood pressure. Repeat was elevated as well. Currently she was having back spasm. Probably due to pain. Also noted that she will not taking hydralazine and chlorthalidone as she ran out of it. Discussed the importance of compliance with medication. Her back spasm is moderate to severe in intensity. Occurs on and off. Sometimes radiates to lower extremity. Having on and off numbness in the lower extremity. No weakness. Not using any support to ambulate. No loss of urine or bowel control. Stated she has been not taking any medication regarding back spasm and asking for muscle relaxant. History of lumbar degenerative disc disease. Has seen spine center few years back. Currently not following any specialist.  Also complaining of hand joint pain, feet pain, back pain. Has warranted inflammatory marker but she has not completed blood work yet. She will do it today. Also history of iron deficiency anemia. Will recheck labs. She is not taking any supplement at this time. Postmenopausal.  Also having on and off fatigue. No cold cough wheezing or any fever. No headache or dizziness. No chest pain or shortness of breath. No nausea vomiting or abdominal pain. No urinary or bowel complaint at this time. Mood has been stable. Visit Vitals  BP (!) 166/110 (BP 1 Location: Right upper arm, BP Patient Position: Sitting, BP Cuff Size: Adult)   Pulse 76   Temp 97.4 °F (36.3 °C) (Temporal)   Resp 16   Ht 5' 4\" (1.626 m)   Wt 138 lb (62.6 kg)   SpO2 99%   BMI 23.69 kg/m²     Review medication list, vitals, problem list,allergies.    Lab Results   Component Value Date/Time    WBC 3.4 (L) 03/12/2020 10:24 AM    HGB 9.3 (L) 03/12/2020 10:24 AM    HCT 30.7 (L) 03/12/2020 10:24 AM    PLATELET 239 30/39/6719 10:24 AM    MCV 76.8 03/12/2020 10:24 AM     Lab Results Component Value Date/Time    Sodium 142 03/12/2020 10:24 AM    Potassium 3.5 03/12/2020 10:24 AM    Chloride 108 03/12/2020 10:24 AM    CO2 29 03/12/2020 10:24 AM    Anion gap 5 03/12/2020 10:24 AM    Glucose 90 03/12/2020 10:24 AM    BUN 12 03/12/2020 10:24 AM    Creatinine 0.64 03/12/2020 10:24 AM    BUN/Creatinine ratio 19 03/12/2020 10:24 AM    GFR est AA >60 03/12/2020 10:24 AM    GFR est non-AA >60 03/12/2020 10:24 AM    Calcium 8.7 03/12/2020 10:24 AM    Bilirubin, total 0.2 03/12/2020 10:24 AM    Alk. phosphatase 78 03/12/2020 10:24 AM    Protein, total 7.2 03/12/2020 10:24 AM    Albumin 3.7 03/12/2020 10:24 AM    Globulin 3.5 03/12/2020 10:24 AM    A-G Ratio 1.1 03/12/2020 10:24 AM    ALT (SGPT) 33 03/12/2020 10:24 AM    AST (SGOT) 32 03/12/2020 10:24 AM     Lab Results   Component Value Date/Time    Cholesterol, total 171 03/12/2020 10:24 AM    HDL Cholesterol 83 (H) 03/12/2020 10:24 AM    LDL, calculated 74.8 03/12/2020 10:24 AM    VLDL, calculated 13.2 03/12/2020 10:24 AM    Triglyceride 66 03/12/2020 10:24 AM    CHOL/HDL Ratio 2.1 03/12/2020 10:24 AM     Lab Results   Component Value Date/Time    TSH 4.75 (H) 07/30/2018 11:51 PM     Lab Results   Component Value Date/Time    Hemoglobin A1c 6.1 (H) 08/02/2016 02:20 AM       ROS: See HPI    Physical Exam  Constitutional:       General: She is not in acute distress. Cardiovascular:      Rate and Rhythm: Normal rate and regular rhythm. Heart sounds: Normal heart sounds. Abdominal:      General: Bowel sounds are normal.      Palpations: Abdomen is soft. Tenderness: There is no abdominal tenderness. Musculoskeletal:         General: No swelling. Cervical back: Neck supple. Neurological:      Mental Status: She is oriented to person, place, and time. Psychiatric:         Behavior: Behavior normal.         ASSESSMENT and PLAN    ICD-10-CM ICD-9-CM    1.  Essential hypertension with goal blood pressure less than 140/90: Elevated blood pressure. She has been not taking chlorthalidone and hydralazine. Given refill. Follow-up next visit. Advised low-salt diet: At this time giving muscle relaxant. Sending to physical therapy and starting the prednisone I10 401.9    2. Chronic bilateral low back pain without sciatica advised patient to take the prednisone after doing the blood work. Also sending to spine center M54.50 724.2 REFERRAL TO ORTHOPEDICS    G89.29 338.29 cyclobenzaprine (FLEXERIL) 10 mg tablet      REFERRAL TO PHYSICAL THERAPY      predniSONE (STERAPRED) 5 mg dose pack   3. Anxiety: Fairly stable F41.9 300.00    4. Numbness and tingling: On and off. Probably due to lumbar radiculopathy R20.0 782.0     R20.2     5. Pain of multiple sites: At this time will order labs. We will follow-up after results R52 780.96     hand joints, back , feet pain    6. Essential hypertension: Elevated blood pressure. Not taking medication with compliance and also could be related to pain. Follow-up next visit I10 401.9 hydrALAZINE (APRESOLINE) 100 mg tablet   7. Iron deficiency: Recheck labs. Also advised multivitamin E61.1 280.9    8. Chronic fatigue: Recheck labs R53.82 780.79 SED RATE (ESR)      CBC W/O DIFF      METABOLIC PANEL, COMPREHENSIVE      TSH 3RD GENERATION      VITAMIN D, 25 HYDROXY   9. Screening for diabetes mellitus  Z13.1 V77.1 HEMOGLOBIN A1C WITH EAG   10. Screening for hyperlipidemia  Z13.220 V77.91 LIPID PANEL   11. Pain of left hand  M79.642 729.5 naproxen (NAPROSYN) 500 mg tablet   12. Pain of right hand  M79.641 729.5 naproxen (NAPROSYN) 500 mg tablet   13. DDD (degenerative disc disease), lumbar  M51.36 722.52 naproxen (NAPROSYN) 500 mg tablet   Patient understood agree with the plan      Follow-up and Dispositions    · Return in about 2 weeks (around 1/11/2022). Please note that this dictation was completed with [employer] the [employer] voice recognition software.   Quite often unanticipated grammatical, syntax, homophones, and other interpretive errors are inadvertently transcribed by the computer software. Please disregard these errors. Please excuse any errors that have escaped final proofreading.

## 2022-01-03 ENCOUNTER — HOSPITAL ENCOUNTER (OUTPATIENT)
Dept: LAB | Age: 57
Discharge: HOME | End: 2022-01-03
Payer: COMMERCIAL

## 2022-01-03 DIAGNOSIS — I10 ESSENTIAL HYPERTENSION: ICD-10-CM

## 2022-01-03 LAB
25(OH)D3 SERPL-MCNC: 9.9 NG/ML (ref 30–100)
ALBUMIN SERPL-MCNC: 4 G/DL (ref 3.4–5)
ALBUMIN/GLOB SERPL: 1.2 {RATIO} (ref 0.8–1.7)
ALP SERPL-CCNC: 87 U/L (ref 45–117)
ALT SERPL-CCNC: 31 U/L (ref 13–56)
ANION GAP SERPL CALC-SCNC: 6 MMOL/L (ref 3–18)
AST SERPL-CCNC: 36 U/L (ref 10–38)
BILIRUB SERPL-MCNC: 0.3 MG/DL (ref 0.2–1)
BUN SERPL-MCNC: 6 MG/DL (ref 7–18)
BUN/CREAT SERPL: 10 (ref 12–20)
CALCIUM SERPL-MCNC: 8.8 MG/DL (ref 8.5–10.1)
CHLORIDE SERPL-SCNC: 105 MMOL/L (ref 100–111)
CHOLEST SERPL-MCNC: 162 MG/DL
CO2 SERPL-SCNC: 29 MMOL/L (ref 21–32)
CREAT SERPL-MCNC: 0.61 MG/DL (ref 0.6–1.3)
ERYTHROCYTE [DISTWIDTH] IN BLOOD BY AUTOMATED COUNT: 15.8 % (ref 11.6–14.5)
ERYTHROCYTE [SEDIMENTATION RATE] IN BLOOD: 46 MM/HR (ref 0–30)
EST. AVERAGE GLUCOSE BLD GHB EST-MCNC: 126 MG/DL
GLOBULIN SER CALC-MCNC: 3.4 G/DL (ref 2–4)
GLUCOSE SERPL-MCNC: 102 MG/DL (ref 74–99)
HBA1C MFR BLD: 6 % (ref 4.2–5.6)
HCT VFR BLD AUTO: 36.8 % (ref 35–45)
HDLC SERPL-MCNC: 61 MG/DL (ref 40–60)
HDLC SERPL: 2.7 {RATIO} (ref 0–5)
HGB BLD-MCNC: 11.1 G/DL (ref 12–16)
LDLC SERPL CALC-MCNC: 86.2 MG/DL (ref 0–100)
LIPID PROFILE,FLP: ABNORMAL
MCH RBC QN AUTO: 24.7 PG (ref 24–34)
MCHC RBC AUTO-ENTMCNC: 30.2 G/DL (ref 31–37)
MCV RBC AUTO: 81.8 FL (ref 78–100)
NRBC # BLD: 0 K/UL (ref 0–0.01)
NRBC BLD-RTO: 0 PER 100 WBC
PLATELET # BLD AUTO: 197 K/UL (ref 135–420)
PMV BLD AUTO: 10.9 FL (ref 9.2–11.8)
POTASSIUM SERPL-SCNC: 3.1 MMOL/L (ref 3.5–5.5)
PROT SERPL-MCNC: 7.4 G/DL (ref 6.4–8.2)
RBC # BLD AUTO: 4.5 M/UL (ref 4.2–5.3)
SODIUM SERPL-SCNC: 140 MMOL/L (ref 136–145)
TRIGL SERPL-MCNC: 74 MG/DL (ref ?–150)
TSH SERPL DL<=0.05 MIU/L-ACNC: 2.57 UIU/ML (ref 0.36–3.74)
VLDLC SERPL CALC-MCNC: 14.8 MG/DL
WBC # BLD AUTO: 1.5 K/UL (ref 4.6–13.2)

## 2022-01-03 PROCEDURE — 80053 COMPREHEN METABOLIC PANEL: CPT

## 2022-01-03 PROCEDURE — 80061 LIPID PANEL: CPT

## 2022-01-03 PROCEDURE — 82306 VITAMIN D 25 HYDROXY: CPT

## 2022-01-03 PROCEDURE — 83036 HEMOGLOBIN GLYCOSYLATED A1C: CPT

## 2022-01-03 PROCEDURE — 36415 COLL VENOUS BLD VENIPUNCTURE: CPT

## 2022-01-03 PROCEDURE — 85652 RBC SED RATE AUTOMATED: CPT

## 2022-01-03 PROCEDURE — 85027 COMPLETE CBC AUTOMATED: CPT

## 2022-01-03 PROCEDURE — 84443 ASSAY THYROID STIM HORMONE: CPT

## 2022-01-03 RX ORDER — LISINOPRIL 40 MG/1
TABLET ORAL
Qty: 90 TABLET | Refills: 0 | Status: SHIPPED | OUTPATIENT
Start: 2022-01-03 | End: 2022-08-24 | Stop reason: SDUPTHER

## 2022-01-03 RX ORDER — METOPROLOL TARTRATE 100 MG/1
TABLET ORAL
Qty: 180 TABLET | Refills: 0 | Status: SHIPPED | OUTPATIENT
Start: 2022-01-03

## 2022-01-03 RX ORDER — AMLODIPINE BESYLATE 10 MG/1
TABLET ORAL
Qty: 90 TABLET | Refills: 0 | Status: SHIPPED | OUTPATIENT
Start: 2022-01-03 | End: 2022-08-24 | Stop reason: SDUPTHER

## 2022-01-04 DIAGNOSIS — M25.50 PAIN IN JOINT INVOLVING MULTIPLE SITES: ICD-10-CM

## 2022-01-04 DIAGNOSIS — R79.89 ABNORMAL CBC: Primary | ICD-10-CM

## 2022-01-04 DIAGNOSIS — E55.9 VITAMIN D DEFICIENCY: ICD-10-CM

## 2022-01-04 DIAGNOSIS — R70.0 ESR RAISED: ICD-10-CM

## 2022-01-04 RX ORDER — ERGOCALCIFEROL 1.25 MG/1
50000 CAPSULE ORAL
Qty: 12 CAPSULE | Refills: 0 | Status: SHIPPED | OUTPATIENT
Start: 2022-01-04 | End: 2022-09-28 | Stop reason: SDUPTHER

## 2022-01-04 NOTE — PROGRESS NOTES
A1C in prediabetic range. Very important to do life style modification, diet modification and exercise as tolerated. Aware that having pains and aches . Also elevated ESR and with her joint pains I am sending her to rheumatology. Very low vitamin D. Sending drisdol and recheck in 3 months. Once done with drisdol daily 5000 units of vitamin d 3 otc. Thyroid function wnl at this time as it was elevated in the past. Very low wbc and anemia. Sending to hematology and also advise her to make an appt with GI to get colonoscopy scheduled as well. Keep follow up to discuss this lab result further.

## 2022-01-11 ENCOUNTER — OFFICE VISIT (OUTPATIENT)
Dept: FAMILY MEDICINE CLINIC | Age: 57
End: 2022-01-11
Payer: COMMERCIAL

## 2022-01-11 VITALS
TEMPERATURE: 97.7 F | HEART RATE: 62 BPM | HEIGHT: 64 IN | OXYGEN SATURATION: 99 % | BODY MASS INDEX: 22.88 KG/M2 | DIASTOLIC BLOOD PRESSURE: 128 MMHG | RESPIRATION RATE: 16 BRPM | SYSTOLIC BLOOD PRESSURE: 218 MMHG | WEIGHT: 134 LBS

## 2022-01-11 DIAGNOSIS — I99.8 POORLY CONTROLLED BLOOD PRESSURE: Primary | ICD-10-CM

## 2022-01-11 DIAGNOSIS — R73.01 IMPAIRED FASTING BLOOD SUGAR: ICD-10-CM

## 2022-01-11 DIAGNOSIS — D72.819 LEUKOPENIA, UNSPECIFIED TYPE: ICD-10-CM

## 2022-01-11 DIAGNOSIS — I27.20 PULMONARY HYPERTENSION (HCC): ICD-10-CM

## 2022-01-11 DIAGNOSIS — R70.0 ESR RAISED: ICD-10-CM

## 2022-01-11 DIAGNOSIS — E55.9 VITAMIN D DEFICIENCY: ICD-10-CM

## 2022-01-11 PROCEDURE — 99214 OFFICE O/P EST MOD 30 MIN: CPT | Performed by: FAMILY MEDICINE

## 2022-01-11 RX ORDER — CLONIDINE HYDROCHLORIDE 0.1 MG/1
0.1 TABLET ORAL 2 TIMES DAILY
Qty: 60 TABLET | Refills: 1 | Status: SHIPPED | OUTPATIENT
Start: 2022-01-11

## 2022-01-11 NOTE — PROGRESS NOTES
Chief Complaint   Patient presents with    Hypertension    Anxiety     states every day    Fatigue     trying to reduce hours at work- but is limited until 3/1/22    Hand Pain    Results     1. \"Have you been to the ER, urgent care clinic since your last visit? Hospitalized since your last visit? \" No    2. \"Have you seen or consulted any other health care providers outside of the [address] [place] [address] since your last visit? \" No     3. For patients aged 39-70: Has the patient had a colonoscopy / FIT/ Cologuard? No     If the patient is female:    4. For patients aged 41-77: Has the patient had a mammogram within the past 2 years? Yes,  satisfied with blue hyperlink    5. For patients aged 21-65: Has the patient had a pap smear?  No

## 2022-01-11 NOTE — PROGRESS NOTES
HISTORY OF PRESENT ILLNESS  Kim Whiting is a 64 y.o. female. HPI: Here for follow-up for blood pressure check. Elevated blood pressure. Repeat was elevated as well. At this time she is sitting  without any acute distress but she is it is having discomfort from lower back spasm. She has been working longer hours and that has made her back spasm worse. She has TENS unit but does not have a battery so at this time patient mentioned that she wants to go home and rest and put the TENS unit on. She denies any headache or dizziness. No vision change. No nausea vomiting or extremity weakness. No chest pain or shortness of breath. No palpitation or diaphoresis. No appetite or weight changes. No leg swelling. No unusual fatigue. No loss of urine or bowel control. I have given her recommendation is going to emergency room to evaluate it further but patient wanted to go home. Leave AGAINST MEDICAL ADVICE paper has been signed. Nurse Finn Contreras was present during the conversation. Per patient she has been checking blood pressure at home and systolic blood pressure comes in 110s and 120s. And the diastolic in 07N. [facility]. If any red flag sign she needs to go to ER. She understood it very well. She understands the risk of stroke and cardiac event with poorly controlled blood pressure. When she feels better she will complete EKG. She is aware we will send her to cardiology for further evaluation. Visit Vitals  BP (!) 218/128 (BP 1 Location: Left arm, BP Patient Position: Sitting, BP Cuff Size: Adult)   Pulse 62   Temp 97.7 °F (36.5 °C) (Temporal)   Resp 16   Ht 5' 4\" (1.626 m)   Wt 134 lb (60.8 kg)   SpO2 99%   BMI 23.00 kg/m²     Review medication list, vitals, problem list,allergies. I have reviewed the labs.   Lab Results   Component Value Date/Time    WBC 1.5 (L) 01/03/2022 02:02 PM    HGB 11.1 (L) 01/03/2022 02:02 PM    HCT 36.8 01/03/2022 02:02 PM    PLATELET 399 07/95/5013 02:02 PM    MCV 81.8 01/03/2022 02:02 PM     Lab Results   Component Value Date/Time    Sodium 140 01/03/2022 02:02 PM    Potassium 3.1 (L) 01/03/2022 02:02 PM    Chloride 105 01/03/2022 02:02 PM    CO2 29 01/03/2022 02:02 PM    Anion gap 6 01/03/2022 02:02 PM    Glucose 102 (H) 01/03/2022 02:02 PM    BUN 6 (L) 01/03/2022 02:02 PM    Creatinine 0.61 01/03/2022 02:02 PM    BUN/Creatinine ratio 10 (L) 01/03/2022 02:02 PM    GFR est AA >60 01/03/2022 02:02 PM    GFR est non-AA >60 01/03/2022 02:02 PM    Calcium 8.8 01/03/2022 02:02 PM    Bilirubin, total 0.3 01/03/2022 02:02 PM    Alk. phosphatase 87 01/03/2022 02:02 PM    Protein, total 7.4 01/03/2022 02:02 PM    Albumin 4.0 01/03/2022 02:02 PM    Globulin 3.4 01/03/2022 02:02 PM    A-G Ratio 1.2 01/03/2022 02:02 PM    ALT (SGPT) 31 01/03/2022 02:02 PM    AST (SGOT) 36 01/03/2022 02:02 PM     Lab Results   Component Value Date/Time    Cholesterol, total 162 01/03/2022 02:02 PM    HDL Cholesterol 61 (H) 01/03/2022 02:02 PM    LDL, calculated 86.2 01/03/2022 02:02 PM    VLDL, calculated 14.8 01/03/2022 02:02 PM    Triglyceride 74 01/03/2022 02:02 PM    CHOL/HDL Ratio 2.7 01/03/2022 02:02 PM     Lab Results   Component Value Date/Time    TSH 2.57 01/03/2022 02:02 PM     Lab Results   Component Value Date/Time    Hemoglobin A1c 6.0 (H) 01/03/2022 02:02 PM     Lab Results   Component Value Date/Time    Vitamin D 25-Hydroxy 9.9 (L) 01/03/2022 02:03 PM           ROS: See HPI    Physical Exam  Cardiovascular:      Rate and Rhythm: Normal rate. Pulmonary:      Effort: Pulmonary effort is normal. No respiratory distress. Musculoskeletal:         General: No swelling. Neurological:      General: No focal deficit present. Mental Status: She is alert and oriented to person, place, and time. Psychiatric:         Behavior: Behavior normal.         ASSESSMENT and PLAN    ICD-10-CM ICD-9-CM    1.  Poorly controlled blood pressure: At this time poorly controlled blood pressure. At home patient mentioned that systolic blood pressure in 110s or 120s. Diastolic in 90D. I have advised to bring some blood pressure log. Currently her back spasm has been worse and that is causing her elevation of blood pressure. I have advised her if her home blood pressure goes above 150/90 then start clonidine. I99.8 796.4 EKG, 12 LEAD, INITIAL      REFERRAL TO CARDIOLOGY      cloNIDine HCL (CATAPRES) 0.1 mg tablet   2. ESR raised: Pending referral to rheumatology R70.0 790.1    3. Vitamin D deficiency : Started Drisdol E55.9 268.9    4. Leukopenia, unspecified type: Done hematology referral D72.819 288.50    5. Impaired fasting blood sugar: Diet and lifestyle modification discussed R73.01 790.21    6. Pulmonary hypertension ([facility] [address]): We will send her to cardiology for further evaluation and further management of blood pressure I27.20 416.8    Pt understood and agree with the plan       Follow-up and Dispositions    · Return in about 1 week (around 1/18/2022). Please note that this dictation was completed with [employer], the [employer] voice recognition software. Quite often unanticipated grammatical, syntax, homophones, and other interpretive errors are inadvertently transcribed by the computer software. Please disregard these errors. Please excuse any errors that have escaped final proofreading.

## 2022-01-11 NOTE — PATIENT INSTRUCTIONS
Go to Er if any red flag symptoms like headaches, dizziness. Nausea, vomiting, chest pain or sob. Any weakness in arm or legs. Vision changes. Low Sodium Diet (2,000 Milligram): Care Instructions  Overview     Limiting sodium can be an important part of managing some health problems. The most common source of sodium is salt. People get most of the salt in their diet from canned, prepared, and packaged foods. Fast food and restaurant meals also are very high in sodium. Your doctor will probably limit your sodium to less than 2,000 milligrams (mg) a day. This limit counts all the sodium in prepared and packaged foods and any salt you add to your food. Follow-up care is a key part of your treatment and safety. Be sure to make and go to all appointments, and call your doctor if you are having problems. It's also a good idea to know your test results and keep a list of the medicines you take. How can you care for yourself at home? Read food labels  · Read labels on cans and food packages. The labels tell you how much sodium is in each serving. Make sure that you look at the serving size. If you eat more than the serving size, you have eaten more sodium. · Food labels also tell you the Percent Daily Value for sodium. Choose products with low Percent Daily Values for sodium. · Be aware that sodium can come in forms other than salt, including monosodium glutamate (MSG), sodium citrate, and sodium bicarbonate (baking soda). MSG is often added to Asian food. When you eat out, you can sometimes ask for food without MSG or added salt. Buy low-sodium foods  · Buy foods that are labeled \"unsalted\" (no salt added), \"sodium-free\" (less than 5 mg of sodium per serving), or \"low-sodium\" (140 mg or less of sodium per serving). Foods labeled \"reduced-sodium\" and \"light sodium\" may still have too much sodium. Be sure to read the label to see how much sodium you are getting.   · Buy fresh vegetables, or frozen vegetables without added sauces. Buy low-sodium versions of canned vegetables, soups, and other canned goods. Prepare low-sodium meals  · Cut back on the amount of salt you use in cooking. This will help you adjust to the taste. Do not add salt after cooking. One teaspoon of salt has about 2,300 mg of sodium. · Take the salt shaker off the table. · Flavor your food with garlic, lemon juice, onion, vinegar, herbs, and spices. Do not use soy sauce, lite soy sauce, steak sauce, onion salt, garlic salt, celery salt, or ketchup on your food. · Use low-sodium salad dressings, sauces, and ketchup. Or make your own salad dressings and sauces without adding salt. · Use less salt (or none) when recipes call for it. You can often use half the salt a recipe calls for without losing flavor. Other foods such as rice, pasta, and grains do not need added salt. · Rinse canned vegetables, and cook them in fresh water. This removes somebut not allof the salt. · Avoid water that is naturally high in sodium or that has been treated with water softeners, which add sodium. If you buy bottled water, read the label and choose a sodium-free brand. Avoid high-sodium foods  · Avoid eating:  ? Smoked, cured, salted, and canned meat, fish, and poultry. ? Ham, murry, hot dogs, and luncheon meats. ? Regular, hard, and processed cheese and regular peanut butter. ? Crackers with salted tops, and other salted snack foods such as pretzels, chips, and salted popcorn. ? Frozen prepared meals, unless labeled low-sodium. ? Canned and dried soups, broths, and bouillon, unless labeled sodium-free or low-sodium. ? Canned vegetables, unless labeled sodium-free or low-sodium. ? [employer]te fries, pizza, tacos, and other fast foods. ? Pickles, olives, ketchup, and other condiments, especially soy sauce, unless labeled sodium-free or low-sodium. Where can you learn more?   Go to http://www.gray.com/  Enter V844 in the search box to learn more about \"Low Sodium Diet (2,000 Milligram): Care Instructions. \"  Current as of: December 17, 2020               Content Version: 13.0  © 7761-2424 Healthwise, Incorporated. Care instructions adapted under license by [employer] (which disclaims liability or warranty for this information). If you have questions about a medical condition or this instruction, always ask your healthcare professional. [place] [address] any warranty or liability for your use of this information.

## 2022-01-18 ENCOUNTER — OFFICE VISIT (OUTPATIENT)
Dept: FAMILY MEDICINE CLINIC | Age: 57
End: 2022-01-18
Payer: COMMERCIAL

## 2022-01-18 VITALS
OXYGEN SATURATION: 100 % | BODY MASS INDEX: 22.53 KG/M2 | HEART RATE: 56 BPM | WEIGHT: 132 LBS | DIASTOLIC BLOOD PRESSURE: 82 MMHG | TEMPERATURE: 97.6 F | RESPIRATION RATE: 16 BRPM | SYSTOLIC BLOOD PRESSURE: 138 MMHG | HEIGHT: 64 IN

## 2022-01-18 DIAGNOSIS — R73.01 IMPAIRED FASTING BLOOD SUGAR: ICD-10-CM

## 2022-01-18 DIAGNOSIS — E55.9 VITAMIN D DEFICIENCY: ICD-10-CM

## 2022-01-18 DIAGNOSIS — I99.8 POORLY CONTROLLED BLOOD PRESSURE: ICD-10-CM

## 2022-01-18 DIAGNOSIS — R70.0 ESR RAISED: Primary | ICD-10-CM

## 2022-01-18 DIAGNOSIS — D72.819 LEUKOPENIA, UNSPECIFIED TYPE: ICD-10-CM

## 2022-01-18 PROCEDURE — 99214 OFFICE O/P EST MOD 30 MIN: CPT | Performed by: FAMILY MEDICINE

## 2022-01-18 NOTE — PATIENT INSTRUCTIONS
Rheumatology-   Kevin Matamoros              [address] [address]          Phone:        [phone]        Cardiology- [facility] [address] [id number]  Phone: [phone]    Dr. Jose A Asif- [phone]       Learning About Low-Carbohydrate Diets  What is a low-carbohydrate diet? A low-carbohydrate (or \"low-carb\") diet limits foods and drinks that have carbohydrates. This includes grains, fruits, milk and yogurt, and starchy vegetables like potatoes, beans, and corn. It also avoids foods and drinks that have added sugar. Instead, low-carb diets include foods that are high in protein and fat. Why might you follow a low-carb diet? Low-carb diets may be used for a variety of reasons, such as for weight loss. People who have diabetes may use a low-carb diet to help manage their blood sugar levels. What should you do before you start the diet? Talk to your doctor before you try any diet. This is even more important if you have health problems like kidney disease, heart disease, or diabetes. Your doctor may suggest that you meet with a registered dietitian. A dietitian can help you make an eating plan that works for you. What foods do you eat on a low-carb diet? On a low-carb diet, you choose foods that are high in protein and fat. Examples of these are:  · Meat, poultry, and fish. · Eggs. · Nuts, such as walnuts, pecans, almonds, and peanuts. · Peanut butter and other nut butters. · Tofu. · Avocado. · [place]. · Non-starchy vegetables like broccoli, cauliflower, green beans, mushrooms, peppers, lettuce, and spinach. · Unsweetened non-dairy milks like almond milk and coconut milk. · Cheese, cottage cheese, and cream cheese. Current as of: December 17, 2020               Content Version: 13.0  © 4575-8747 Healthwise, Incorporated.    Care instructions adapted under license by [employer] (which disclaims liability or warranty for this information). If you have questions about a medical condition or this instruction, always ask your healthcare professional. [address] any warranty or liability for your use of this information. Low Sodium Diet (2,000 Milligram): Care Instructions  Overview     Limiting sodium can be an important part of managing some health problems. The most common source of sodium is salt. People get most of the salt in their diet from canned, prepared, and packaged foods. Fast food and restaurant meals also are very high in sodium. Your doctor will probably limit your sodium to less than 2,000 milligrams (mg) a day. This limit counts all the sodium in prepared and packaged foods and any salt you add to your food. Follow-up care is a key part of your treatment and safety. Be sure to make and go to all appointments, and call your doctor if you are having problems. It's also a good idea to know your test results and keep a list of the medicines you take. How can you care for yourself at home? Read food labels  · Read labels on cans and food packages. The labels tell you how much sodium is in each serving. Make sure that you look at the serving size. If you eat more than the serving size, you have eaten more sodium. · Food labels also tell you the Percent Daily Value for sodium. Choose products with low Percent Daily Values for sodium. · Be aware that sodium can come in forms other than salt, including monosodium glutamate (MSG), sodium citrate, and sodium bicarbonate (baking soda). MSG is often added to Asian food. When you eat out, you can sometimes ask for food without MSG or added salt. Buy low-sodium foods  · Buy foods that are labeled \"unsalted\" (no salt added), \"sodium-free\" (less than 5 mg of sodium per serving), or \"low-sodium\" (140 mg or less of sodium per serving). Foods labeled \"reduced-sodium\" and \"light sodium\" may still have too much sodium.  Be sure to read the label to see how much sodium you are getting. · Buy fresh vegetables, or frozen vegetables without added sauces. Buy low-sodium versions of canned vegetables, soups, and other canned goods. Prepare low-sodium meals  · Cut back on the amount of salt you use in cooking. This will help you adjust to the taste. Do not add salt after cooking. One teaspoon of salt has about 2,300 mg of sodium. · Take the salt shaker off the table. · Flavor your food with garlic, lemon juice, onion, vinegar, herbs, and spices. Do not use soy sauce, lite soy sauce, steak sauce, onion salt, garlic salt, celery salt, or ketchup on your food. · Use low-sodium salad dressings, sauces, and ketchup. Or make your own salad dressings and sauces without adding salt. · Use less salt (or none) when recipes call for it. You can often use half the salt a recipe calls for without losing flavor. Other foods such as rice, pasta, and grains do not need added salt. · Rinse canned vegetables, and cook them in fresh water. This removes somebut not allof the salt. · Avoid water that is naturally high in sodium or that has been treated with water softeners, which add sodium. If you buy bottled water, read the label and choose a sodium-free brand. Avoid high-sodium foods  · Avoid eating:  ? Smoked, cured, salted, and canned meat, fish, and poultry. ? Ham, murry, hot dogs, and luncheon meats. ? Regular, hard, and processed cheese and regular peanut butter. ? Crackers with salted tops, and other salted snack foods such as pretzels, chips, and salted popcorn. ? Frozen prepared meals, unless labeled low-sodium. ? Canned and dried soups, broths, and bouillon, unless labeled sodium-free or low-sodium. ? Canned vegetables, unless labeled sodium-free or low-sodium. ? [employer] fries, pizza, tacos, and other fast foods. ? Pickles, olives, ketchup, and other condiments, especially soy sauce, unless labeled sodium-free or low-sodium. Where can you learn more?   Go to http://www.gray.com/  Enter W585 in the search box to learn more about \"Low Sodium Diet (2,000 Milligram): Care Instructions. \"  Current as of: December 17, 2020               Content Version: 13.0  © 7426-9206 Healthwise, Incorporated. Care instructions adapted under license by [employer] (which disclaims liability or warranty for this information). If you have questions about a medical condition or this instruction, always ask your healthcare professional. [address] any warranty or liability for your use of this information.

## 2022-01-18 NOTE — PROGRESS NOTES
Chief Complaint   Patient presents with    Hypertension     EKG not done- order re-printed     Other     Pre-DM    Other     Pulmonary HTN     1. \"Have you been to the ER, urgent care clinic since your last visit? Hospitalized since your last visit? \" No    2. \"Have you seen or consulted any other health care providers outside of the [address] [place] [address] since your last visit? \" No     3. For patients aged 39-70: Has the patient had a colonoscopy / FIT/ Cologuard? No      If the patient is female:    4. For patients aged 41-77: Has the patient had a mammogram within the past 2 years? Yes - no Care Gap present  See top three    5. For patients aged 21-65: Has the patient had a pap smear?  No

## 2022-01-18 NOTE — PROGRESS NOTES
HISTORY OF PRESENT ILLNESS  Kim Yusuf Parent is a 64 y.o. female. HPI: Here for follow-up and lab result discussion. Last visit had an elevated and poorly controlled blood pressure. She has stopped taking her blood pressure medicine since long time. Last visit restarted medication along with adding hydralazine. She has been feeling better and blood pressure has improved. During visit sitting comfortable without any acute distress. Denies any headache, dizziness, no chest pain or trouble breathing, no arm or leg weakness. No nausea or vomiting, no weight or appetite changes, no mood changes . No urine or bowel complains, no palpitation, no diaphoresis. No abdominal pain. No cold or cough. No leg swelling. No fever. No sleep trouble. Also discussed lab results. Prediabetes. Discussed importance of lifestyle and diet modification. Low vitamin D. She has started taking supplement. Raised ESR. She has not made an appointment with rheumatologist yet. Given contact information to make an appointment. Has not done EKG yet. Also pending appointment with cardiology. Leukopenia. Been following hematology. Has pending follow-up. No cold cough or wheezing. No fever. Visit Vitals  /82 (BP 1 Location: Left arm, BP Patient Position: Sitting, BP Cuff Size: Adult)   Pulse (!) 56   Temp 97.6 °F (36.4 °C) (Temporal)   Resp 16   Ht 5' 4\" (1.626 m)   Wt 132 lb (59.9 kg)   SpO2 100%   BMI 22.66 kg/m²     Review medication list, vitals, problem list,allergies.    Lab Results   Component Value Date/Time    WBC 1.5 (L) 01/03/2022 02:02 PM    HGB 11.1 (L) 01/03/2022 02:02 PM    HCT 36.8 01/03/2022 02:02 PM    PLATELET 124 93/68/8489 02:02 PM    MCV 81.8 01/03/2022 02:02 PM     Lab Results   Component Value Date/Time    Sodium 140 01/03/2022 02:02 PM    Potassium 3.1 (L) 01/03/2022 02:02 PM    Chloride 105 01/03/2022 02:02 PM    CO2 29 01/03/2022 02:02 PM    Anion gap 6 01/03/2022 02:02 PM    Glucose 102 (H) 01/03/2022 02:02 PM    BUN 6 (L) 01/03/2022 02:02 PM    Creatinine 0.61 01/03/2022 02:02 PM    BUN/Creatinine ratio 10 (L) 01/03/2022 02:02 PM    GFR est AA >60 01/03/2022 02:02 PM    GFR est non-AA >60 01/03/2022 02:02 PM    Calcium 8.8 01/03/2022 02:02 PM    Bilirubin, total 0.3 01/03/2022 02:02 PM    Alk. phosphatase 87 01/03/2022 02:02 PM    Protein, total 7.4 01/03/2022 02:02 PM    Albumin 4.0 01/03/2022 02:02 PM    Globulin 3.4 01/03/2022 02:02 PM    A-G Ratio 1.2 01/03/2022 02:02 PM    ALT (SGPT) 31 01/03/2022 02:02 PM    AST (SGOT) 36 01/03/2022 02:02 PM     Lab Results   Component Value Date/Time    Cholesterol, total 162 01/03/2022 02:02 PM    HDL Cholesterol 61 (H) 01/03/2022 02:02 PM    LDL, calculated 86.2 01/03/2022 02:02 PM    VLDL, calculated 14.8 01/03/2022 02:02 PM    Triglyceride 74 01/03/2022 02:02 PM    CHOL/HDL Ratio 2.7 01/03/2022 02:02 PM     Lab Results   Component Value Date/Time    TSH 2.57 01/03/2022 02:02 PM     Lab Results   Component Value Date/Time    Hemoglobin A1c 6.0 (H) 01/03/2022 02:02 PM     Lab Results   Component Value Date/Time    Vitamin D 25-Hydroxy 9.9 (L) 01/03/2022 02:03 PM             ROS: See HPI    Physical Exam  Constitutional:       General: She is not in acute distress. Cardiovascular:      Rate and Rhythm: Normal rate and regular rhythm. Heart sounds: Normal heart sounds. Abdominal:      General: Bowel sounds are normal.      Palpations: Abdomen is soft. Tenderness: There is no abdominal tenderness. Musculoskeletal:         General: No swelling. Neurological:      Mental Status: She is oriented to person, place, and time. Psychiatric:         Behavior: Behavior normal.         ASSESSMENT and PLAN    ICD-10-CM ICD-9-CM    1. ESR raised: Pending referral to rheumatology. Provided their information to schedule an appointment R70.0 790.1    2. Poorly controlled blood pressure: Restarted medication and adjusted medication dose.   Today blood pressure been stable. Also pending EKG and cardiology referral I99.8 796.4    3. Vitamin D deficiency: Started supplement E55.9 268.9    4. Impaired fasting blood sugar: Prediabetes. Discussed lifestyle and diet modification. R73.01 790.21    5. Leukopenia, unspecified type: Pending appointment with hematology for further evaluation D72.819 288.50    Patient understood and agreed with the plan  Had done Pap smear with OB/GYN but now will schedule an appointment with us    Follow-up and Dispositions    · Return in about 3 months (around 4/18/2022) for need an appt for physical with pap . Please note that this dictation was completed with [employer], the computer voice recognition software. Quite often unanticipated grammatical, syntax, homophones, and other interpretive errors are inadvertently transcribed by the computer software. Please disregard these errors. Please excuse any errors that have escaped final proofreading.

## 2022-01-19 NOTE — PROGRESS NOTES
Contacted patient and verified identity using name and date of birth (2- identifiers) A1C in prediabetic range. Very important to do life style modification, diet modification and exercise as tolerated. Aware that having pains and aches . Also elevated ESR and with her joint pains I am sending her to rheumatology. Very low vitamin D. Sending drisdol and recheck in 3 months. Once done with drisdol daily 5000 units of vitamin d 3 otc. Thyroid function wnl at this time as it was elevated in the past. Very low wbc and anemia. Sending to hematology and also advise her to make an appt with GI to get colonoscopy scheduled as well.  Keep follow up to discuss this lab result further

## 2022-01-26 ENCOUNTER — TELEPHONE (OUTPATIENT)
Age: 57
End: 2022-01-26

## 2022-03-18 PROBLEM — R70.0 ESR RAISED: Status: ACTIVE | Noted: 2022-01-04

## 2022-03-18 PROBLEM — D50.0 IRON DEFICIENCY ANEMIA DUE TO CHRONIC BLOOD LOSS: Status: ACTIVE | Noted: 2018-11-05

## 2022-03-19 PROBLEM — M47.817 LUMBOSACRAL SPONDYLOSIS WITHOUT MYELOPATHY: Status: ACTIVE | Noted: 2018-08-09

## 2022-03-19 PROBLEM — E55.9 VITAMIN D DEFICIENCY: Status: ACTIVE | Noted: 2022-01-04

## 2022-03-19 PROBLEM — J04.0 LARYNGITIS: Status: ACTIVE | Noted: 2019-11-08

## 2022-03-19 PROBLEM — M54.16 LUMBAR NEURITIS: Status: ACTIVE | Noted: 2018-08-09

## 2022-03-19 PROBLEM — R79.89 ABNORMAL CBC: Status: ACTIVE | Noted: 2022-01-04

## 2022-03-19 PROBLEM — F41.9 ANXIETY: Status: ACTIVE | Noted: 2018-04-18

## 2022-03-19 PROBLEM — I10 ESSENTIAL HYPERTENSION WITH GOAL BLOOD PRESSURE LESS THAN 140/90: Status: ACTIVE | Noted: 2018-04-18

## 2022-03-19 PROBLEM — R68.89 FLU-LIKE SYMPTOMS: Status: ACTIVE | Noted: 2020-02-19

## 2022-03-19 PROBLEM — R11.10 NON-INTRACTABLE VOMITING: Status: ACTIVE | Noted: 2020-02-19

## 2022-03-19 PROBLEM — M25.50 PAIN IN JOINT INVOLVING MULTIPLE SITES: Status: ACTIVE | Noted: 2022-01-04

## 2022-03-19 PROBLEM — K52.9 GASTROENTERITIS: Status: ACTIVE | Noted: 2020-02-19

## 2022-03-19 PROBLEM — M41.9 SCOLIOSIS OF LUMBAR SPINE: Status: ACTIVE | Noted: 2018-08-09

## 2022-03-20 PROBLEM — M51.36 DDD (DEGENERATIVE DISC DISEASE), LUMBAR: Status: ACTIVE | Noted: 2018-08-09

## 2022-03-20 PROBLEM — M51.369 DDD (DEGENERATIVE DISC DISEASE), LUMBAR: Status: ACTIVE | Noted: 2018-08-09

## 2022-05-24 ENCOUNTER — APPOINTMENT (OUTPATIENT)
Dept: INFUSION THERAPY | Age: 57
End: 2022-05-24

## 2022-08-24 DIAGNOSIS — M79.642 PAIN OF LEFT HAND: ICD-10-CM

## 2022-08-24 DIAGNOSIS — I10 ESSENTIAL HYPERTENSION: ICD-10-CM

## 2022-08-24 DIAGNOSIS — M51.36 DDD (DEGENERATIVE DISC DISEASE), LUMBAR: ICD-10-CM

## 2022-08-24 DIAGNOSIS — M79.641 PAIN OF RIGHT HAND: ICD-10-CM

## 2022-08-24 NOTE — TELEPHONE ENCOUNTER
This patient contacted office for the following prescriptions to be filled:    Last office visit: 1/18/2022  Follow up appointment: 9/27/2022  Medication requested :   Requested Prescriptions     Pending Prescriptions Disp Refills    lisinopriL (PRINIVIL, ZESTRIL) 40 mg tablet 90 Tablet 0     Sig: Take 1 Tablet by mouth daily. naproxen (NAPROSYN) 500 mg tablet 60 Tablet 1     Sig: Take 1 Tablet by mouth two (2) times daily (with meals).     amLODIPine (NORVASC) 10 mg tablet 90 Tablet 0     PCP: Yoseph Neil order or Local pharmacy name [address] [phone]

## 2022-08-25 RX ORDER — NAPROXEN 500 MG/1
500 TABLET ORAL 2 TIMES DAILY WITH MEALS
Qty: 60 TABLET | Refills: 1 | Status: SHIPPED | OUTPATIENT
Start: 2022-08-25 | End: 2022-09-27 | Stop reason: SDUPTHER

## 2022-08-25 RX ORDER — AMLODIPINE BESYLATE 10 MG/1
10 TABLET ORAL DAILY
Qty: 90 TABLET | Refills: 0 | Status: SHIPPED | OUTPATIENT
Start: 2022-08-25

## 2022-08-25 RX ORDER — LISINOPRIL 40 MG/1
40 TABLET ORAL DAILY
Qty: 90 TABLET | Refills: 0 | Status: SHIPPED | OUTPATIENT
Start: 2022-08-25

## 2022-09-27 ENCOUNTER — OFFICE VISIT (OUTPATIENT)
Dept: FAMILY MEDICINE CLINIC | Age: 57
End: 2022-09-27
Payer: COMMERCIAL

## 2022-09-27 ENCOUNTER — HOSPITAL ENCOUNTER (OUTPATIENT)
Dept: LAB | Age: 57
Discharge: HOME | End: 2022-09-27
Payer: COMMERCIAL

## 2022-09-27 VITALS
DIASTOLIC BLOOD PRESSURE: 76 MMHG | SYSTOLIC BLOOD PRESSURE: 136 MMHG | RESPIRATION RATE: 18 BRPM | WEIGHT: 143 LBS | BODY MASS INDEX: 24.41 KG/M2 | TEMPERATURE: 98.2 F | OXYGEN SATURATION: 98 % | HEIGHT: 64 IN | HEART RATE: 93 BPM

## 2022-09-27 DIAGNOSIS — Z13.220 SCREENING FOR HYPERLIPIDEMIA: ICD-10-CM

## 2022-09-27 DIAGNOSIS — M54.50 CHRONIC BILATERAL LOW BACK PAIN WITHOUT SCIATICA: ICD-10-CM

## 2022-09-27 DIAGNOSIS — Z13.1 SCREENING FOR DIABETES MELLITUS: ICD-10-CM

## 2022-09-27 DIAGNOSIS — M51.36 DDD (DEGENERATIVE DISC DISEASE), LUMBAR: ICD-10-CM

## 2022-09-27 DIAGNOSIS — R53.82 CHRONIC FATIGUE: ICD-10-CM

## 2022-09-27 DIAGNOSIS — M54.2 NECK PAIN: ICD-10-CM

## 2022-09-27 DIAGNOSIS — M62.838 MUSCLE SPASM: Primary | ICD-10-CM

## 2022-09-27 DIAGNOSIS — I10 ESSENTIAL HYPERTENSION WITH GOAL BLOOD PRESSURE LESS THAN 140/90: ICD-10-CM

## 2022-09-27 DIAGNOSIS — E55.9 VITAMIN D DEFICIENCY: ICD-10-CM

## 2022-09-27 DIAGNOSIS — M79.642 PAIN OF LEFT HAND: ICD-10-CM

## 2022-09-27 DIAGNOSIS — R70.0 ESR RAISED: ICD-10-CM

## 2022-09-27 DIAGNOSIS — Z12.11 SCREENING FOR COLON CANCER: ICD-10-CM

## 2022-09-27 DIAGNOSIS — D72.819 LEUKOPENIA, UNSPECIFIED TYPE: ICD-10-CM

## 2022-09-27 DIAGNOSIS — E61.1 IRON DEFICIENCY: ICD-10-CM

## 2022-09-27 DIAGNOSIS — M79.641 PAIN OF RIGHT HAND: ICD-10-CM

## 2022-09-27 DIAGNOSIS — G89.29 CHRONIC BILATERAL LOW BACK PAIN WITHOUT SCIATICA: ICD-10-CM

## 2022-09-27 LAB
25(OH)D3 SERPL-MCNC: 11.9 NG/ML (ref 30–100)
ALBUMIN SERPL-MCNC: 3.9 G/DL (ref 3.4–5)
ALBUMIN/GLOB SERPL: 1.1 {RATIO} (ref 0.8–1.7)
ALP SERPL-CCNC: 103 U/L (ref 45–117)
ALT SERPL-CCNC: 19 U/L (ref 13–56)
ANION GAP SERPL CALC-SCNC: 4 MMOL/L (ref 3–18)
AST SERPL-CCNC: 15 U/L (ref 10–38)
BILIRUB SERPL-MCNC: 0.2 MG/DL (ref 0.2–1)
BUN SERPL-MCNC: 16 MG/DL (ref 7–18)
BUN/CREAT SERPL: 24 (ref 12–20)
CALCIUM SERPL-MCNC: 9.1 MG/DL (ref 8.5–10.1)
CHLORIDE SERPL-SCNC: 105 MMOL/L (ref 100–111)
CHOLEST SERPL-MCNC: 170 MG/DL
CO2 SERPL-SCNC: 30 MMOL/L (ref 21–32)
CREAT SERPL-MCNC: 0.67 MG/DL (ref 0.6–1.3)
ERYTHROCYTE [DISTWIDTH] IN BLOOD BY AUTOMATED COUNT: 14.4 % (ref 11.6–14.5)
ERYTHROCYTE [SEDIMENTATION RATE] IN BLOOD: 24 MM/HR (ref 0–30)
EST. AVERAGE GLUCOSE BLD GHB EST-MCNC: 123 MG/DL
GLOBULIN SER CALC-MCNC: 3.5 G/DL (ref 2–4)
GLUCOSE SERPL-MCNC: 93 MG/DL (ref 74–99)
HBA1C MFR BLD: 5.9 % (ref 4.2–5.6)
HCT VFR BLD AUTO: 34.7 % (ref 35–45)
HDLC SERPL-MCNC: 82 MG/DL (ref 40–60)
HDLC SERPL: 2.1 {RATIO} (ref 0–5)
HGB BLD-MCNC: 10.5 G/DL (ref 12–16)
IRON SATN MFR SERPL: 7 % (ref 20–50)
IRON SERPL-MCNC: 32 UG/DL (ref 50–175)
LDLC SERPL CALC-MCNC: 74.2 MG/DL (ref 0–100)
LIPID PROFILE,FLP: ABNORMAL
MCH RBC QN AUTO: 24.8 PG (ref 24–34)
MCHC RBC AUTO-ENTMCNC: 30.3 G/DL (ref 31–37)
MCV RBC AUTO: 82 FL (ref 78–100)
NRBC # BLD: 0 K/UL (ref 0–0.01)
NRBC BLD-RTO: 0 PER 100 WBC
PLATELET # BLD AUTO: 274 K/UL (ref 135–420)
PMV BLD AUTO: 11.8 FL (ref 9.2–11.8)
POTASSIUM SERPL-SCNC: 3.7 MMOL/L (ref 3.5–5.5)
PROT SERPL-MCNC: 7.4 G/DL (ref 6.4–8.2)
RBC # BLD AUTO: 4.23 M/UL (ref 4.2–5.3)
SODIUM SERPL-SCNC: 139 MMOL/L (ref 136–145)
TIBC SERPL-MCNC: 448 UG/DL (ref 250–450)
TRIGL SERPL-MCNC: 69 MG/DL (ref ?–150)
TSH SERPL DL<=0.05 MIU/L-ACNC: 2.76 UIU/ML (ref 0.36–3.74)
VLDLC SERPL CALC-MCNC: 13.8 MG/DL
WBC # BLD AUTO: 3.8 K/UL (ref 4.6–13.2)

## 2022-09-27 PROCEDURE — 36415 COLL VENOUS BLD VENIPUNCTURE: CPT

## 2022-09-27 PROCEDURE — 99214 OFFICE O/P EST MOD 30 MIN: CPT | Performed by: FAMILY MEDICINE

## 2022-09-27 PROCEDURE — 84443 ASSAY THYROID STIM HORMONE: CPT

## 2022-09-27 PROCEDURE — 82306 VITAMIN D 25 HYDROXY: CPT

## 2022-09-27 PROCEDURE — 83036 HEMOGLOBIN GLYCOSYLATED A1C: CPT

## 2022-09-27 PROCEDURE — 85027 COMPLETE CBC AUTOMATED: CPT

## 2022-09-27 PROCEDURE — 80061 LIPID PANEL: CPT

## 2022-09-27 PROCEDURE — 83540 ASSAY OF IRON: CPT

## 2022-09-27 PROCEDURE — 80053 COMPREHEN METABOLIC PANEL: CPT

## 2022-09-27 PROCEDURE — 85652 RBC SED RATE AUTOMATED: CPT

## 2022-09-27 RX ORDER — CYCLOBENZAPRINE HCL 10 MG
10 TABLET ORAL
Qty: 30 TABLET | Refills: 1 | Status: SHIPPED | OUTPATIENT
Start: 2022-09-27

## 2022-09-27 RX ORDER — NAPROXEN 500 MG/1
500 TABLET ORAL 2 TIMES DAILY WITH MEALS
Qty: 60 TABLET | Refills: 1 | Status: SHIPPED | OUTPATIENT
Start: 2022-09-27

## 2022-09-27 NOTE — PROGRESS NOTES
Chief Complaint   Patient presents with    Abnormal White Blood Cell Count     Hx of leukopenias     Hypertension    Vitamin D Deficiency    Other     Hx  of elevated ESR IFG    Neck Pain     C/o tightness on left side of neck           1. \"Have you been to the ER, urgent care clinic since your last visit? Hospitalized since your last visit? \" No    2. \"Have you seen or consulted any other health care providers outside of the [address] [place] [address] since your last visit? \" No     3. For patients aged 39-70: Has the patient had a colonoscopy / FIT/ Cologuard? Yes - Care Gap present. Most recent result on file FIT Test 2016      If the patient is female:    4. For patients aged 41-77: Has the patient had a mammogram within the past 2 years? Yes - Care Gap present. Most recent result on file 2021      5. For patients aged 21-65: Has the patient had a pap smear? Yes - Care Gap present.  Most recent result on file 2017

## 2022-09-28 DIAGNOSIS — E61.1 IRON DEFICIENCY: Primary | ICD-10-CM

## 2022-09-28 DIAGNOSIS — E55.9 VITAMIN D DEFICIENCY: ICD-10-CM

## 2022-09-28 RX ORDER — FERROUS SULFATE 325(65) MG
325 TABLET, DELAYED RELEASE (ENTERIC COATED) ORAL
Qty: 30 TABLET | Refills: 0 | Status: SHIPPED | OUTPATIENT
Start: 2022-09-28

## 2022-09-28 RX ORDER — ERGOCALCIFEROL 1.25 MG/1
50000 CAPSULE ORAL
Qty: 12 CAPSULE | Refills: 0 | Status: SHIPPED | OUTPATIENT
Start: 2022-09-28

## 2022-09-28 NOTE — PROGRESS NOTES
Anemia, low iron. Starting ferrous sulfate and done a referral with hematology. low vitamin D. Sending Drisdol. Recheck labs in 3 months. ESR within normal limit at this time. A1c in prediabetic range. Lipid panel stable. Work on lifestyle and diet modification.   Further discussion on follow-up visit

## 2022-09-29 NOTE — PROGRESS NOTES
HISTORY OF PRESENT ILLNESS  Kim Guardado is a 62 y.o. female. HPI: Here for follow-up. Complaining of multiple side joint pain. Muscle spasm and neck pain. Back pain. Bilateral hand pain. Taking naproxen as needed. Agreed to see the spine center. In the past raised ESR. Hypertension. Today vitals been stable. Taking medication with compliance. Average pain is moderate in intensity. Sometimes affecting her routine life. Today did not appear in any acute distress. Prediabetes. Again discussed importance of diet and lifestyle modification. Recheck vitamin D. On and off fatigue. Noted low WBC count. Has seen hematology in the past.  Now resending to the specialist.  Denies any headache, dizziness, no chest pain or trouble breathing, no arm or leg weakness. No nausea or vomiting, no weight or appetite changes, no mood changes . No urine or bowel complains, no palpitation, no diaphoresis. No abdominal pain. No cold or cough. No leg swelling. No fever. No sleep trouble. Visit Vitals  /76 (BP 1 Location: Right arm, BP Patient Position: Sitting, BP Cuff Size: Large adult)   Pulse 93   Temp 98.2 °F (36.8 °C) (Temporal)   Resp 18   Ht 5' 4\" (1.626 m)   Wt 143 lb (64.9 kg)   SpO2 98%   BMI 24.55 kg/m²     Review medication list, vitals, problem list,allergies.    Lab Results   Component Value Date/Time    WBC 3.8 (L) 09/27/2022 02:23 PM    HGB 10.5 (L) 09/27/2022 02:23 PM    HCT 34.7 (L) 09/27/2022 02:23 PM    PLATELET 799 56/10/0528 02:23 PM    MCV 82.0 09/27/2022 02:23 PM     Lab Results   Component Value Date/Time    Sodium 139 09/27/2022 02:23 PM    Potassium 3.7 09/27/2022 02:23 PM    Chloride 105 09/27/2022 02:23 PM    CO2 30 09/27/2022 02:23 PM    Anion gap 4 09/27/2022 02:23 PM    Glucose 93 09/27/2022 02:23 PM    BUN 16 09/27/2022 02:23 PM    Creatinine 0.67 09/27/2022 02:23 PM    BUN/Creatinine ratio 24 (H) 09/27/2022 02:23 PM    GFR est AA >60 09/27/2022 02:23 PM    GFR est non-AA >60 09/27/2022 02:23 PM    Calcium 9.1 09/27/2022 02:23 PM    Bilirubin, total 0.2 09/27/2022 02:23 PM    Alk. phosphatase 103 09/27/2022 02:23 PM    Protein, total 7.4 09/27/2022 02:23 PM    Albumin 3.9 09/27/2022 02:23 PM    Globulin 3.5 09/27/2022 02:23 PM    A-G Ratio 1.1 09/27/2022 02:23 PM    ALT (SGPT) 19 09/27/2022 02:23 PM    AST (SGOT) 15 09/27/2022 02:23 PM     Lab Results   Component Value Date/Time    Cholesterol, total 170 09/27/2022 02:23 PM    HDL Cholesterol 82 (H) 09/27/2022 02:23 PM    LDL, calculated 74.2 09/27/2022 02:23 PM    VLDL, calculated 13.8 09/27/2022 02:23 PM    Triglyceride 69 09/27/2022 02:23 PM    CHOL/HDL Ratio 2.1 09/27/2022 02:23 PM     Lab Results   Component Value Date/Time    TSH 2.76 09/27/2022 02:23 PM     Lab Results   Component Value Date/Time    Hemoglobin A1c 5.9 (H) 09/27/2022 02:23 PM     Lab Results   Component Value Date/Time    Vitamin D 25-Hydroxy 11.9 (L) 09/27/2022 02:23 PM           ROS: See HPI    Physical Exam  Constitutional:       General: She is not in acute distress. Cardiovascular:      Rate and Rhythm: Normal rate and regular rhythm. Heart sounds: Normal heart sounds. Abdominal:      General: Bowel sounds are normal.      Palpations: Abdomen is soft. Tenderness: There is no abdominal tenderness. Musculoskeletal:         General: No swelling. Comments: Multiple side joint tenderness but no swelling or redness of the joints   Neurological:      Mental Status: She is oriented to person, place, and time. Psychiatric:         Behavior: Behavior normal.       ASSESSMENT and PLAN    ICD-10-CM ICD-9-CM    1. Muscle spasm: Given symptomatic treatment. Sending to spine center. Repeat ESR. Symptomatic treatment with a heating pad and NSAIDs. Discussed to avoid driving after taking muscle relaxant. Discussed alternative treatment like Cymbalta or Elavil.   Will follow specialist recommendation M62.838 728.85 REFERRAL TO SPINE SURGERY CANCELED: REFERRAL TO SPINE SURGERY      2. Pain of left hand  M79.642 729.5 naproxen (NAPROSYN) 500 mg tablet      3. Pain of right hand  M79.641 729.5 naproxen (NAPROSYN) 500 mg tablet      4. DDD (degenerative disc disease), lumbar  M51.36 722.52 naproxen (NAPROSYN) 500 mg tablet      REFERRAL TO SPINE SURGERY      CANCELED: REFERRAL TO SPINE SURGERY      5. Chronic bilateral low back pain without sciatica  M54.50 724.2 cyclobenzaprine (FLEXERIL) 10 mg tablet    G89.29 338.29 REFERRAL TO SPINE SURGERY      CANCELED: REFERRAL TO SPINE SURGERY      6. Essential hypertension with goal blood pressure less than 140/90:well controlled. Continue current dose of medication and low salt diet. Exercise as tolerated. I10 401.9       7. Leukopenia, unspecified type: No frequent cold cough or fever. Will resend her to hematology has noted anemia as well D72.819 288.50 REFERRAL TO HEMATOLOGY ONCOLOGY    not seen hematology yet. will send to hematology again. pending result from today       8. Vitamin D deficiency: Recently started vitamin D as recent labs showed low vitamin D E55.9 268.9       9. Screening for colon cancer  Z12.11 V76.51 REFERRAL TO COLON AND RECTAL SURGERY      10. ESR raised: adding labs to repeat ESR.  R70.0 790.1 REFERRAL TO SPINE SURGERY      11. Neck pain  M54.2 723.1 REFERRAL TO SPINE SURGERY      CANCELED: REFERRAL TO SPINE SURGERY      Patient understood agreed with the plan  Will contact Dr. Matt Tavares office for colonoscopy consultation  Follow-up and Dispositions    Return in about 3 months (around 12/27/2022) for need an appt for physical with pap . Please note that this dictation was completed with [employer], the [employer] voice recognition software. Quite often unanticipated grammatical, syntax, homophones, and other interpretive errors are inadvertently transcribed by the computer software. Please disregard these errors. Please excuse any errors that have escaped final proofreading.

## 2022-10-22 NOTE — PROGRESS NOTES
Spoke with patient (two identifiers verified) to advise results showed Anemia, low iron. Starting ferrous sulfate and done a referral with hematology. low vitamin D. Sending Drisdol Rx to pharmacy. Patient verbalized understanding. She is aware both prescriptions were sent to [address] [place] [address] on [facility]. Dr. Candido Orozco will recheck labs in 3 months. ESR within normal limit at this time. A1c is in prediabetic range. Lipid panel stable. Work on lifestyle and diet modification. Further discussion on follow-up visit. Patient verbalized understanding.

## 2022-11-02 NOTE — PROGRESS NOTES
Hematology/Oncology  Consult   Note    Name: Aileen Esters  Date: 11/3/2022  : 1965  Primary Care Provider: Preet Sood MD    Ms. Thor Peoples is a 62y.o. year old female with leukopenia and DAMIAN. No increase in infections. Started oral iron once daily and tolerating so far. Has also started vit D    Patient is postmenopausal    Colonoscopy is planned by PCP and if negative I suggest EGD    Patient works for [employer] at [employer]    Current Therapy: diagnostic evaluation    Subjective: The past medical, surgical and social history has been reviewed and remains unchanged.    Past Medical History:   Diagnosis Date    Abnormal mammogram 3/2016    BIRADS 3 right breast    Hypertension     Menopause     Stroke ([facility] [address] 2016    CVA     Past Surgical History:   Procedure Laterality Date    HX GYN            Social History     Socioeconomic History    Marital status:      Spouse name: Not on file    Number of children: Not on file    Years of education: Not on file    Highest education level: Not on file   Occupational History    Not on file   Tobacco Use    Smoking status: Former     Types: Cigarettes     Quit date: 1999     Years since quittin.9    Smokeless tobacco: Never   Substance and Sexual Activity    Alcohol use: No    Drug use: No    Sexual activity: Yes     Partners: Male     Birth control/protection: Condom   Other Topics Concern    Not on file   Social History Narrative    Not on file     Social Determinants of Health     Financial Resource Strain: Not on file   Food Insecurity: Not on file   Transportation Needs: Not on file   Physical Activity: Not on file   Stress: Not on file   Social Connections: Not on file   Intimate Partner Violence: Not on file   Housing Stability: Not on file     Family History   Problem Relation Age of Onset    No Known Problems Mother     No Known Problems Father     No Known Problems Sister     No Known Problems Brother     No Known Problems Maternal Aunt     No Known Problems Maternal Uncle     No Known Problems Paternal Aunt     No Known Problems Paternal Uncle     No Known Problems Maternal Grandmother     No Known Problems Paternal Grandmother     No Known Problems Paternal Grandfather     No Known Problems Other      Current Outpatient Medications   Medication Sig Dispense Refill    ferrous sulfate (IRON) 325 mg (65 mg iron) EC tablet Take 1 Tablet by mouth three (3) times daily (with meals). 30 Tablet 0    ergocalciferol (DrisdoL) 1,250 mcg (50,000 unit) capsule Take 1 Capsule by mouth every seven (7) days. 12 Capsule 0    naproxen (NAPROSYN) 500 mg tablet Take 1 Tablet by mouth two (2) times daily (with meals). 60 Tablet 1    cyclobenzaprine (FLEXERIL) 10 mg tablet Take 1 Tablet by mouth nightly as needed for Muscle Spasm(s). 30 Tablet 1    lisinopriL (PRINIVIL, ZESTRIL) 40 mg tablet Take 1 Tablet by mouth daily. 90 Tablet 0    amLODIPine (NORVASC) 10 mg tablet Take 1 Tablet by mouth daily. 90 Tablet 0    cloNIDine HCL (CATAPRES) 0.1 mg tablet Take 1 Tablet by mouth two (2) times a day. 60 Tablet 1    metoprolol tartrate (LOPRESSOR) 100 mg IR tablet Take 1 tablet by mouth twice daily 180 Tablet 0    chlorthalidone (HYGROTON) 25 mg tablet Take 1 Tablet by mouth daily. 90 Tablet 1    hydrALAZINE (APRESOLINE) 100 mg tablet Take 1 Tablet by mouth three (3) times daily. 180 Tablet 1       Review of Systems:    General :The patient has no complaints and there is no physical distress evident. except for back pain. Follows with PCP     Psychological : patient denies having any psychological symptoms such as hallucinations depression or anxiety. Ophthalmic:the patient denies having any visual impairment or eye discomfort. ENT: there are no abnormalities reported. Allergy and Immunology:the patient denies having any seasonal allergies or allergies to medications other than those already outlined above. Hematological and Lymphatic: the patient denies having any bruising, bleeding or lymphadenopathy. Endocrine: the patient denies having any heat or cold intolerance. There is no history of diabetes or thyroid disorders. Breast: the patient denies having any history of breast mass or lumps. Respiratory:the patient denies having any cough, shortness of breath, or dyspnea on exertion. Cardiovascular: there are no complaints of chest pain, palpitations, chest pounding, or dyspnea on exertion. Gastrointestinal: the patient denies having nausea, emesis, diarrhea, constipation, or blood in the stool. Genito-Urinary: the patient denies having urinary urgency, frequency, or dysuria. Musculoskeletal: with the exception of mild arthralgias the patient has no other musculoskeletal complaints. Neurological:  denies having any numbness, tingling, or neurologic deficits. Dermatological: patient denies having any unexplained rash, skin ulcerations, or hives. Objective:     Visit Vitals  BP (!) 171/99 (BP 1 Location: Right upper arm, BP Patient Position: Sitting)   Pulse 71   Temp 98.6 °F (37 °C) (Oral)   Resp 14   Ht 5' 4\" (1.626 m)   Wt 66.2 kg (146 lb)   SpO2 99%   BMI 25.06 kg/m²     Pain Score: 3    Physical Exam:     ECO    General: Well appearing, in NAD    Psychologic: mood and affect are appropriate, no anxiety or depression noted    Skin: examination of the skin reveals no bruising, rash or petechiae    HEENT: Normocephalic, atraumatic. Conjunctiva and sclera are clear. Pupils are equal, round and reactive to light. EOMs are intact. Neck: supple without lymphadenopathy, JVD or thyromegaly    Lymphatics: no palpable cervical, supraclavicular, infraclavicular, axillary or inguinal lymphadenopathy    Lungs: clear breath sounds bilaterally, no rhonchi or wheezes noted    Heart: Regular rate and rhythm,S1-S2 noted.      Abdomen: soft, non-tender, non-distended, no HSM, Extremities: without clubbing, cyanosis or edema    Neurologic: no focal deficits, steady gait, Alert and oriented x 3. Laboratory Data:     No results found for this or any previous visit. Patient Active Problem List   Diagnosis Code    Back pain M54.9    Numbness and tingling R20.0, R20.2    Essential hypertension with goal blood pressure less than 140/90 I10    Anxiety F41.9    Lumbosacral spondylosis without myelopathy M47.817    DDD (degenerative disc disease), lumbar M51.36    Lumbar neuritis M54.16    Scoliosis of lumbar spine M41.9    Iron deficiency anemia due to chronic blood loss D50.0    Laryngitis J04.0    Non-intractable vomiting R11.10    Flu-like symptoms R68.89    Gastroenteritis K52.9    Abnormal CBC R79.89    Vitamin D deficiency E55.9    ESR raised R70.0    Pain in joint involving multiple sites M25.50         Assessment:     1. Other neutropenia (HCC)    2. Other iron deficiency anemia    3. Vitamin B 12 deficiency    4. Vitamin D deficiency    5. Other specified hypothyroidism        Plan:     Couple blood tests being drawn tomorrow  She will have a follow-up telephone visit and next week  Iron stores be persistently low we shall discuss intravenous iron infusions  Had an opportunity to ask questions which were answered  And is in agreement with the plan above    Follow-up and Dispositions    Return in about 1 week (around 11/10/2022) for Follow-up telephone visit.         Orders Placed This Encounter    CBC WITH AUTOMATED DIFF     Standing Status:   Future     Standing Expiration Date:   11/3/2023    FERRITIN     Standing Status:   Future     Standing Expiration Date:   11/3/2023    VITAMIN B12 & FOLATE     Standing Status:   Future     Standing Expiration Date:   11/3/2023    TSH+FREE T4     Standing Status:   Future     Standing Expiration Date:   76/1/9235    METABOLIC PANEL, COMPREHENSIVE     Standing Status:   Future     Standing Expiration Date:   11/3/2023    PERIPHERAL SMEAR Standing Status:   Future     Standing Expiration Date:   11/2/2023    IRON PROFILE     Standing Status:   Future     Standing Expiration Date:   11/3/2023       Porfirio Mercado MD  11/3/2022

## 2022-11-03 ENCOUNTER — OFFICE VISIT (OUTPATIENT)
Age: 57
End: 2022-11-03
Payer: COMMERCIAL

## 2022-11-03 VITALS
TEMPERATURE: 98.6 F | SYSTOLIC BLOOD PRESSURE: 171 MMHG | DIASTOLIC BLOOD PRESSURE: 99 MMHG | HEIGHT: 64 IN | WEIGHT: 146 LBS | BODY MASS INDEX: 24.92 KG/M2 | HEART RATE: 71 BPM | OXYGEN SATURATION: 99 % | RESPIRATION RATE: 14 BRPM

## 2022-11-03 DIAGNOSIS — E55.9 VITAMIN D DEFICIENCY: ICD-10-CM

## 2022-11-03 DIAGNOSIS — E03.8 OTHER SPECIFIED HYPOTHYROIDISM: ICD-10-CM

## 2022-11-03 DIAGNOSIS — D50.8 OTHER IRON DEFICIENCY ANEMIA: ICD-10-CM

## 2022-11-03 DIAGNOSIS — D70.8 OTHER NEUTROPENIA (HCC): Primary | ICD-10-CM

## 2022-11-03 DIAGNOSIS — E53.8 VITAMIN B 12 DEFICIENCY: ICD-10-CM

## 2022-11-03 PROCEDURE — 99204 OFFICE O/P NEW MOD 45 MIN: CPT | Performed by: INTERNAL MEDICINE

## 2022-11-03 PROCEDURE — 3078F DIAST BP <80 MM HG: CPT | Performed by: INTERNAL MEDICINE

## 2022-11-03 PROCEDURE — 3074F SYST BP LT 130 MM HG: CPT | Performed by: INTERNAL MEDICINE

## 2022-11-04 ENCOUNTER — HOSPITAL ENCOUNTER (OUTPATIENT)
Dept: INFUSION THERAPY | Age: 57
Discharge: HOME | End: 2022-11-04
Payer: COMMERCIAL

## 2022-11-04 VITALS — TEMPERATURE: 97.8 F

## 2022-11-04 DIAGNOSIS — E53.8 VITAMIN B 12 DEFICIENCY: ICD-10-CM

## 2022-11-04 DIAGNOSIS — E03.8 OTHER SPECIFIED HYPOTHYROIDISM: ICD-10-CM

## 2022-11-04 DIAGNOSIS — D50.8 OTHER IRON DEFICIENCY ANEMIA: ICD-10-CM

## 2022-11-04 DIAGNOSIS — D70.8 OTHER NEUTROPENIA (HCC): ICD-10-CM

## 2022-11-04 DIAGNOSIS — E55.9 VITAMIN D DEFICIENCY: ICD-10-CM

## 2022-11-04 LAB
ALBUMIN SERPL-MCNC: 4.3 G/DL (ref 3.4–5)
ALBUMIN/GLOB SERPL: 1.2 {RATIO} (ref 0.8–1.7)
ALP SERPL-CCNC: 114 U/L (ref 45–117)
ALT SERPL-CCNC: 22 U/L (ref 13–56)
ANION GAP SERPL CALC-SCNC: 4 MMOL/L (ref 3–18)
AST SERPL-CCNC: 18 U/L (ref 10–38)
BASOPHILS # BLD: 0 K/UL (ref 0–0.1)
BASOPHILS NFR BLD: 1 % (ref 0–2)
BILIRUB SERPL-MCNC: 0.5 MG/DL (ref 0.2–1)
BUN SERPL-MCNC: 10 MG/DL (ref 7–18)
BUN/CREAT SERPL: 16 (ref 12–20)
CALCIUM SERPL-MCNC: 9.4 MG/DL (ref 8.5–10.1)
CHLORIDE SERPL-SCNC: 105 MMOL/L (ref 100–111)
CO2 SERPL-SCNC: 30 MMOL/L (ref 21–32)
CREAT SERPL-MCNC: 0.63 MG/DL (ref 0.6–1.3)
DIFFERENTIAL METHOD BLD: ABNORMAL
EOSINOPHIL # BLD: 0.1 K/UL (ref 0–0.4)
EOSINOPHIL NFR BLD: 3 % (ref 0–5)
ERYTHROCYTE [DISTWIDTH] IN BLOOD BY AUTOMATED COUNT: 15 % (ref 11.6–14.5)
FERRITIN SERPL-MCNC: 7 NG/ML (ref 8–388)
FOLATE SERPL-MCNC: 6.5 NG/ML (ref 3.1–17.5)
GLOBULIN SER CALC-MCNC: 3.6 G/DL (ref 2–4)
GLUCOSE SERPL-MCNC: 97 MG/DL (ref 74–99)
HCT VFR BLD AUTO: 38 % (ref 35–45)
HGB BLD-MCNC: 11.6 G/DL (ref 12–16)
IMM GRANULOCYTES # BLD AUTO: 0 K/UL (ref 0–0.04)
IMM GRANULOCYTES NFR BLD AUTO: 0 % (ref 0–0.5)
IRON SATN MFR SERPL: 31 % (ref 20–50)
IRON SERPL-MCNC: 152 UG/DL (ref 50–175)
LYMPHOCYTES # BLD: 1.5 K/UL (ref 0.9–3.6)
LYMPHOCYTES NFR BLD: 46 % (ref 21–52)
MCH RBC QN AUTO: 25.3 PG (ref 24–34)
MCHC RBC AUTO-ENTMCNC: 30.5 G/DL (ref 31–37)
MCV RBC AUTO: 82.8 FL (ref 78–100)
MONOCYTES # BLD: 0.3 K/UL (ref 0.05–1.2)
MONOCYTES NFR BLD: 8 % (ref 3–10)
NEUTS SEG # BLD: 1.3 K/UL (ref 1.8–8)
NEUTS SEG NFR BLD: 41 % (ref 40–73)
NRBC # BLD: 0 K/UL (ref 0–0.01)
NRBC BLD-RTO: 0 PER 100 WBC
PLATELET # BLD AUTO: 275 K/UL (ref 135–420)
PMV BLD AUTO: 11.3 FL (ref 9.2–11.8)
POTASSIUM SERPL-SCNC: 3.9 MMOL/L (ref 3.5–5.5)
PROT SERPL-MCNC: 7.9 G/DL (ref 6.4–8.2)
RBC # BLD AUTO: 4.59 M/UL (ref 4.2–5.3)
SODIUM SERPL-SCNC: 139 MMOL/L (ref 136–145)
T4 FREE SERPL-MCNC: 1 NG/DL (ref 0.7–1.5)
TIBC SERPL-MCNC: 497 UG/DL (ref 250–450)
TSH SERPL DL<=0.05 MIU/L-ACNC: 2.49 UIU/ML (ref 0.36–3.74)
VIT B12 SERPL-MCNC: 177 PG/ML (ref 211–911)
WBC # BLD AUTO: 3.2 K/UL (ref 4.6–13.2)

## 2022-11-04 PROCEDURE — 85025 COMPLETE CBC W/AUTO DIFF WBC: CPT

## 2022-11-04 PROCEDURE — 82607 VITAMIN B-12: CPT

## 2022-11-04 PROCEDURE — 36415 COLL VENOUS BLD VENIPUNCTURE: CPT

## 2022-11-04 PROCEDURE — 82728 ASSAY OF FERRITIN: CPT

## 2022-11-04 PROCEDURE — 84439 ASSAY OF FREE THYROXINE: CPT

## 2022-11-04 PROCEDURE — 80053 COMPREHEN METABOLIC PANEL: CPT

## 2022-11-04 PROCEDURE — 83540 ASSAY OF IRON: CPT

## 2022-11-04 NOTE — PROGRESS NOTES
[facility] Progress Note    Date: 2022    Name: Shanell Gambino    MRN: [id number]         : 1965    Peripheral Lab Draw      Ms. Ramirez to [facility], ambulatory at [phone] accompanied by self. Pt was assessed and education was provided. Ms. Ramirez's vitals were reviewed and patient was observed for 5 minutes prior to treatment. Visit Vitals  Temp 97.8 °F (36.6 °C)       Blood obtained peripherally from left arm x 1 attempt with butterfly needle and sent to lab per written orders. No bleeding or hematoma noted at site. Gauze and coban applied. Ms. Melba Sarmiento tolerated the phlebotomy, and had no complaints. Patient armband removed and shredded. Ms. Melba Sarmiento was discharged from [place] [address] in stable condition at 1426.      Peggy Sanchez Phlebotomist PCT  2022  2:28 PM

## 2022-11-07 LAB — PERIPHERAL SMEAR,PSM: NORMAL

## 2023-02-13 DIAGNOSIS — Z12.11 SCREENING FOR COLON CANCER: Primary | ICD-10-CM

## 2023-04-18 ENCOUNTER — NURSE ONLY (OUTPATIENT)
Age: 58
End: 2023-04-18

## 2023-04-18 VITALS — BODY MASS INDEX: 24.41 KG/M2 | RESPIRATION RATE: 18 BRPM | TEMPERATURE: 97.7 F | WEIGHT: 143 LBS | HEIGHT: 64 IN

## 2023-04-18 DIAGNOSIS — Z12.11 COLON CANCER SCREENING: ICD-10-CM

## 2023-04-18 DIAGNOSIS — Z01.818 PRE-OP TESTING: Primary | ICD-10-CM

## 2023-04-18 DIAGNOSIS — I10 ESSENTIAL HYPERTENSION WITH GOAL BLOOD PRESSURE LESS THAN 140/90: ICD-10-CM

## 2023-04-18 NOTE — PROGRESS NOTES
10 MG tablet Take 1 tablet by mouth 9/27/22  Yes Ar Automatic Reconciliation   ergocalciferol (ERGOCALCIFEROL) 1.25 MG (72431 UT) capsule Take 1 capsule by mouth every 7 days 9/28/22  Yes Ar Automatic Reconciliation   ferrous sulfate (FE TABS 325) 325 (65 Fe) MG EC tablet Take 1 tablet by mouth 3 times daily (with meals) 9/28/22  Yes Ar Automatic Reconciliation   hydrALAZINE (APRESOLINE) 100 MG tablet Take 1 tablet by mouth 3 times daily 12/28/21  Yes Ar Automatic Reconciliation   lisinopril (PRINIVIL;ZESTRIL) 40 MG tablet Take 1 tablet by mouth daily 8/25/22  Yes Ar Automatic Reconciliation   metoprolol (LOPRESSOR) 100 MG tablet Take 1 tablet by mouth twice daily 1/3/22  Yes Ar Automatic Reconciliation   naproxen (NAPROSYN) 500 MG tablet Take 1 tablet by mouth 2 times daily (with meals) 9/27/22  Yes Ar Automatic Reconciliation         Risks colonoscopy described- colon injury, missed lesion, anesthesia problems, bleeding       Kierra Saucedo LPN  April 18, 3928  3:15 PM

## 2023-07-10 NOTE — TELEPHONE ENCOUNTER
This patient contacted office for the following prescriptions to be filled:    Medication requested :   Metoprolol 100 mg  Lisinopril 40 mg  Amlodipine 10 mg  Cyclobenzaprine 10 mg  PCP: [address] or Print: [facility]  Mail order or Local pharmacy [facility] [phone]    Scheduled appointment if not seen by current providers in office: lov 9/27/2023 fu 7/26/2023

## 2023-07-12 RX ORDER — AMLODIPINE BESYLATE 10 MG/1
10 TABLET ORAL DAILY
Qty: 90 TABLET | Refills: 0 | Status: SHIPPED | OUTPATIENT
Start: 2023-07-12

## 2023-07-12 RX ORDER — CYCLOBENZAPRINE HCL 10 MG
10 TABLET ORAL 2 TIMES DAILY PRN
Qty: 30 TABLET | Refills: 0 | Status: SHIPPED | OUTPATIENT
Start: 2023-07-12

## 2023-07-12 RX ORDER — LISINOPRIL 40 MG/1
40 TABLET ORAL DAILY
Qty: 90 TABLET | Refills: 0 | Status: SHIPPED | OUTPATIENT
Start: 2023-07-12

## 2023-07-12 RX ORDER — METOPROLOL TARTRATE 100 MG/1
100 TABLET ORAL 2 TIMES DAILY
Qty: 180 TABLET | Refills: 0 | Status: SHIPPED | OUTPATIENT
Start: 2023-07-12

## 2023-10-05 ENCOUNTER — TELEPHONE (OUTPATIENT)
Age: 58
End: 2023-10-05

## 2023-10-05 NOTE — TELEPHONE ENCOUNTER
Called patient. I do not see that anyone called her, but I did schedule for 10/30/2023 for CPE/PAP as patient is overdue at this time.

## 2023-10-05 NOTE — TELEPHONE ENCOUNTER
----- Message from Elizabeth Stone sent at 10/2/2023  1:55 PM EDT -----  Subject: Message to Provider    QUESTIONS  Information for Provider? Pt returning a phone call. Unsure of who called.    Please call and advise.   ---------------------------------------------------------------------------  --------------  Padmaja BOTELLO  [phone]; OK to leave message on voicemail  ---------------------------------------------------------------------------  --------------  SCRIPT ANSWERS  undefined

## 2023-10-31 NOTE — PROGRESS NOTES
1. \"Have you been to the ER, urgent care clinic since your last visit? Hospitalized since your last visit? \" Yes When: [facility] 10/22/23 - 10/27/23 for hypertensive emergency and chest pain. 2. \"Have you seen or consulted any other health care providers outside of the [address] [place] [address] since your last visit? \" No     3. For patients aged 43-73: Has the patient had a colonoscopy / FIT/ Cologuard? No      If the patient is female:    4. For patients aged 43-66: Has the patient had a mammogram within the past 2 years? Yes - Care Gap present. Most recent result on file      5. For patients aged 21-65: Has the patient had a pap smear? Yes - Care Gap present. Most recent result on file    Chief Complaint   Patient presents with    Follow-Up from [facility] 10/22/23 - 10/27/23 for hypertensive emergency and chest pain. Patient declined flu vaccine.

## 2023-11-01 ENCOUNTER — OFFICE VISIT (OUTPATIENT)
Age: 58
End: 2023-11-01
Payer: COMMERCIAL

## 2023-11-01 VITALS
SYSTOLIC BLOOD PRESSURE: 190 MMHG | HEIGHT: 64 IN | RESPIRATION RATE: 16 BRPM | BODY MASS INDEX: 23.63 KG/M2 | TEMPERATURE: 96.8 F | WEIGHT: 138.4 LBS | OXYGEN SATURATION: 99 % | HEART RATE: 60 BPM | DIASTOLIC BLOOD PRESSURE: 110 MMHG

## 2023-11-01 DIAGNOSIS — Z13.220 SCREENING FOR HYPERLIPIDEMIA: ICD-10-CM

## 2023-11-01 DIAGNOSIS — Z13.1 SCREENING FOR DIABETES MELLITUS: ICD-10-CM

## 2023-11-01 DIAGNOSIS — I99.8 POORLY CONTROLLED BLOOD PRESSURE: Primary | ICD-10-CM

## 2023-11-01 DIAGNOSIS — Z09 HOSPITAL DISCHARGE FOLLOW-UP: ICD-10-CM

## 2023-11-01 DIAGNOSIS — D35.02 ADRENAL ADENOMA, LEFT: ICD-10-CM

## 2023-11-01 DIAGNOSIS — D35.01 ADENOMA OF RIGHT ADRENAL GLAND: ICD-10-CM

## 2023-11-01 DIAGNOSIS — K83.8 MASS OF AMPULLA OF VATER: ICD-10-CM

## 2023-11-01 DIAGNOSIS — E53.8 B12 DEFICIENCY: ICD-10-CM

## 2023-11-01 DIAGNOSIS — E61.1 IRON DEFICIENCY: ICD-10-CM

## 2023-11-01 DIAGNOSIS — Z12.31 ENCOUNTER FOR SCREENING MAMMOGRAM FOR MALIGNANT NEOPLASM OF BREAST: ICD-10-CM

## 2023-11-01 DIAGNOSIS — D72.819 LEUKOPENIA, UNSPECIFIED TYPE: ICD-10-CM

## 2023-11-01 DIAGNOSIS — E55.9 VITAMIN D DEFICIENCY: ICD-10-CM

## 2023-11-01 PROCEDURE — 3080F DIAST BP >= 90 MM HG: CPT | Performed by: FAMILY MEDICINE

## 2023-11-01 PROCEDURE — 1111F DSCHRG MED/CURRENT MED MERGE: CPT | Performed by: FAMILY MEDICINE

## 2023-11-01 PROCEDURE — 3077F SYST BP >= 140 MM HG: CPT | Performed by: FAMILY MEDICINE

## 2023-11-01 PROCEDURE — 99214 OFFICE O/P EST MOD 30 MIN: CPT | Performed by: FAMILY MEDICINE

## 2023-11-01 RX ORDER — LABETALOL 100 MG/1
50 TABLET, FILM COATED ORAL EVERY 12 HOURS
COMMUNITY
Start: 2023-10-27

## 2023-11-01 RX ORDER — HYDRALAZINE HYDROCHLORIDE 25 MG/1
25 TABLET, FILM COATED ORAL EVERY 8 HOURS
COMMUNITY
End: 2023-11-01 | Stop reason: SDUPTHER

## 2023-11-01 RX ORDER — HYDRALAZINE HYDROCHLORIDE 50 MG/1
50 TABLET, FILM COATED ORAL 3 TIMES DAILY
Qty: 270 TABLET | Refills: 0 | Status: SHIPPED | OUTPATIENT
Start: 2023-11-01

## 2023-11-01 RX ORDER — AMILORIDE HYDROCHLORIDE 5 MG/1
TABLET ORAL
COMMUNITY
Start: 2023-10-27

## 2023-11-01 RX ORDER — NIFEDIPINE 60 MG/1
TABLET, EXTENDED RELEASE ORAL
COMMUNITY
Start: 2023-10-27

## 2023-11-01 SDOH — ECONOMIC STABILITY: FOOD INSECURITY: WITHIN THE PAST 12 MONTHS, YOU WORRIED THAT YOUR FOOD WOULD RUN OUT BEFORE YOU GOT MONEY TO BUY MORE.: OFTEN TRUE

## 2023-11-01 SDOH — ECONOMIC STABILITY: FOOD INSECURITY: WITHIN THE PAST 12 MONTHS, THE FOOD YOU BOUGHT JUST DIDN'T LAST AND YOU DIDN'T HAVE MONEY TO GET MORE.: OFTEN TRUE

## 2023-11-01 SDOH — ECONOMIC STABILITY: INCOME INSECURITY: HOW HARD IS IT FOR YOU TO PAY FOR THE VERY BASICS LIKE FOOD, HOUSING, MEDICAL CARE, AND HEATING?: VERY HARD

## 2023-11-01 SDOH — ECONOMIC STABILITY: HOUSING INSECURITY
IN THE LAST 12 MONTHS, WAS THERE A TIME WHEN YOU DID NOT HAVE A STEADY PLACE TO SLEEP OR SLEPT IN A SHELTER (INCLUDING NOW)?: NO

## 2023-11-01 ASSESSMENT — PATIENT HEALTH QUESTIONNAIRE - PHQ9
2. FEELING DOWN, DEPRESSED OR HOPELESS: 0
SUM OF ALL RESPONSES TO PHQ QUESTIONS 1-9: 0
1. LITTLE INTEREST OR PLEASURE IN DOING THINGS: 0
SUM OF ALL RESPONSES TO PHQ QUESTIONS 1-9: 0
SUM OF ALL RESPONSES TO PHQ QUESTIONS 1-9: 0
SUM OF ALL RESPONSES TO PHQ9 QUESTIONS 1 & 2: 0
SUM OF ALL RESPONSES TO PHQ QUESTIONS 1-9: 0

## 2023-11-01 NOTE — PROGRESS NOTES
Fernando Ferreira is a 62 y.o. female complains of   Chief Complaint   Patient presents with    Follow-Up from [facility],  [address] 10/22/23 - 10/27/23 for hypertensive emergency and chest pain. HPI:  Here for follow-up after hospital discharge from [facility] for hypertensive emergency. Has known whitecoat and noted embolization: MRI abdomen. He has an appointment scheduled for ERCP as an out patient. Advised to follow GI recommendation for procedure. - Medications were adjusted. Also started on amiloride. Reviewed labs and the records from [facility]. Brief as below. Also advised to start B12 supplement and vitamin D supplement. Noted low WBC count. Sending to nephrology. Discussed importance of compliance with care. Taking medication with compliance. We will go up on hydralazine. Also noted adrenal adenoma on MRI. Sending to endocrinology to rule out secondary cause of hypertension. She is sitting comfortable without any acute distress  Sending to cardiology for further evaluation for Poorly controlled blood pressure. Discussed with pt red flag sign. Go to ER with any worsening of symptoms. She understood it well. She is feeling very fatigue. No focal weakness but going up on the stairs she feels fatigue and needs to sit down. no more chest pain or sob. No palpitation. No diaphoresis. No nausea or vomiting. No headache. MR MRI ABD WITH MRCP W AND WO CONTRAST    Anatomical Region Laterality Modality   Abdomen -- Magnetic Resonance     Impression    1. Slightly prominent central intrahepatic biliary ducts with dilated common bile duct measuring 1.4 cm proximally. Query 9 mm ampullary lesion, which may correspond to the described hypodensity at the pancreatic head on the comparison CT Correlate with biliary chemistries and recommend ERCP for further evaluation   2. Pancreatic divisum. Slightly prominent main pancreatic duct which may be age-related   3.  Cholelithiasis,

## 2023-12-06 ENCOUNTER — TELEPHONE (OUTPATIENT)
Age: 58
End: 2023-12-06

## 2023-12-08 ENCOUNTER — TELEPHONE (OUTPATIENT)
Age: 58
End: 2023-12-08

## 2023-12-08 NOTE — TELEPHONE ENCOUNTER
Called the patient to get scheduled with cardiology per the referral from the pcp's office. Phone numbers were not working.

## 2023-12-29 ENCOUNTER — TELEPHONE (OUTPATIENT)
Age: 58
End: 2023-12-29

## 2023-12-29 NOTE — TELEPHONE ENCOUNTER
I called  Kaya Chance, to assist her  in scheduling a Mammogram.  A voice mail message was left for this patient to return my call  at [phone] ; to assist her with scheduling her  Breast Cancer screening .     Roland Werner LPN   Panel Manager

## 2024-01-03 ENCOUNTER — TELEPHONE (OUTPATIENT)
Age: 59
End: 2024-01-03

## 2024-01-03 NOTE — TELEPHONE ENCOUNTER
Called the patient to get scheduled with cardiology per the referral from the pcp's office. No answer, left a voicemail for a call back to schedule.

## 2024-01-09 ENCOUNTER — TELEPHONE (OUTPATIENT)
Age: 59
End: 2024-01-09

## 2024-01-09 NOTE — TELEPHONE ENCOUNTER
Called the patient to get scheduled with cardiology per the referral from the pcp's office. No answer, numbers out of service possibly.

## 2024-02-21 ENCOUNTER — HOSPITAL ENCOUNTER (OUTPATIENT)
Facility: HOSPITAL | Age: 59
Setting detail: SPECIMEN
Discharge: HOME | End: 2024-02-24
Payer: COMMERCIAL

## 2024-02-21 ENCOUNTER — OFFICE VISIT (OUTPATIENT)
Age: 59
End: 2024-02-21
Payer: COMMERCIAL

## 2024-02-21 VITALS
DIASTOLIC BLOOD PRESSURE: 120 MMHG | HEIGHT: 63 IN | HEART RATE: 66 BPM | RESPIRATION RATE: 16 BRPM | SYSTOLIC BLOOD PRESSURE: 200 MMHG | OXYGEN SATURATION: 97 % | WEIGHT: 142.8 LBS | BODY MASS INDEX: 25.3 KG/M2 | TEMPERATURE: 97.1 F

## 2024-02-21 DIAGNOSIS — Z12.31 ENCOUNTER FOR SCREENING MAMMOGRAM FOR MALIGNANT NEOPLASM OF BREAST: ICD-10-CM

## 2024-02-21 DIAGNOSIS — Z12.11 SCREENING FOR COLON CANCER: ICD-10-CM

## 2024-02-21 DIAGNOSIS — M25.542 ARTHRALGIA OF BOTH HANDS: ICD-10-CM

## 2024-02-21 DIAGNOSIS — M25.541 ARTHRALGIA OF BOTH HANDS: ICD-10-CM

## 2024-02-21 DIAGNOSIS — N88.9 ABNORMAL CERVIX FINDING: ICD-10-CM

## 2024-02-21 DIAGNOSIS — Z00.00 ENCOUNTER FOR WELL ADULT EXAM WITHOUT ABNORMAL FINDINGS: Primary | ICD-10-CM

## 2024-02-21 DIAGNOSIS — Z12.4 SCREENING FOR CERVICAL CANCER: ICD-10-CM

## 2024-02-21 DIAGNOSIS — R03.0 ELEVATED BLOOD PRESSURE READING: ICD-10-CM

## 2024-02-21 DIAGNOSIS — R70.0 ESR RAISED: ICD-10-CM

## 2024-02-21 PROCEDURE — 99396 PREV VISIT EST AGE 40-64: CPT | Performed by: FAMILY MEDICINE

## 2024-02-21 PROCEDURE — 3080F DIAST BP >= 90 MM HG: CPT | Performed by: FAMILY MEDICINE

## 2024-02-21 PROCEDURE — 87661 TRICHOMONAS VAGINALIS AMPLIF: CPT

## 2024-02-21 PROCEDURE — 99214 OFFICE O/P EST MOD 30 MIN: CPT | Performed by: FAMILY MEDICINE

## 2024-02-21 PROCEDURE — 87491 CHLMYD TRACH DNA AMP PROBE: CPT

## 2024-02-21 PROCEDURE — 87591 N.GONORRHOEAE DNA AMP PROB: CPT

## 2024-02-21 PROCEDURE — 88175 CYTOPATH C/V AUTO FLUID REDO: CPT

## 2024-02-21 PROCEDURE — 87624 HPV HI-RISK TYP POOLED RSLT: CPT

## 2024-02-21 PROCEDURE — 3077F SYST BP >= 140 MM HG: CPT | Performed by: FAMILY MEDICINE

## 2024-02-21 RX ORDER — AMLODIPINE BESYLATE 10 MG/1
10 TABLET ORAL DAILY
Qty: 90 TABLET | Refills: 0 | Status: SHIPPED | OUTPATIENT
Start: 2024-02-21

## 2024-02-21 ASSESSMENT — PATIENT HEALTH QUESTIONNAIRE - PHQ9
SUM OF ALL RESPONSES TO PHQ QUESTIONS 1-9: 0
SUM OF ALL RESPONSES TO PHQ QUESTIONS 1-9: 0
1. LITTLE INTEREST OR PLEASURE IN DOING THINGS: 0
SUM OF ALL RESPONSES TO PHQ QUESTIONS 1-9: 0
2. FEELING DOWN, DEPRESSED OR HOPELESS: 0
SUM OF ALL RESPONSES TO PHQ QUESTIONS 1-9: 0
SUM OF ALL RESPONSES TO PHQ9 QUESTIONS 1 & 2: 0

## 2024-02-21 NOTE — PROGRESS NOTES
Well Adult Note  Name: Denise Johnson Today’s Date: 2024   MRN: [id number] Sex: Female   Age: 58 y.o. Ethnicity: Non- / Non    : 1965 Race:  / [place] Native      Denise Johnson is here for well adult exam.  History:  Postmenopausal.  No pelvic pain or vaginal discharge.  No postmenopausal spotting.  On pelvic exam noted cervical growth.  She had last Pap smear in 2017, no previous history of abnormal Pap smear.  No STD.  Currently sexually active.  Noncompliant with self breast exam.  Noncompliant with doing the mammogram.  Discussed that we have reordered the mammogram and be compliant with instructions and follow-ups.  Also not completed fit test .  Agreed to go for colonoscopy consultation.  No family history of breast cancer, cervical cancer, ovarian cancer or colon cancer.  Elevated blood pressure.  Asymptomatic.  Taking medication in the evening.  Trying to work with low-salt diet.  Sitting without any acute distress.  Recheck has improved some but still elevated.  Going up on amlodipine dose.  Also complaining of bilateral hand joint pain.  Has history of elevated ESR.  Mainly concerned with tenderness around the base of the thumb bilaterally.  No swelling or redness noted at this time.  Denies any headache, dizziness, no chest pain or trouble breathing, no arm or leg weakness. No nausea or vomiting, no weight or appetite changes, no mood changes . No urine or bowel complains, no palpitation, no diaphoresis. No abdominal pain. No cold or cough. No leg swelling. No fever. No sleep trouble.   CMP:  Lab Results   Component Value Date/Time     2022 02:24 PM    K 3.9 2022 02:24 PM     2022 02:24 PM    CO2 30 2022 02:24 PM    BUN 10 2022 02:24 PM    CREATININE 0.63 2022 02:24 PM    GLUCOSE 97 2022 02:24 PM    CALCIUM 9.4 2022 02:24 PM    PROT 7.9 2022 02:24 PM    LABALBU 4.3 2022 02:24 PM

## 2024-02-21 NOTE — PATIENT INSTRUCTIONS
Please contact [facility] to schedule your gynecology consult. See contact information below:    [facility]  [address], [address]    OR    [address] [place] [address]     [place], [place] [id number] [phone]         Well Visit, Women 50 to 65: Care Instructions  Overview     Well visits can help you stay healthy. Your doctor has checked your overall health and may have suggested ways to take good care of yourself. Your doctor also may have recommended tests. At home, you can help prevent illness with healthy eating, regular exercise, and other steps.  Follow-up care is a key part of your treatment and safety. Be sure to make and go to all appointments, and call your doctor if you are having problems. It's also a good idea to know your test results and keep a list of the medicines you take.  How can you care for yourself at home?  Get screening tests that you and your doctor decide on. Screening helps find diseases before any symptoms appear.  Eat healthy foods. Choose fruits, vegetables, whole grains, protein, and low-fat dairy foods. Limit fat, especially saturated fat. Reduce salt in your diet.  Limit alcohol. Have no more than 1 drink a day or 7 drinks a week.  Get at least 30 minutes of exercise on most days of the week. Walking is a good choice. You also may want to do other activities, such as running, swimming, cycling, or playing tennis or team sports.  Reach and stay at a healthy weight. This will lower your risk for many problems, such as obesity, diabetes, heart disease, and high blood pressure.  Do not smoke. Smoking can make health problems worse. If you need help quitting, talk to your doctor about stop-smoking programs and medicines. These can increase your chances of quitting for good.  Care for your mental health. It is easy to get weighed down by worry and stress. Learn strategies to manage stress, like deep breathing and mindfulness, and stay connected with

## 2024-02-21 NOTE — PROGRESS NOTES
1. \"Have you been to the ER, urgent care clinic since your last visit?  Hospitalized since your last visit?\" Yes When: [facility] surgery LOV: 11/03/23.    2. \"Have you seen or consulted any other health care providers outside of the [facility] since your last visit?\" No     3. For patients aged 45-75: Has the patient had a colonoscopy / FIT/ Cologuard? No      If the patient is female:    4. For patients aged 40-74: Has the patient had a mammogram within the past 2 years? Yes - Care Gap present. Most recent result on file 2/03/21      5. For patients aged 21-65: Has the patient had a pap smear? Yes - Care Gap present. Most recent result on file 7/19/17    Chief Complaint   Patient presents with    Annual Exam     Chronic back pain and bilateral hand pain    Medication Check     Wants to know if she needs to continue taking metoprolol

## 2024-02-26 LAB
C TRACH RRNA SPEC QL NAA+PROBE: NEGATIVE
N GONORRHOEA RRNA SPEC QL NAA+PROBE: NEGATIVE
SPECIMEN SOURCE: NORMAL
T VAGINALIS RRNA SPEC QL NAA+PROBE: NEGATIVE

## 2024-02-28 ENCOUNTER — TELEMEDICINE (OUTPATIENT)
Age: 59
End: 2024-02-28
Payer: COMMERCIAL

## 2024-02-28 DIAGNOSIS — R05.9 COUGH IN ADULT: ICD-10-CM

## 2024-02-28 DIAGNOSIS — J04.0 LARYNGITIS: Primary | ICD-10-CM

## 2024-02-28 DIAGNOSIS — N76.0 BACTERIAL VAGINOSIS: Primary | ICD-10-CM

## 2024-02-28 DIAGNOSIS — R87.629 ABNORMAL VAGINAL PAP SMEAR: ICD-10-CM

## 2024-02-28 DIAGNOSIS — B96.89 BACTERIAL VAGINOSIS: Primary | ICD-10-CM

## 2024-02-28 DIAGNOSIS — R49.0 HOARSE VOICE QUALITY: ICD-10-CM

## 2024-02-28 PROCEDURE — 99213 OFFICE O/P EST LOW 20 MIN: CPT | Performed by: FAMILY MEDICINE

## 2024-02-28 RX ORDER — PREDNISONE 10 MG/1
10 TABLET ORAL DAILY
Qty: 10 TABLET | Refills: 0 | Status: SHIPPED | OUTPATIENT
Start: 2024-02-28 | End: 2024-03-09

## 2024-02-28 RX ORDER — METRONIDAZOLE 500 MG/1
500 TABLET ORAL 2 TIMES DAILY
Qty: 14 TABLET | Refills: 0 | Status: SHIPPED | OUTPATIENT
Start: 2024-02-28 | End: 2024-03-06

## 2024-02-28 RX ORDER — BENZONATATE 200 MG/1
200 CAPSULE ORAL 3 TIMES DAILY PRN
Qty: 30 CAPSULE | Refills: 0 | Status: SHIPPED | OUTPATIENT
Start: 2024-02-28 | End: 2024-03-06

## 2024-02-28 RX ORDER — AZITHROMYCIN 250 MG/1
TABLET, FILM COATED ORAL
Qty: 6 TABLET | Refills: 0 | Status: SHIPPED | OUTPATIENT
Start: 2024-02-28 | End: 2024-03-09

## 2024-02-28 ASSESSMENT — ENCOUNTER SYMPTOMS
EYE PAIN: 0
SINUS PAIN: 0
ABDOMINAL PAIN: 0
CONSTIPATION: 0
SHORTNESS OF BREATH: 0
VOICE CHANGE: 1
SINUS PRESSURE: 1
COUGH: 1
DIARRHEA: 0
SORE THROAT: 1

## 2024-02-28 NOTE — PROGRESS NOTES
Deinse Johnson is a 58 y.o. female  Chief Complaint   Patient presents with    Flu-like symptoms     Cough, congestion, sore throat, bodyaches but denies chills, n&v 1week. Hoarseness     \"Have you been to the ER, urgent care clinic since your last visit?  Hospitalized since your last visit?\"    NO    “Have you seen or consulted any other health care providers outside of [facility] since your last visit?”    NO    “Have you had a colorectal cancer screening such as a colonoscopy/FIT/Cologuard?    NO, patient will schedule.

## 2024-02-28 NOTE — PROGRESS NOTES
2024    TELEHEALTH EVALUATION -- Audio/Visual (During COVID-19 public health emergency)    HPI:    Denise Johnson (:  1965) has requested an audio/video evaluation for the following concern(s):    For the last 4 days, Denise has been experiencing nasal congestion, headache, post nasal drip and sore throat. Body aches started yesterday. She is coughing more now that she is draining from her sinuses. She is taking coricidin for symptoms mgmt. It is helping a little bit.     She denies fever but skin feels hot. She is feeling more pressure in her sinuses today. Ears feel ok.     Denies GI symptoms.     Review of Systems   Constitutional:  Positive for fatigue. Negative for appetite change, chills and fever.   HENT:  Positive for congestion, postnasal drip, sinus pressure, sore throat and voice change. Negative for sinus pain.    Eyes:  Negative for pain.   Respiratory:  Positive for cough. Negative for shortness of breath.    Cardiovascular:  Negative for chest pain and palpitations.   Gastrointestinal:  Negative for abdominal pain, constipation and diarrhea.   Genitourinary:  Negative for difficulty urinating.   Musculoskeletal:  Negative for arthralgias.   Skin:  Negative for rash and wound.   Neurological:  Positive for headaches. Negative for dizziness, weakness and light-headedness.   Hematological:  Does not bruise/bleed easily.   Psychiatric/Behavioral:  Negative for behavioral problems.        Prior to Visit Medications    Medication Sig Taking? Authorizing Provider   metroNIDAZOLE (FLAGYL) 500 MG tablet Take 1 tablet by mouth 2 times daily for 7 days Yes Carla St MD   predniSONE (DELTASONE) 10 MG tablet Take 1 tablet by mouth daily for 10 days Yes Analia Hamm MD   azithromycin (ZITHROMAX) 250 MG tablet 500mg on day 1 followed by 250mg on days 2 - 5 Yes Analia Hamm MD   benzonatate (TESSALON) 200 MG capsule Take 1 capsule by mouth 3 times daily as needed for Cough Yes

## 2024-03-25 ENCOUNTER — HOSPITAL ENCOUNTER (OUTPATIENT)
Dept: WOMENS IMAGING | Facility: HOSPITAL | Age: 59
Discharge: HOME | End: 2024-03-28
Attending: FAMILY MEDICINE
Payer: COMMERCIAL

## 2024-03-25 DIAGNOSIS — Z12.31 ENCOUNTER FOR SCREENING MAMMOGRAM FOR MALIGNANT NEOPLASM OF BREAST: ICD-10-CM

## 2024-03-25 PROCEDURE — 77063 BREAST TOMOSYNTHESIS BI: CPT

## 2024-03-27 NOTE — TELEPHONE ENCOUNTER
This patient contacted office for the following prescriptions to be filled:    Medication requested :   Amiloride 5 mg  Ergocalcigerol 1.25 mg  Hydralazine 50 mg  Labetalol 100 mg  Lisinopril 40 mg  Nifedipine 60 mg  Cyclobenzaprine 10 mg (pt states still taking)  PCP: Maciel  Pharmacy or Print: [facility]  Mail order or Local pharmacy [address]    Scheduled appointment if not seen by current providers in office: lov 2/21/2024 fu 4/2/2024

## 2024-03-28 RX ORDER — NIFEDIPINE 60 MG/1
60 TABLET, EXTENDED RELEASE ORAL DAILY
Qty: 90 TABLET | Refills: 1 | Status: SHIPPED | OUTPATIENT
Start: 2024-03-28

## 2024-03-28 RX ORDER — AMILORIDE HYDROCHLORIDE 5 MG/1
5 TABLET ORAL DAILY
Qty: 90 TABLET | Refills: 1 | Status: SHIPPED | OUTPATIENT
Start: 2024-03-28

## 2024-03-28 RX ORDER — ERGOCALCIFEROL 1.25 MG/1
50000 CAPSULE ORAL
Qty: 4 CAPSULE | Refills: 0 | OUTPATIENT
Start: 2024-03-28

## 2024-03-28 RX ORDER — HYDRALAZINE HYDROCHLORIDE 50 MG/1
50 TABLET, FILM COATED ORAL 3 TIMES DAILY
Qty: 270 TABLET | Refills: 1 | Status: SHIPPED | OUTPATIENT
Start: 2024-03-28

## 2024-03-28 RX ORDER — LISINOPRIL 40 MG/1
40 TABLET ORAL DAILY
Qty: 90 TABLET | Refills: 0 | Status: SHIPPED | OUTPATIENT
Start: 2024-03-28

## 2024-03-28 RX ORDER — LABETALOL 100 MG/1
50 TABLET, FILM COATED ORAL EVERY 12 HOURS
Qty: 60 TABLET | Refills: 1 | Status: SHIPPED | OUTPATIENT
Start: 2024-03-28

## 2024-04-01 ENCOUNTER — HOSPITAL ENCOUNTER (OUTPATIENT)
Facility: HOSPITAL | Age: 59
Discharge: HOME | End: 2024-04-04
Payer: COMMERCIAL

## 2024-04-01 DIAGNOSIS — Z13.1 SCREENING FOR DIABETES MELLITUS: ICD-10-CM

## 2024-04-01 DIAGNOSIS — D72.819 LEUKOPENIA, UNSPECIFIED TYPE: ICD-10-CM

## 2024-04-01 DIAGNOSIS — I99.8 POORLY CONTROLLED BLOOD PRESSURE: ICD-10-CM

## 2024-04-01 DIAGNOSIS — Z13.220 SCREENING FOR HYPERLIPIDEMIA: ICD-10-CM

## 2024-04-01 LAB
ALBUMIN SERPL-MCNC: 4.1 G/DL (ref 3.4–5)
ALBUMIN/GLOB SERPL: 1.2 (ref 0.8–1.7)
ALP SERPL-CCNC: 101 U/L (ref 45–117)
ALT SERPL-CCNC: 18 U/L (ref 13–56)
ANION GAP SERPL CALC-SCNC: 6 MMOL/L (ref 3–18)
AST SERPL-CCNC: 17 U/L (ref 10–38)
BASOPHILS # BLD: 0 K/UL (ref 0–0.1)
BASOPHILS NFR BLD: 1 % (ref 0–2)
BILIRUB SERPL-MCNC: 0.3 MG/DL (ref 0.2–1)
BUN SERPL-MCNC: 21 MG/DL (ref 7–18)
BUN/CREAT SERPL: 29 (ref 12–20)
CALCIUM SERPL-MCNC: 9.3 MG/DL (ref 8.5–10.1)
CHLORIDE SERPL-SCNC: 106 MMOL/L (ref 100–111)
CHOLEST SERPL-MCNC: 188 MG/DL
CO2 SERPL-SCNC: 30 MMOL/L (ref 21–32)
CREAT SERPL-MCNC: 0.72 MG/DL (ref 0.6–1.3)
DIFFERENTIAL METHOD BLD: ABNORMAL
EOSINOPHIL # BLD: 0.1 K/UL (ref 0–0.4)
EOSINOPHIL NFR BLD: 2 % (ref 0–5)
ERYTHROCYTE [DISTWIDTH] IN BLOOD BY AUTOMATED COUNT: 13.2 % (ref 11.6–14.5)
EST. AVERAGE GLUCOSE BLD GHB EST-MCNC: 126 MG/DL
GLOBULIN SER CALC-MCNC: 3.3 G/DL (ref 2–4)
GLUCOSE SERPL-MCNC: 89 MG/DL (ref 74–99)
HBA1C MFR BLD: 6 % (ref 4.2–5.6)
HCT VFR BLD AUTO: 37.9 % (ref 35–45)
HDLC SERPL-MCNC: 84 MG/DL (ref 40–60)
HDLC SERPL: 2.2 (ref 0–5)
HGB BLD-MCNC: 12.1 G/DL (ref 12–16)
IMM GRANULOCYTES # BLD AUTO: 0 K/UL (ref 0–0.04)
IMM GRANULOCYTES NFR BLD AUTO: 0 % (ref 0–0.5)
LDLC SERPL CALC-MCNC: 86.6 MG/DL (ref 0–100)
LIPID PANEL: ABNORMAL
LYMPHOCYTES # BLD: 1.3 K/UL (ref 0.9–3.6)
LYMPHOCYTES NFR BLD: 32 % (ref 21–52)
MCH RBC QN AUTO: 27.3 PG (ref 24–34)
MCHC RBC AUTO-ENTMCNC: 31.9 G/DL (ref 31–37)
MCV RBC AUTO: 85.6 FL (ref 78–100)
MONOCYTES # BLD: 0.4 K/UL (ref 0.05–1.2)
MONOCYTES NFR BLD: 9 % (ref 3–10)
NEUTS SEG # BLD: 2.3 K/UL (ref 1.8–8)
NEUTS SEG NFR BLD: 56 % (ref 40–73)
NRBC # BLD: 0 K/UL (ref 0–0.01)
NRBC BLD-RTO: 0 PER 100 WBC
PLATELET # BLD AUTO: 251 K/UL (ref 135–420)
PMV BLD AUTO: 11.8 FL (ref 9.2–11.8)
POTASSIUM SERPL-SCNC: 3.1 MMOL/L (ref 3.5–5.5)
PROT SERPL-MCNC: 7.4 G/DL (ref 6.4–8.2)
RBC # BLD AUTO: 4.43 M/UL (ref 4.2–5.3)
SODIUM SERPL-SCNC: 142 MMOL/L (ref 136–145)
TRIGL SERPL-MCNC: 87 MG/DL
VLDLC SERPL CALC-MCNC: 17.4 MG/DL
WBC # BLD AUTO: 4.2 K/UL (ref 4.6–13.2)

## 2024-04-01 PROCEDURE — 83036 HEMOGLOBIN GLYCOSYLATED A1C: CPT

## 2024-04-01 PROCEDURE — 36415 COLL VENOUS BLD VENIPUNCTURE: CPT

## 2024-04-01 PROCEDURE — 80053 COMPREHEN METABOLIC PANEL: CPT

## 2024-04-01 PROCEDURE — 80061 LIPID PANEL: CPT

## 2024-04-01 PROCEDURE — 85025 COMPLETE CBC W/AUTO DIFF WBC: CPT

## 2024-09-23 ENCOUNTER — OFFICE VISIT (OUTPATIENT)
Facility: CLINIC | Age: 59
End: 2024-09-23
Payer: COMMERCIAL

## 2024-09-23 VITALS
HEIGHT: 63 IN | HEART RATE: 80 BPM | RESPIRATION RATE: 16 BRPM | TEMPERATURE: 96.9 F | WEIGHT: 136.2 LBS | BODY MASS INDEX: 24.13 KG/M2 | DIASTOLIC BLOOD PRESSURE: 90 MMHG | SYSTOLIC BLOOD PRESSURE: 186 MMHG | OXYGEN SATURATION: 99 %

## 2024-09-23 DIAGNOSIS — R52 BODY ACHES: Primary | ICD-10-CM

## 2024-09-23 DIAGNOSIS — R03.0 ELEVATED BLOOD PRESSURE READING: ICD-10-CM

## 2024-09-23 DIAGNOSIS — F43.9 STRESS: ICD-10-CM

## 2024-09-23 DIAGNOSIS — R07.89 OTHER CHEST PAIN: ICD-10-CM

## 2024-09-23 DIAGNOSIS — M79.642 PAIN IN BOTH HANDS: ICD-10-CM

## 2024-09-23 DIAGNOSIS — M79.641 PAIN IN BOTH HANDS: ICD-10-CM

## 2024-09-23 DIAGNOSIS — M54.9 DISCOMFORT OF BACK: ICD-10-CM

## 2024-09-23 PROCEDURE — 3080F DIAST BP >= 90 MM HG: CPT | Performed by: FAMILY MEDICINE

## 2024-09-23 PROCEDURE — 3077F SYST BP >= 140 MM HG: CPT | Performed by: FAMILY MEDICINE

## 2024-09-23 PROCEDURE — 99214 OFFICE O/P EST MOD 30 MIN: CPT | Performed by: FAMILY MEDICINE

## 2024-09-23 RX ORDER — BUSPIRONE HYDROCHLORIDE 5 MG/1
5 TABLET ORAL 2 TIMES DAILY
Qty: 60 TABLET | Refills: 0 | Status: SHIPPED | OUTPATIENT
Start: 2024-09-23 | End: 2024-10-23

## 2024-09-27 ENCOUNTER — HOSPITAL ENCOUNTER (OUTPATIENT)
Facility: HOSPITAL | Age: 59
Discharge: HOME | End: 2024-09-30
Payer: COMMERCIAL

## 2024-09-27 DIAGNOSIS — R07.89 OTHER CHEST PAIN: ICD-10-CM

## 2024-09-27 LAB
ALBUMIN SERPL-MCNC: 4.2 G/DL (ref 3.4–5)
ALBUMIN/GLOB SERPL: 1.4 (ref 0.8–1.7)
ALP SERPL-CCNC: 99 U/L (ref 45–117)
ALT SERPL-CCNC: 19 U/L (ref 13–56)
ANION GAP SERPL CALC-SCNC: 2 MMOL/L (ref 3–18)
AST SERPL-CCNC: 16 U/L (ref 10–38)
BILIRUB SERPL-MCNC: 0.2 MG/DL (ref 0.2–1)
BUN SERPL-MCNC: 15 MG/DL (ref 7–18)
BUN/CREAT SERPL: 24 (ref 12–20)
CALCIUM SERPL-MCNC: 9.3 MG/DL (ref 8.5–10.1)
CHLORIDE SERPL-SCNC: 107 MMOL/L (ref 100–111)
CK SERPL-CCNC: 214 U/L (ref 26–192)
CO2 SERPL-SCNC: 30 MMOL/L (ref 21–32)
CREAT SERPL-MCNC: 0.63 MG/DL (ref 0.6–1.3)
EKG ATRIAL RATE: 56 BPM
EKG DIAGNOSIS: NORMAL
EKG P AXIS: 34 DEGREES
EKG P-R INTERVAL: 194 MS
EKG Q-T INTERVAL: 482 MS
EKG QRS DURATION: 94 MS
EKG QTC CALCULATION (BAZETT): 465 MS
EKG R AXIS: -9 DEGREES
EKG T AXIS: 41 DEGREES
EKG VENTRICULAR RATE: 56 BPM
ERYTHROCYTE [DISTWIDTH] IN BLOOD BY AUTOMATED COUNT: 13.5 % (ref 11.6–14.5)
GLOBULIN SER CALC-MCNC: 3.1 G/DL (ref 2–4)
GLUCOSE SERPL-MCNC: 107 MG/DL (ref 74–99)
HCT VFR BLD AUTO: 38.8 % (ref 35–45)
HGB BLD-MCNC: 12 G/DL (ref 12–16)
MCH RBC QN AUTO: 27.2 PG (ref 24–34)
MCHC RBC AUTO-ENTMCNC: 30.9 G/DL (ref 31–37)
MCV RBC AUTO: 88 FL (ref 78–100)
NRBC # BLD: 0 K/UL (ref 0–0.01)
NRBC BLD-RTO: 0 PER 100 WBC
PLATELET # BLD AUTO: 252 K/UL (ref 135–420)
PMV BLD AUTO: 11.9 FL (ref 9.2–11.8)
POTASSIUM SERPL-SCNC: 4 MMOL/L (ref 3.5–5.5)
PROT SERPL-MCNC: 7.3 G/DL (ref 6.4–8.2)
RBC # BLD AUTO: 4.41 M/UL (ref 4.2–5.3)
SODIUM SERPL-SCNC: 139 MMOL/L (ref 136–145)
WBC # BLD AUTO: 3.7 K/UL (ref 4.6–13.2)

## 2024-09-27 PROCEDURE — 93010 ELECTROCARDIOGRAM REPORT: CPT | Performed by: INTERNAL MEDICINE

## 2024-09-27 PROCEDURE — 80053 COMPREHEN METABOLIC PANEL: CPT

## 2024-09-27 PROCEDURE — 36415 COLL VENOUS BLD VENIPUNCTURE: CPT

## 2024-09-27 PROCEDURE — 93005 ELECTROCARDIOGRAM TRACING: CPT | Performed by: FAMILY MEDICINE

## 2024-09-27 PROCEDURE — 85027 COMPLETE CBC AUTOMATED: CPT

## 2024-09-27 PROCEDURE — 82550 ASSAY OF CK (CPK): CPT

## 2024-10-02 ENCOUNTER — OFFICE VISIT (OUTPATIENT)
Facility: CLINIC | Age: 59
End: 2024-10-02

## 2024-10-02 VITALS
BODY MASS INDEX: 23.53 KG/M2 | RESPIRATION RATE: 16 BRPM | SYSTOLIC BLOOD PRESSURE: 160 MMHG | DIASTOLIC BLOOD PRESSURE: 100 MMHG | OXYGEN SATURATION: 99 % | HEART RATE: 64 BPM | TEMPERATURE: 97.3 F | HEIGHT: 63 IN | WEIGHT: 132.8 LBS

## 2024-10-02 DIAGNOSIS — R03.0 ELEVATED BLOOD PRESSURE READING: ICD-10-CM

## 2024-10-02 DIAGNOSIS — R07.89 OTHER CHEST PAIN: ICD-10-CM

## 2024-10-02 DIAGNOSIS — R00.1 BRADYCARDIA: ICD-10-CM

## 2024-10-02 DIAGNOSIS — M89.8X1 SHOULDER BLADE PAIN: ICD-10-CM

## 2024-10-02 DIAGNOSIS — M54.9 DISCOMFORT OF BACK: ICD-10-CM

## 2024-10-02 DIAGNOSIS — Z09 HOSPITAL DISCHARGE FOLLOW-UP: Primary | ICD-10-CM

## 2024-10-02 NOTE — PROGRESS NOTES
\"Have you been to the ER, urgent care clinic since your last visit?  Hospitalized since your last visit?\"    [facility] ED LOV: 9/30/24     “Have you seen or consulted any other health care providers outside our system since your last visit?”    NO    Chief Complaint   Patient presents with    Follow-Up from Hospital     [facility] ED on 9/30/24            
appointment consented to the use of AI, including the recording.      An electronic signature was used to authenticate this note.    --Carla St MD

## 2024-10-14 ENCOUNTER — OFFICE VISIT (OUTPATIENT)
Age: 59
End: 2024-10-14
Payer: COMMERCIAL

## 2024-10-14 VITALS — HEIGHT: 64 IN | WEIGHT: 136 LBS | BODY MASS INDEX: 23.22 KG/M2

## 2024-10-14 DIAGNOSIS — M79.641 BILATERAL HAND PAIN: ICD-10-CM

## 2024-10-14 DIAGNOSIS — G56.03 BILATERAL CARPAL TUNNEL SYNDROME: Primary | ICD-10-CM

## 2024-10-14 DIAGNOSIS — M79.642 BILATERAL HAND PAIN: ICD-10-CM

## 2024-10-14 PROCEDURE — 99204 OFFICE O/P NEW MOD 45 MIN: CPT | Performed by: ORTHOPAEDIC SURGERY

## 2024-10-14 PROCEDURE — 73130 X-RAY EXAM OF HAND: CPT | Performed by: ORTHOPAEDIC SURGERY

## 2024-10-14 NOTE — PROGRESS NOTES
independently reviewed on date of examination is negative for any acute fracture, dislocation, or any other acute or chronic osseous abnormalities.  Minimal degenerative changes that are likely age consistent.      Impression:        Diagnosis Orders   1. Bilateral carpal tunnel syndrome  DME Order for (Specify) as OP    NCV WITH EMG BILATERAL UPPER EXTREMITIES      2. Bilateral hand pain  AMB POC XRAY, HAND; 3+ VIEWS    AMB POC XRAY, HAND; 3+ VIEWS            Plan:       Bilateral upper extremity EMGs and nighttime carpal tunnel braces supplied today.  Prognosis uncertain at this time and better treatment options will be determined after reviewing EMG.    The above plain films have been independently reviewed and results and findings discussed with patient.    Plan was reviewed with patient, who verbalized agreement and understanding of the plan.     Return for EMG Review.     Dale Guerin,   10/14/2024 1:30 PM    Note: This note was completed using voice recognition software.  Any typographical/name errors or mistakes are unintentional.

## 2024-11-05 ENCOUNTER — OFFICE VISIT (OUTPATIENT)
Facility: CLINIC | Age: 59
End: 2024-11-05
Payer: COMMERCIAL

## 2024-11-05 VITALS
WEIGHT: 138 LBS | TEMPERATURE: 96.9 F | HEIGHT: 63 IN | SYSTOLIC BLOOD PRESSURE: 110 MMHG | OXYGEN SATURATION: 97 % | RESPIRATION RATE: 16 BRPM | BODY MASS INDEX: 24.45 KG/M2 | HEART RATE: 65 BPM | DIASTOLIC BLOOD PRESSURE: 60 MMHG

## 2024-11-05 DIAGNOSIS — I10 PRIMARY HYPERTENSION: ICD-10-CM

## 2024-11-05 DIAGNOSIS — M54.9 DISCOMFORT OF BACK: Primary | ICD-10-CM

## 2024-11-05 PROCEDURE — 3074F SYST BP LT 130 MM HG: CPT | Performed by: FAMILY MEDICINE

## 2024-11-05 PROCEDURE — 3078F DIAST BP <80 MM HG: CPT | Performed by: FAMILY MEDICINE

## 2024-11-05 PROCEDURE — 99213 OFFICE O/P EST LOW 20 MIN: CPT | Performed by: FAMILY MEDICINE

## 2024-11-05 RX ORDER — LIDOCAINE 50 MG/G
1 PATCH TOPICAL DAILY
Qty: 10 PATCH | Refills: 0 | Status: SHIPPED | OUTPATIENT
Start: 2024-11-05 | End: 2024-11-15

## 2024-11-05 SDOH — ECONOMIC STABILITY: INCOME INSECURITY: HOW HARD IS IT FOR YOU TO PAY FOR THE VERY BASICS LIKE FOOD, HOUSING, MEDICAL CARE, AND HEATING?: VERY HARD

## 2024-11-05 SDOH — ECONOMIC STABILITY: FOOD INSECURITY: WITHIN THE PAST 12 MONTHS, YOU WORRIED THAT YOUR FOOD WOULD RUN OUT BEFORE YOU GOT MONEY TO BUY MORE.: NEVER TRUE

## 2024-11-05 SDOH — ECONOMIC STABILITY: FOOD INSECURITY: WITHIN THE PAST 12 MONTHS, THE FOOD YOU BOUGHT JUST DIDN'T LAST AND YOU DIDN'T HAVE MONEY TO GET MORE.: NEVER TRUE

## 2024-11-05 NOTE — PROGRESS NOTES
Patient presents for BP check.    Any recent (exertional) chest pain? Chest pain was present on 24 - after .  SOB? No  Palpitations? No  LE edema? Swelling in feet and ankles, per patient.  Side effects of medication? No    \"Have you been to the ER, urgent care clinic since your last visit?  Hospitalized since your last visit?\"    NO    “Have you seen or consulted any other health care providers outside our system since your last visit?”    NO             
and is currently experiencing a flare-up. The lower back pain radiates to her buttocks and began last Saturday while she was at [employer]. She rates her pain as a 7 on a scale of 1 to 10. She reports no recent falls, injuries, or heavy lifting that could have triggered the pain. There is no loss of bladder or bowel control, and no tingling, numbness, or weakness in her lower extremities. However, she does report occasional tingling in her fingers.    She has taken her blood pressure medication today. For the back pain, she took naproxen, as she cannot take Flexeril due to its sedative effects. The first dose of naproxen did not alleviate her pain, so she took a second dose 30 minutes ago, but it has not yet taken effect. She is unable to take muscle relaxants during the day due to their sedative effects.    Review of Systems       Objective   Blood pressure 110/60, pulse 65, temperature 96.9 °F (36.1 °C), temperature source Temporal, resp. rate 16, height 1.6 m (5' 3\"), weight 62.6 kg (138 lb), SpO2 97%.  Physical Exam  General: sitting without any acute distress  Lungs; clear  Heart: regular  Neuro: AAOx3  Back: generalize tenderness over lumbosacral spine. ROM discomfort with banding, turning.            The patient (or guardian, if applicable) and other individuals in attendance with the patient were advised that Artificial Intelligence will be utilized during this visit to record, process the conversation to generate a clinical note and to support improvement of the AI technology. The patient (or guardian, if applicable) and other individuals in attendance at the appointment consented to the use of AI, including the recording.      An electronic signature was used to authenticate this note.    --Carla St MD

## 2024-11-05 NOTE — PATIENT INSTRUCTIONS
[facility] Financial Resources*  (Call [employer]/[facility] if need more resources.)       Medical  [facility] Financial [facility]  What they offer: The [employer] Financial Assistance Program helps uninsured patients who do not qualify for government-sponsored health insurance and cannot afford to pay for their medical care. Insured patient may also qualify for assistance based on family income, family size, and medical needs.   Phone Number: [phone]  How to apply for the [employer] Financial Assistance Program:  Option 1: To apply for financial assistance, a patient (or their family or other               provider) should fill out the Financial Assistance Application. Copies of the Financial             Assistance Application and the FAP may be obtained for free by calling the [facility]              [employer] service department at [phone].  Option 2:  The Financial Assistance Application and policy may be obtained for free               by downloading a copy from the [employer] website:                       https://www.[employer]/patient-resources/financial-assistance                       [employer]        www.pafcares.org  What they offer: The [facility] Uninsured Program can assist you if you’re uninsured and have been diagnosed with chronic, debilitating, or life-threatening disease.   Phone Number: 1-985.990.3790  Website: https://www.patientadvocate.org/     [employer]  What they offer: Mobile health clinic for uninsured community members  Phone number: [phone]      [employer]  What they offer: Partnership with the [place] Department of . Assist with   finding and applying for government funded programs and benefits.  You can also update  your benefits or report changes through [employer].  Website: https://www.[employer].[place].gov/  Phone Number: 067-9TALLVA (182-122-4340)      Dimitrios Maradiaga

## 2024-12-11 ENCOUNTER — PROCEDURE VISIT (OUTPATIENT)
Age: 59
End: 2024-12-11

## 2024-12-11 VITALS
HEIGHT: 64 IN | WEIGHT: 140.87 LBS | BODY MASS INDEX: 24.05 KG/M2 | TEMPERATURE: 97.3 F | HEART RATE: 59 BPM | OXYGEN SATURATION: 98 %

## 2024-12-11 DIAGNOSIS — R94.131 ABNORMAL EMG: ICD-10-CM

## 2024-12-11 DIAGNOSIS — G56.03 BILATERAL CARPAL TUNNEL SYNDROME: ICD-10-CM

## 2024-12-11 DIAGNOSIS — R20.0 NUMBNESS AND TINGLING IN BOTH HANDS: Primary | ICD-10-CM

## 2024-12-11 DIAGNOSIS — R20.2 NUMBNESS AND TINGLING IN BOTH HANDS: Primary | ICD-10-CM

## 2024-12-11 NOTE — PROGRESS NOTES
[facility]  [facility], [address]  [place] [address]  Phone: [phone]  Fax: [phone]    Denise Johnson  : 1965  PCP: Carla St MD  2024    ELECTROMYOGRAPHY AND NERVE CONDUCTION STUDIES    Denise Johnson was referred by Dr. Guerin for electrodiagnostic evaluation of numbness/tingling of both hands.    NCV & EMG Findings:  Evaluation of the left median-ulnar (dig IV) sensory and the right median-ulnar (dig IV) sensory nerves showed abnormal peak latency difference ((Median Wrist)-(Ulnar Wrist), L0.80 ms, R0.80 ms).  All remaining nerves (as indicated in the following tables) were within normal limits    All examined muscles (as indicated in the following table) showed no evidence of electrical instability     INTERPRETATION  This is an abnormal electrodiagnostic examination. 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        CLINICAL INTERPRETATION  The electrodiagnostic findings of median mononeuropathy appear consistent with her right hand symptoms.    HISTORY OF PRESENT ILLNESS  Denise Johnson is a 59 y.o. female.    Pt presents today with BUE EMG evaluation for numbness/tingling of both hands..    PAST MEDICAL HISTORY   Past Medical History:   Diagnosis Date    Abnormal mammogram 3/2016    BIRADS 3 right breast    Hypertension     Menopause     Stroke (HCC) 2016    CVA       Past Surgical History:   Procedure Laterality Date    ERCP  2023    GYN  1982         ORTHOPEDIC SURGERY Right     repair of laceration       MEDICATIONS    Current Outpatient Medications   Medication Sig Dispense Refill    cyclobenzaprine (FLEXERIL) 5 MG tablet Take 1 tablet by mouth nightly as needed for Muscle spasms 30

## 2025-01-06 ENCOUNTER — OFFICE VISIT (OUTPATIENT)
Age: 60
End: 2025-01-06
Payer: COMMERCIAL

## 2025-01-06 ENCOUNTER — OFFICE VISIT (OUTPATIENT)
Facility: CLINIC | Age: 60
End: 2025-01-06
Payer: COMMERCIAL

## 2025-01-06 VITALS
HEIGHT: 64 IN | OXYGEN SATURATION: 96 % | SYSTOLIC BLOOD PRESSURE: 120 MMHG | WEIGHT: 138 LBS | DIASTOLIC BLOOD PRESSURE: 60 MMHG | HEART RATE: 88 BPM | BODY MASS INDEX: 23.56 KG/M2

## 2025-01-06 VITALS
WEIGHT: 136.2 LBS | DIASTOLIC BLOOD PRESSURE: 82 MMHG | HEART RATE: 70 BPM | SYSTOLIC BLOOD PRESSURE: 138 MMHG | BODY MASS INDEX: 24.13 KG/M2 | OXYGEN SATURATION: 98 % | TEMPERATURE: 97.3 F | RESPIRATION RATE: 16 BRPM | HEIGHT: 63 IN

## 2025-01-06 VITALS — WEIGHT: 140 LBS | HEIGHT: 64 IN | BODY MASS INDEX: 23.9 KG/M2

## 2025-01-06 DIAGNOSIS — J02.9 SORE THROAT: ICD-10-CM

## 2025-01-06 DIAGNOSIS — G56.03 BILATERAL CARPAL TUNNEL SYNDROME: Primary | ICD-10-CM

## 2025-01-06 DIAGNOSIS — R07.9 CHEST PAIN, UNSPECIFIED TYPE: Primary | ICD-10-CM

## 2025-01-06 DIAGNOSIS — R06.2 WHEEZING: ICD-10-CM

## 2025-01-06 DIAGNOSIS — R05.1 ACUTE COUGH: Primary | ICD-10-CM

## 2025-01-06 DIAGNOSIS — R09.89 CHEST CONGESTION: ICD-10-CM

## 2025-01-06 PROCEDURE — 3078F DIAST BP <80 MM HG: CPT | Performed by: INTERNAL MEDICINE

## 2025-01-06 PROCEDURE — 99204 OFFICE O/P NEW MOD 45 MIN: CPT | Performed by: INTERNAL MEDICINE

## 2025-01-06 PROCEDURE — 99214 OFFICE O/P EST MOD 30 MIN: CPT | Performed by: ORTHOPAEDIC SURGERY

## 2025-01-06 PROCEDURE — 3075F SYST BP GE 130 - 139MM HG: CPT | Performed by: FAMILY MEDICINE

## 2025-01-06 PROCEDURE — 20526 THER INJECTION CARP TUNNEL: CPT | Performed by: ORTHOPAEDIC SURGERY

## 2025-01-06 PROCEDURE — 3079F DIAST BP 80-89 MM HG: CPT | Performed by: FAMILY MEDICINE

## 2025-01-06 PROCEDURE — 99213 OFFICE O/P EST LOW 20 MIN: CPT | Performed by: FAMILY MEDICINE

## 2025-01-06 PROCEDURE — 3074F SYST BP LT 130 MM HG: CPT | Performed by: INTERNAL MEDICINE

## 2025-01-06 RX ORDER — NITROGLYCERIN 0.4 MG/1
0.4 TABLET SUBLINGUAL EVERY 5 MIN PRN
Qty: 25 TABLET | Refills: 3 | Status: SHIPPED | OUTPATIENT
Start: 2025-01-06

## 2025-01-06 RX ORDER — ALBUTEROL SULFATE 90 UG/1
2 INHALANT RESPIRATORY (INHALATION) 4 TIMES DAILY PRN
Qty: 18 G | Refills: 0 | Status: SHIPPED | OUTPATIENT
Start: 2025-01-06

## 2025-01-06 RX ORDER — ASPIRIN 81 MG/1
81 TABLET ORAL DAILY
Qty: 90 TABLET | Refills: 2 | Status: SHIPPED | OUTPATIENT
Start: 2025-01-06

## 2025-01-06 RX ORDER — LIDOCAINE HYDROCHLORIDE 10 MG/ML
1 INJECTION, SOLUTION INFILTRATION; PERINEURAL ONCE
Status: COMPLETED | OUTPATIENT
Start: 2025-01-06 | End: 2025-01-06

## 2025-01-06 RX ADMIN — LIDOCAINE HYDROCHLORIDE 1 ML: 10 INJECTION, SOLUTION INFILTRATION; PERINEURAL at 08:39

## 2025-01-06 ASSESSMENT — PATIENT HEALTH QUESTIONNAIRE - PHQ9
SUM OF ALL RESPONSES TO PHQ QUESTIONS 1-9: 0
SUM OF ALL RESPONSES TO PHQ QUESTIONS 1-9: 0
2. FEELING DOWN, DEPRESSED OR HOPELESS: NOT AT ALL
1. LITTLE INTEREST OR PLEASURE IN DOING THINGS: NOT AT ALL
SUM OF ALL RESPONSES TO PHQ QUESTIONS 1-9: 0
SUM OF ALL RESPONSES TO PHQ9 QUESTIONS 1 & 2: 0
SUM OF ALL RESPONSES TO PHQ QUESTIONS 1-9: 0

## 2025-01-06 NOTE — PROGRESS NOTES
\"Have you been to the ER, urgent care clinic since your last visit?  Hospitalized since your last visit?\"    NO    “Have you seen or consulted any other health care providers outside our system since your last visit?”    NO    Chief Complaint   Patient presents with    Hypertension    Back discomfort - follow-up    Other     Drainage down back of throat and going down into chest - wants to discuss getting Rx to help with symptom and chest cold.    Cough    Bradycardia    Chest Pain     Follow-up

## 2025-01-06 NOTE — PROGRESS NOTES
[facility]    Denise Johnson is 59 y.o. female     Patient is here today for cardiac evaluation  Denies prior history of MI or CHF  Sent to me for evaluation of chest pain.  For last 3 or 4 months patient has been experiencing some chest discomfort.  Describes this as a pressure and sometimes sharp heavy sensation especially on the right side of the chest with some right scapular and right shoulder area pain.  Usually happens at nighttime sometimes at rest sometimes with exertion.  Lasting for sometimes few minutes and sometimes hours.  Sometimes worse with position laying down sometimes worse with movement of the shoulder.  New symptoms over last 3-4 months.  No significant shortness of breath.  No orthopnea or PND.  No palpitation, presyncope syncope  Denies any nausea, vomiting, abdominal pain, fever, chills, sputum production. No hematuria or other bleeding complaints    Past Medical History:   Diagnosis Date    Abnormal mammogram 3/2016    BIRADS 3 right breast    Hypertension     Menopause     Stroke (HCC) 8/1/2016    CVA       Review of Systems:  Cardiac symptoms as noted above in HPI. All others negative.  Denies fatigue, malaise, skin rash, joint pain, blurring vision, photophobia, neck pain, hemoptysis, chronic cough, nausea, vomiting, hematuria, burning micturition, BRBPR, chronic headaches.    Current Outpatient Medications   Medication Sig    cyclobenzaprine (FLEXERIL) 5 MG tablet Take 1 tablet by mouth nightly as needed for Muscle spasms    potassium chloride (KLOR-CON M) 10 MEQ extended release tablet Take 1 tablet by mouth daily    hydrALAZINE (APRESOLINE) 50 MG tablet Take 1 tablet by mouth 3 times daily    labetalol (NORMODYNE) 100 MG tablet Take 0.5 tablets by mouth in the morning and 0.5 tablets in the evening.    lisinopril (PRINIVIL;ZESTRIL) 40 MG tablet Take 1 tablet by mouth daily    NIFEdipine (PROCARDIA XL) 60 MG

## 2025-01-06 NOTE — PROGRESS NOTES
Denise Johnson (:  1965) is a 59 y.o. female, Established patient, here for evaluation of the following chief complaint(s):  Hypertension, Back discomfort - follow-up, Other (Drainage down back of throat and going down into chest - wants to discuss getting Rx to help with symptom and chest cold.), Cough, Bradycardia, and Chest Pain (Follow-up)         Assessment & Plan  1. Chest cold.  Her symptoms are likely attributable to postnasal drip. She has not done home covid test or any sick contact.  She is advised to take Zyrtec at night to reduce nasal drainage and prevent coughing. An albuterol inhaler is prescribed to be used 2 to 3 times daily for chest symptoms. A chest x-ray has been ordered for further evaluation. For her sore throat, she should perform salt water gargles. If she experiences a headache or fever, she can take Motrin or Advil. If there is no improvement within 1 to 2 weeks or if her condition worsens, she should return for a follow-up visit.  Blood pressure has been stable. Continue current plan regarding hypertension.       Pt understood and agree with the plan      Follow up in 1-2 wks if symptoms does not improve.   Results  Laboratory Studies  COVID-19 test is positive. Influenza test is positive.  1. Acute cough  -     XR CHEST (2 VIEWS); Future  -     albuterol sulfate HFA (VENTOLIN HFA) 108 (90 Base) MCG/ACT inhaler; Inhale 2 puffs into the lungs 4 times daily as needed for Wheezing, Disp-18 g, R-0Normal  2. Wheezing  -     XR CHEST (2 VIEWS); Future  -     albuterol sulfate HFA (VENTOLIN HFA) 108 (90 Base) MCG/ACT inhaler; Inhale 2 puffs into the lungs 4 times daily as needed for Wheezing, Disp-18 g, R-0Normal  3. Sore throat  4. Chest congestion    Return in about 2 weeks (around 2025), or if symptoms worsen or fail to improve.       Subjective   History of Present Illness  The patient presents for evaluation of a chest cold.    She reports experiencing hoarseness of

## 2025-01-06 NOTE — PROGRESS NOTES
Denise Johnson is a 59 y.o. female right handed multiple jobs.  Worker's Compensation and legal considerations: none    Chief Complaint   Patient presents with    Follow-up     Emg review bilateral hand     Pain Score:   6    Subjective:     2025 HPI: Patient presents today for bilateral upper extremity EMG review.  She reports previous braces that we have given her have not helped at all.  She reports continued pain in the hands as well as numbness.    Initial HPI: Patient presents today with complaints of right worse than left hand pain and numbness and tingling.  She reports dropping things as well.    Date of onset: Indeterminate  Injury: No  Prior Treatment:  Yes: Comment: Bilateral nighttime braces  Contributory history: None    ROS: Review of Systems - General ROS: negative except HPI    Past Medical History:   Diagnosis Date    Abnormal mammogram 3/2016    BIRADS 3 right breast    Hypertension     Menopause     Stroke (HCC) 2016    CVA       Past Surgical History:   Procedure Laterality Date    ERCP  2023    GYN  1982         ORTHOPEDIC SURGERY Right     repair of laceration        Current Outpatient Medications   Medication Sig Dispense Refill    cyclobenzaprine (FLEXERIL) 5 MG tablet Take 1 tablet by mouth nightly as needed for Muscle spasms 30 tablet 0    potassium chloride (KLOR-CON M) 10 MEQ extended release tablet Take 1 tablet by mouth daily 10 tablet 0    hydrALAZINE (APRESOLINE) 50 MG tablet Take 1 tablet by mouth 3 times daily 270 tablet 1    labetalol (NORMODYNE) 100 MG tablet Take 0.5 tablets by mouth in the morning and 0.5 tablets in the evening. 60 tablet 1    lisinopril (PRINIVIL;ZESTRIL) 40 MG tablet Take 1 tablet by mouth daily 90 tablet 0    NIFEdipine (PROCARDIA XL) 60 MG extended release tablet Take 1 tablet by mouth daily 90 tablet 1    amLODIPine (NORVASC) 10 MG tablet Take 1 tablet by mouth daily 90 tablet 0    ergocalciferol (ERGOCALCIFEROL) 1.25 MG

## 2025-01-07 ENCOUNTER — TELEPHONE (OUTPATIENT)
Facility: CLINIC | Age: 60
End: 2025-01-07

## 2025-01-07 NOTE — TELEPHONE ENCOUNTER
Incoming call received from Ms. Denise Johnson and she wants nurse to give her a call about a medication called Mucinex.

## 2025-02-07 ENCOUNTER — OFFICE VISIT (OUTPATIENT)
Facility: CLINIC | Age: 60
End: 2025-02-07
Payer: COMMERCIAL

## 2025-02-07 VITALS
DIASTOLIC BLOOD PRESSURE: 98 MMHG | BODY MASS INDEX: 23.67 KG/M2 | HEIGHT: 63 IN | HEART RATE: 65 BPM | RESPIRATION RATE: 16 BRPM | OXYGEN SATURATION: 98 % | TEMPERATURE: 96.9 F | SYSTOLIC BLOOD PRESSURE: 180 MMHG | WEIGHT: 133.6 LBS

## 2025-02-07 DIAGNOSIS — M54.2 NECK PAIN: ICD-10-CM

## 2025-02-07 DIAGNOSIS — R13.19 OTHER DYSPHAGIA: ICD-10-CM

## 2025-02-07 DIAGNOSIS — M89.8X1 SHOULDER BLADE PAIN: ICD-10-CM

## 2025-02-07 DIAGNOSIS — F41.9 ANXIETY: ICD-10-CM

## 2025-02-07 DIAGNOSIS — M25.531 RIGHT WRIST PAIN: Primary | ICD-10-CM

## 2025-02-07 DIAGNOSIS — R73.01 IMPAIRED FASTING BLOOD SUGAR: ICD-10-CM

## 2025-02-07 DIAGNOSIS — Z13.220 SCREENING FOR HYPERLIPIDEMIA: ICD-10-CM

## 2025-02-07 DIAGNOSIS — I99.8 POORLY CONTROLLED BLOOD PRESSURE: ICD-10-CM

## 2025-02-07 DIAGNOSIS — R23.2 HOT FLASHES: ICD-10-CM

## 2025-02-07 PROCEDURE — 99214 OFFICE O/P EST MOD 30 MIN: CPT | Performed by: FAMILY MEDICINE

## 2025-02-07 PROCEDURE — 3077F SYST BP >= 140 MM HG: CPT | Performed by: FAMILY MEDICINE

## 2025-02-07 PROCEDURE — 3080F DIAST BP >= 90 MM HG: CPT | Performed by: FAMILY MEDICINE

## 2025-02-07 RX ORDER — HYDRALAZINE HYDROCHLORIDE 100 MG/1
100 TABLET, FILM COATED ORAL 3 TIMES DAILY
Qty: 90 TABLET | Refills: 5 | Status: SHIPPED | OUTPATIENT
Start: 2025-02-07

## 2025-02-07 RX ORDER — PANTOPRAZOLE SODIUM 20 MG/1
20 TABLET, DELAYED RELEASE ORAL
Qty: 90 TABLET | Refills: 1 | Status: SHIPPED | OUTPATIENT
Start: 2025-02-07

## 2025-02-07 SDOH — ECONOMIC STABILITY: FOOD INSECURITY: WITHIN THE PAST 12 MONTHS, YOU WORRIED THAT YOUR FOOD WOULD RUN OUT BEFORE YOU GOT MONEY TO BUY MORE.: OFTEN TRUE

## 2025-02-07 SDOH — ECONOMIC STABILITY: FOOD INSECURITY: WITHIN THE PAST 12 MONTHS, THE FOOD YOU BOUGHT JUST DIDN'T LAST AND YOU DIDN'T HAVE MONEY TO GET MORE.: OFTEN TRUE

## 2025-02-07 NOTE — PROGRESS NOTES
Denise Johnson (:  1965) is a 59 y.o. female, Established patient, here for evaluation of the following chief complaint(s):  Cough (Follow-up), Wheezing (Follow-up), Sore Throat (Follow-up), Chest congestion follow-up, and Trouble swallowing - takes awhile before food goes down         Assessment & Plan  1. Right wrist pain.  The patient reports worsening pain in the right wrist after receiving a cortisone shot. She is advised to continue follow-up with the hand specialist. For pain management, she can use Tylenol and apply Voltaren gel. Ice application is also recommended.    2. Left shoulder blade pain.  The patient reports persistent pain in the left shoulder blade area. Physical examination reveals generalized tenderness over the left trapezius muscle area, scapular area, and left side paraspinal muscles. She is referred to physical therapy for further evaluation and management. Voltaren gel can be applied to the affected area, and the use of a heating pad is encouraged. Home exercises for the neck and spine have been provided.    3. Dysphagia.  The patient reports trouble swallowing for the past 2 weeks, with no associated acid reflux or chest pain. She is advised to maintain a diet consisting of small and soft foods to prevent choking episodes. A referral to a gastroenterologist has been made for further evaluation. In the interim, she is prescribed Protonix to be taken 30 minutes before meals.    4. Hypertension.  The patient's blood pressure is significantly elevated. She is advised to schedule an appointment with her cardiologist for potential adjustment of her antihypertensive medication. In the meantime, her hydralazine dosage will be increased to 100 mg, to be taken three times daily.    5. Neck pain.  The patient reports neck pain with discomfort radiating to the upper back. Physical examination reveals discomfort around the cervical spine and limited range of motion in the left shoulder.

## 2025-02-07 NOTE — PATIENT INSTRUCTIONS
Insurance Contacts     o [facility]   Phone: [phone]   Website:  [email or web address]    o [employer]   Phone: [phone]  Website: [email or web address]    o [facility]   Phone: [phone]  Website: [email or web address]    o [facility]   Phone: [phone] or [phone]   Website: [email or web address]    o [employer]   Phone: [phone]  Website: [email or web address]

## 2025-02-07 NOTE — PROGRESS NOTES
\"Have you been to the ER, urgent care clinic since your last visit?  Hospitalized since your last visit?\"    NO    “Have you seen or consulted any other health care providers outside our system since your last visit?”    NO    Chief Complaint   Patient presents with    Cough     Follow-up    Wheezing     Follow-up    Sore Throat     Follow-up    Chest congestion follow-up

## 2025-02-13 ENCOUNTER — HOSPITAL ENCOUNTER (OUTPATIENT)
Facility: HOSPITAL | Age: 60
Setting detail: RECURRING SERIES
Discharge: HOME | End: 2025-02-16
Payer: COMMERCIAL

## 2025-02-13 ENCOUNTER — TELEPHONE (OUTPATIENT)
Facility: HOSPITAL | Age: 60
End: 2025-02-13

## 2025-02-13 PROCEDURE — 97163 PT EVAL HIGH COMPLEX 45 MIN: CPT

## 2025-02-13 NOTE — PROGRESS NOTES
PHYSICAL / OCCUPATIONAL THERAPY - DAILY TREATMENT NOTE (updated )  For Eval visit    Patient Name: Denise Johnson    Date: 2025    : 1965  Insurance: [place] [employer] [place] / Plan: [employer] [place] / Product Type: *No Product type* /      Patient  verified yes     Visit #   Current / Total 1 16   Time   In / Out 2:26 3:26   Pain   In / Out 8/10 8/10   Subjective Functional Status/Changes: See POC     TREATMENT AREA =  Cervicalgia [M54.2]  Pain in thoracic spine [M54.6]  Pain in left shoulder [M25.512]    OBJECTIVE    MHP 10 min in long sit CS and LS      45 min   Eval - untimed                      Therapeutic Procedures:  Tx Min Billable or 1:1 Min (if diff from Tx Min) Procedure, Rationale, Specifics   5  19754 Self Care/Home Management (timed):  improve patient knowledge and understanding of home injury/symptom/pain management, positioning, posture/ergonomics, home safety, activity modification, transfer techniques, and joint protection strategies  to improve patient's ability to progress to PLOF and address remaining functional goals.  (see flow sheet as applicable)     Details if applicable:    Reviewed diagnosis, prognosis, therapy progression   Reviewed and educated on HEP           Details if applicable:      []   assessing strength/ROM  []   assessing activity performance (ex: sit to stand, stair negotiation)  []   assessing balance/control (ex. Jurado, DGI, SLS)          Details if applicable:      []   assessing strength/ROM  []   assessing activity performance (ex: sit to stand, stair negotiation)  []   assessing balance/control (ex. Jurado, DGI, SLS)          Details if applicable:      []   assessing strength/ROM  []   assessing activity performance (ex: sit to stand, stair negotiation)  []   assessing balance/control (ex. Jurado, DGI, SLS)          Details if applicable:       5  MC BC Totals Reminder: bill using total billable min of TIMED therapeutic procedures

## 2025-02-13 NOTE — THERAPY EVALUATION
[facility]  [address], [address], [place] [address] Ph:[phone] Fx: [phone]  Plan of Care / Statement of Necessity for Physical Therapy Services     Patient Name: Denise Johnson : 1965   Medical   Diagnosis:  Cervicalgia [M54.2]  Pain in thoracic spine [M54.6]  Pain in left shoulder [M25.512] Treatment Diagnosis: M25.512  LEFT SHOULDER PAIN and M54.2  NECK PAIN and M54.6  THORACIC PAIN   Onset Date: 2 months     Referral Source: Carla St MD Start of Care (SOC): 2025   Prior Hospitalization: See medical history Provider #: [id number]   Prior Level of Function: Normal aches and pains but not as constant as they are now not affecting ADLs significantly.    Comorbidities: Musculoskeletal disorders, Social determinants of health: Tobacco, Financial, Transportation, Food insecurity, Housing stability, and Utilities, and Other: HTN, hx of stroke 2017       Assessment / pulido information:  Denise Johnson is a 59 y.o. female who presents to skilled PT for the treatment diagnosis of neck, midback, and L shoulder pain. Also reports R hand and L shoulder blade pain. \"Everything aches.\" This all started about 2 months ago and the back even longer than that. The back pain came from an injury on . The new onset of pain started without MARTI. Takes naproxen which helps for a short time. The pt states the neck feels too heavy and hard to hold up. When turning the head either way causes sharp pain and some radiating pain in the L arm and shoulder blade. Endorses deep pain underneath the shoulder blade and also deep in the L side of the chest.  Hurts to lift the shoulder and when reaching. If sitting in a certain position, the shoulder can ache. Hurts driving for prolonged durations. Hurting lifting heavier objects. Limited to lifting about 10 pounds with pain in back, neck and shoulder. No longer able to take her dogs for walks due to

## 2025-02-13 NOTE — TELEPHONE ENCOUNTER
Tried calling pt referring provider to discuss today's findings during IE. Did not reach anyone or VM and kept ringing for 8+ minutes.

## 2025-03-03 DIAGNOSIS — R05.9 COUGH IN ADULT: ICD-10-CM

## 2025-03-03 NOTE — TELEPHONE ENCOUNTER
This pharmacy faxed over request for the following prescriptions to be filled:    Medication requested : Tessalon 200 MG   PCP:  Dr. St   Pharmacy or Print: [facility]   Mail order or Local pharmacy [facility]     Scheduled appointment if not seen by current providers in office:  Lov  02/07/2025, F/u 03/20/2025

## 2025-03-07 ENCOUNTER — TELEPHONE (OUTPATIENT)
Facility: CLINIC | Age: 60
End: 2025-03-07

## 2025-03-07 NOTE — TELEPHONE ENCOUNTER
This pharmacy faxed over request for the following prescriptions to be filled:    Medication requested : Tessalon 200 MG   PCP:  Dr St   Pharmacy or Print:  [facility]   Mail order or Local pharmacy [facility]     Scheduled appointment if not seen by current providers in office:  F/u 03/20/2025, Lov 02/07/2025

## 2025-03-12 RX ORDER — BENZONATATE 200 MG/1
200 CAPSULE ORAL
Qty: 10 CAPSULE | Refills: 0 | Status: SHIPPED | OUTPATIENT
Start: 2025-03-12

## 2025-03-12 NOTE — TELEPHONE ENCOUNTER
Patient identified with 2 identifiers (name and ).   Spoke with patient and she states she has a cold, head congestion and cough  No fever  Cough is mostly at night. Difficulty sleeping.

## 2025-03-17 ENCOUNTER — TELEPHONE (OUTPATIENT)
Facility: HOSPITAL | Age: 60
End: 2025-03-17

## 2025-03-17 NOTE — TELEPHONE ENCOUNTER
Called pt d/t no scheduled appts and 1 month f/u. States she has an appt booked w/ her cardiologist this week. Pt wcb after to sched if interested in PT

## 2025-03-19 ENCOUNTER — HOSPITAL ENCOUNTER (OUTPATIENT)
Facility: HOSPITAL | Age: 60
Discharge: HOME | End: 2025-03-22
Payer: COMMERCIAL

## 2025-03-19 ENCOUNTER — RESULTS FOLLOW-UP (OUTPATIENT)
Facility: HOSPITAL | Age: 60
End: 2025-03-19

## 2025-03-19 DIAGNOSIS — M25.531 RIGHT WRIST PAIN: ICD-10-CM

## 2025-03-19 DIAGNOSIS — R06.2 WHEEZING: ICD-10-CM

## 2025-03-19 DIAGNOSIS — F41.9 ANXIETY: ICD-10-CM

## 2025-03-19 DIAGNOSIS — R73.01 IMPAIRED FASTING BLOOD SUGAR: ICD-10-CM

## 2025-03-19 DIAGNOSIS — R23.2 HOT FLASHES: ICD-10-CM

## 2025-03-19 DIAGNOSIS — R05.1 ACUTE COUGH: ICD-10-CM

## 2025-03-19 DIAGNOSIS — Z13.220 SCREENING FOR HYPERLIPIDEMIA: ICD-10-CM

## 2025-03-19 DIAGNOSIS — I99.8 POORLY CONTROLLED BLOOD PRESSURE: ICD-10-CM

## 2025-03-19 LAB
ALBUMIN SERPL-MCNC: 3.7 G/DL (ref 3.4–5)
ALBUMIN/GLOB SERPL: 1.1 (ref 0.8–1.7)
ALP SERPL-CCNC: 121 U/L (ref 45–117)
ALT SERPL-CCNC: 22 U/L (ref 13–56)
ANION GAP SERPL CALC-SCNC: 4 MMOL/L (ref 3–18)
AST SERPL-CCNC: 14 U/L (ref 10–38)
BILIRUB SERPL-MCNC: 0.4 MG/DL (ref 0.2–1)
BUN SERPL-MCNC: 14 MG/DL (ref 7–18)
BUN/CREAT SERPL: 19 (ref 12–20)
CALCIUM SERPL-MCNC: 9.3 MG/DL (ref 8.5–10.1)
CHLORIDE SERPL-SCNC: 106 MMOL/L (ref 100–111)
CHOLEST SERPL-MCNC: 173 MG/DL
CO2 SERPL-SCNC: 29 MMOL/L (ref 21–32)
CREAT SERPL-MCNC: 0.75 MG/DL (ref 0.6–1.3)
ERYTHROCYTE [DISTWIDTH] IN BLOOD BY AUTOMATED COUNT: 12.5 % (ref 11.6–14.5)
EST. AVERAGE GLUCOSE BLD GHB EST-MCNC: 137 MG/DL
GLOBULIN SER CALC-MCNC: 3.5 G/DL (ref 2–4)
GLUCOSE SERPL-MCNC: 106 MG/DL (ref 74–99)
HBA1C MFR BLD: 6.4 % (ref 4.2–5.6)
HCT VFR BLD AUTO: 36.7 % (ref 35–45)
HDLC SERPL-MCNC: 60 MG/DL (ref 40–60)
HDLC SERPL: 2.9 (ref 0–5)
HGB BLD-MCNC: 11.4 G/DL (ref 12–16)
LDLC SERPL CALC-MCNC: 98.6 MG/DL (ref 0–100)
LIPID PANEL: NORMAL
MCH RBC QN AUTO: 27.4 PG (ref 24–34)
MCHC RBC AUTO-ENTMCNC: 31.1 G/DL (ref 31–37)
MCV RBC AUTO: 88.2 FL (ref 78–100)
NRBC # BLD: 0 K/UL (ref 0–0.01)
NRBC BLD-RTO: 0 PER 100 WBC
PLATELET # BLD AUTO: 358 K/UL (ref 135–420)
PMV BLD AUTO: 10.9 FL (ref 9.2–11.8)
POTASSIUM SERPL-SCNC: 4.1 MMOL/L (ref 3.5–5.5)
PROT SERPL-MCNC: 7.2 G/DL (ref 6.4–8.2)
RBC # BLD AUTO: 4.16 M/UL (ref 4.2–5.3)
SODIUM SERPL-SCNC: 139 MMOL/L (ref 136–145)
TRIGL SERPL-MCNC: 72 MG/DL
TSH SERPL DL<=0.05 MIU/L-ACNC: 2.34 UIU/ML (ref 0.36–3.74)
VLDLC SERPL CALC-MCNC: 14.4 MG/DL
WBC # BLD AUTO: 4.4 K/UL (ref 4.6–13.2)

## 2025-03-19 PROCEDURE — 84443 ASSAY THYROID STIM HORMONE: CPT

## 2025-03-19 PROCEDURE — 71046 X-RAY EXAM CHEST 2 VIEWS: CPT

## 2025-03-19 PROCEDURE — 83036 HEMOGLOBIN GLYCOSYLATED A1C: CPT

## 2025-03-19 PROCEDURE — 36415 COLL VENOUS BLD VENIPUNCTURE: CPT

## 2025-03-19 PROCEDURE — 80053 COMPREHEN METABOLIC PANEL: CPT

## 2025-03-19 PROCEDURE — 85027 COMPLETE CBC AUTOMATED: CPT

## 2025-03-19 PROCEDURE — 80061 LIPID PANEL: CPT

## 2025-03-20 ENCOUNTER — OFFICE VISIT (OUTPATIENT)
Facility: CLINIC | Age: 60
End: 2025-03-20
Payer: COMMERCIAL

## 2025-03-20 VITALS
SYSTOLIC BLOOD PRESSURE: 138 MMHG | HEIGHT: 63 IN | WEIGHT: 130.2 LBS | BODY MASS INDEX: 23.07 KG/M2 | RESPIRATION RATE: 16 BRPM | OXYGEN SATURATION: 98 % | HEART RATE: 55 BPM | TEMPERATURE: 97.3 F | DIASTOLIC BLOOD PRESSURE: 96 MMHG

## 2025-03-20 DIAGNOSIS — R13.10 DYSPHAGIA, UNSPECIFIED TYPE: ICD-10-CM

## 2025-03-20 DIAGNOSIS — J04.0 LARYNGITIS: Primary | ICD-10-CM

## 2025-03-20 DIAGNOSIS — R03.0 ELEVATED BLOOD PRESSURE READING: ICD-10-CM

## 2025-03-20 DIAGNOSIS — R73.03 BORDERLINE DIABETES: ICD-10-CM

## 2025-03-20 DIAGNOSIS — R07.89 CHEST TIGHTNESS: ICD-10-CM

## 2025-03-20 DIAGNOSIS — R09.89 RUNNY NOSE: ICD-10-CM

## 2025-03-20 DIAGNOSIS — R09.82 POST-NASAL DRIP: ICD-10-CM

## 2025-03-20 LAB
GROUP A STREP ANTIGEN, POC: NEGATIVE
VALID INTERNAL CONTROL, POC: YES

## 2025-03-20 PROCEDURE — 3075F SYST BP GE 130 - 139MM HG: CPT | Performed by: FAMILY MEDICINE

## 2025-03-20 PROCEDURE — 3080F DIAST BP >= 90 MM HG: CPT | Performed by: FAMILY MEDICINE

## 2025-03-20 PROCEDURE — 87880 STREP A ASSAY W/OPTIC: CPT | Performed by: FAMILY MEDICINE

## 2025-03-20 PROCEDURE — 99214 OFFICE O/P EST MOD 30 MIN: CPT | Performed by: FAMILY MEDICINE

## 2025-03-20 RX ORDER — METFORMIN HYDROCHLORIDE 500 MG/1
500 TABLET, EXTENDED RELEASE ORAL
Qty: 90 TABLET | Refills: 1 | Status: SHIPPED | OUTPATIENT
Start: 2025-03-20

## 2025-03-20 RX ORDER — PREDNISONE 10 MG/1
10 TABLET ORAL DAILY
Qty: 7 TABLET | Refills: 0 | Status: SHIPPED | OUTPATIENT
Start: 2025-03-20 | End: 2025-03-27

## 2025-03-20 NOTE — PROGRESS NOTES
\"Have you been to the ER, urgent care clinic since your last visit?  Hospitalized since your last visit?\"    NO    “Have you seen or consulted any other health care providers outside our system since your last visit?”    NO     “Have you had a pap smear?”    NO    Date of last Cervical Cancer screen (HPV or PAP): 2/21/2024     Chief Complaint   Patient presents with    Shoulder Pain    Wrist Pain    Neck Pain    Dysphagia    Poorly controlled blood pressure    Blood Sugar Problem    Anxiety    Eye Pain     Right eye pain - pain level 4                
that Artificial Intelligence will be utilized during this visit to record, process the conversation to generate a clinical note and to support improvement of the AI technology. The patient (or guardian, if applicable) and other individuals in attendance at the appointment consented to the use of AI, including the recording.      An electronic signature was used to authenticate this note.    --Carla St MD

## 2025-03-24 ENCOUNTER — TELEPHONE (OUTPATIENT)
Age: 60
End: 2025-03-24

## 2025-03-24 NOTE — TELEPHONE ENCOUNTER
Referral for Dysphagia. Please review and advise.    Referral  Referral # [id number]  Referral Information    Referral # Creation Date Referral Status Status Update    [id number] 03/20/2025 Open 03/20/2025: Status History     Status Reason Referral Type Referral Reasons Referral Class   none Eval and Treat Specialty Services Required Internal     To Specialty To Provider To Location/Place of Service To Department   Gastroenterology Jose Raul Langford MD none [facility] - GASTROENTEROLOGY     To Vendor Referred By By Location/Place of Service By Department   none Carla St MD [facility] PRIMARY HR  [facility]     Priority Start Date Expiration Date Referral Entered By   Routine 03/20/2025 03/20/2026 Carla St MD     Visits Requested Visits Authorized Visits Completed Visits Scheduled   1 1       Coverages    Payer Plan Auth. Required? Covered? Member # Authorized From Expires Auth # Precert. # Comment   [employer] [id number] 1/1/2020 -- -- -- --     Referral Information    Referral # Creation Date Referral Status Status Update    [id number] 03/20/2025 Open 03/20/2025: Status History     Status Reason Referral Type Referral Reasons Referral Class   none Eval and Treat Specialty Services Required Internal     To Specialty To Provider To Location/Place of Service To Department   Gastroenterology Jose Raul Langford MD none HR [facility] - GASTROENTEROLOGY     To Vendor Referred By By Location/Place of Service By Department   none Carla St MD [facility] PRIMARY [facility]     Priority Start Date Expiration Date Referral Entered By   Routine 03/20/2025 03/20/2026 Carla St MD     Visits Requested Visits Authorized Visits Completed Visits Scheduled   1 1       Procedure Information    Service Details  Procedure Modifiers Provider Requested Approved   REF25 - AMB REFERRAL TO

## 2025-04-02 ENCOUNTER — TELEPHONE (OUTPATIENT)
Facility: HOSPITAL | Age: 60
End: 2025-04-02

## 2025-04-02 NOTE — TELEPHONE ENCOUNTER
LVM for pt to cb and sched an appt & to see if pt has f/u w/ cardiologist. Pt can sched until 4/13/25, if pt has not been seen by cardiologist we will need to dc and start again w/ a new referral

## 2025-04-10 ENCOUNTER — OFFICE VISIT (OUTPATIENT)
Age: 60
End: 2025-04-10

## 2025-04-10 VITALS
HEART RATE: 68 BPM | WEIGHT: 135 LBS | DIASTOLIC BLOOD PRESSURE: 110 MMHG | OXYGEN SATURATION: 97 % | BODY MASS INDEX: 23.92 KG/M2 | SYSTOLIC BLOOD PRESSURE: 160 MMHG | HEIGHT: 63 IN

## 2025-04-10 DIAGNOSIS — I10 ESSENTIAL HYPERTENSION: Primary | ICD-10-CM

## 2025-04-10 DIAGNOSIS — R07.9 CHEST PAIN, UNSPECIFIED TYPE: ICD-10-CM

## 2025-04-10 RX ORDER — SPIRONOLACTONE 25 MG/1
25 TABLET ORAL DAILY
Qty: 30 TABLET | Refills: 3 | Status: SHIPPED | OUTPATIENT
Start: 2025-04-10

## 2025-04-10 NOTE — PROGRESS NOTES
[facility]    Denise Johnson is 59 y.o. female     Patient is here today for cardiac evaluation  Denies prior history of MI or CHF.    Pt established care with Dr. Canas 01/2025 for evaluation of chest pain; she started experiencing chest discomfort for 3-4 months prior.  She says that the pain is in left scapula area and also left lower chest; she feels like she has to sit forward when the pain comes, as it feels \"like a muscle having a spasm.\"  She says that she can have episodes of pain for a couple days in a row, or sometimes several days between episodes.  She says that the pain seconds to 5 minutes and that she has not taken any SL NTG.  No associated shortness of breath, dizziness or diaphoresis.  No PND/orthopnea or leg swelling.      Past Medical History:   Diagnosis Date    Abnormal mammogram 3/2016    BIRADS 3 right breast    Hypertension     Menopause     Stroke (HCC) 8/1/2016    CVA       Review of Systems:  Cardiac symptoms as noted above in HPI. All others negative.  Denies fatigue, malaise, skin rash, joint pain, blurring vision, photophobia, neck pain, hemoptysis, chronic cough, nausea, vomiting, hematuria, burning micturition, BRBPR, chronic headaches.    Current Outpatient Medications   Medication Sig    metFORMIN (GLUCOPHAGE-XR) 500 MG extended release tablet Take 1 tablet by mouth daily (with breakfast)    benzonatate (TESSALON) 200 MG capsule Take 1 capsule by mouth nightly as needed for Cough    pantoprazole (PROTONIX) 20 MG tablet Take 1 tablet by mouth every morning (before breakfast)    diclofenac sodium (VOLTAREN) 1 % GEL Apply 4 g topically 4 times daily    hydrALAZINE (APRESOLINE) 100 MG tablet Take 1 tablet by mouth 3 times daily    aspirin 81 MG EC tablet Take 1 tablet by mouth daily    nitroGLYCERIN (NITROSTAT) 0.4 MG SL tablet Place 1 tablet under the tongue every 5 minutes as needed for Chest pain up to max of 3 total doses. If no relief after 1

## 2025-04-17 ENCOUNTER — SCHEDULED TELEPHONE ENCOUNTER (OUTPATIENT)
Age: 60
End: 2025-04-17

## 2025-04-17 DIAGNOSIS — R13.10 DYSPHAGIA, UNSPECIFIED TYPE: Primary | ICD-10-CM

## 2025-04-18 ENCOUNTER — TELEPHONE (OUTPATIENT)
Age: 60
End: 2025-04-18

## 2025-04-18 PROBLEM — R13.10 DYSPHAGIA, UNSPECIFIED: Status: ACTIVE | Noted: 2025-04-17

## 2025-04-18 NOTE — PROGRESS NOTES
Patient scheduled for an EGD on 4/22/2025  Instructions emailed and sent through [employer]  Surgical Registration Form scanned.  Case Request Opened

## 2025-04-21 ENCOUNTER — OFFICE VISIT (OUTPATIENT)
Facility: CLINIC | Age: 60
End: 2025-04-21
Payer: COMMERCIAL

## 2025-04-21 VITALS
SYSTOLIC BLOOD PRESSURE: 138 MMHG | RESPIRATION RATE: 16 BRPM | TEMPERATURE: 97 F | WEIGHT: 135 LBS | DIASTOLIC BLOOD PRESSURE: 78 MMHG | BODY MASS INDEX: 23.92 KG/M2 | OXYGEN SATURATION: 98 % | HEART RATE: 73 BPM | HEIGHT: 63 IN

## 2025-04-21 DIAGNOSIS — L81.6 SKIN HYPOPIGMENTATION: Primary | ICD-10-CM

## 2025-04-21 PROCEDURE — 3075F SYST BP GE 130 - 139MM HG: CPT | Performed by: FAMILY MEDICINE

## 2025-04-21 PROCEDURE — 3078F DIAST BP <80 MM HG: CPT | Performed by: FAMILY MEDICINE

## 2025-04-21 PROCEDURE — 99213 OFFICE O/P EST LOW 20 MIN: CPT | Performed by: FAMILY MEDICINE

## 2025-04-21 NOTE — PROGRESS NOTES
Denise Johnson (:  1965) is a 59 y.o. female, Established patient, here for evaluation of the following chief complaint(s):  Skin Problem (Skin pigmentation issue on both wrists x 1 week. Patient states brown skin has turned white and seems to be spreading. Areas are sensitive to touch.)         Assessment & Plan  1. Hyperpigmentation on the wrist.  - Reports noticing skin hyperpigmentation over both wrists on the palmar surface for about a week.  - No redness, swelling, or tenderness observed.  - Discussed the condition and the possibility of alginism; referral to dermatology will be initiated for further evaluation and diagnosis.  - Referral to dermatology for further assessment and management.    2. Health maintenance.  - Last Pap smear in  showed atypical cells but was HPV negative.  - Seen by a gynecologist who repeated the test and reported it as normal.  - Recommended to continue annual Pap smears; status of her Pap smear will be checked to ensure it is up to date.  - Will follow up to confirm the Pap smear status and ensure it is current.    Results  Labs   - Pap smear: , Atypical cells, considered as an abnormal Pap smear  1. Skin hypopigmentation  -     External Referral To Dermatology    No follow-ups on file.       Subjective   History of Present Illness  The patient presents for evaluation of skin pigmentation changes on her wrist.    She reports a recent alteration in the pigmentation of her left wrist, characterized by the disappearance of previously existing brown spots. This change has been observed for approximately one week. The hypopigmentation is beginning to extend downwards. No associated itching or pain is noted, but there is a sensation of tenderness near the vein on the left wrist. No similar changes have been observed elsewhere on the body.    Her last Pap smear was conducted in 2024, following which she consulted a gynecologist who performed a repeat test. The

## 2025-04-21 NOTE — PROGRESS NOTES
\"Have you been to the ER, urgent care clinic since your last visit?  Hospitalized since your last visit?\"    NO    “Have you seen or consulted any other health care providers outside our system since your last visit?”    NO     “Have you had a pap smear?”    NO    Date of last Cervical Cancer screen (HPV or PAP): 2/21/2024              Chief Complaint   Patient presents with    Skin Problem     Skin pigmentation issue on both wrists x 1 week. Patient states brown skin has turned white and seems to be spreading. Areas are sensitive to touch.

## 2025-05-27 ENCOUNTER — TELEPHONE (OUTPATIENT)
Age: 60
End: 2025-05-27

## 2025-07-01 ENCOUNTER — OFFICE VISIT (OUTPATIENT)
Facility: CLINIC | Age: 60
End: 2025-07-01
Payer: COMMERCIAL

## 2025-07-01 VITALS
OXYGEN SATURATION: 99 % | BODY MASS INDEX: 24.24 KG/M2 | DIASTOLIC BLOOD PRESSURE: 72 MMHG | WEIGHT: 136.8 LBS | RESPIRATION RATE: 16 BRPM | SYSTOLIC BLOOD PRESSURE: 128 MMHG | HEIGHT: 63 IN | HEART RATE: 79 BPM | TEMPERATURE: 97.1 F

## 2025-07-01 DIAGNOSIS — F41.9 ANXIETY: ICD-10-CM

## 2025-07-01 DIAGNOSIS — Z11.59 NEED FOR HEPATITIS C SCREENING TEST: ICD-10-CM

## 2025-07-01 DIAGNOSIS — I10 ESSENTIAL HYPERTENSION WITH GOAL BLOOD PRESSURE LESS THAN 140/90: Primary | ICD-10-CM

## 2025-07-01 DIAGNOSIS — D72.819 LEUKOPENIA, UNSPECIFIED TYPE: ICD-10-CM

## 2025-07-01 DIAGNOSIS — R13.10 DYSPHAGIA, UNSPECIFIED TYPE: ICD-10-CM

## 2025-07-01 DIAGNOSIS — J04.0 LARYNGITIS: ICD-10-CM

## 2025-07-01 DIAGNOSIS — R73.01 IMPAIRED FASTING BLOOD SUGAR: ICD-10-CM

## 2025-07-01 DIAGNOSIS — M47.817 LUMBOSACRAL SPONDYLOSIS WITHOUT MYELOPATHY: ICD-10-CM

## 2025-07-01 DIAGNOSIS — Z11.4 SCREENING FOR HIV (HUMAN IMMUNODEFICIENCY VIRUS): ICD-10-CM

## 2025-07-01 DIAGNOSIS — R79.89 ABNORMAL CBC: ICD-10-CM

## 2025-07-01 DIAGNOSIS — E55.9 VITAMIN D DEFICIENCY: ICD-10-CM

## 2025-07-01 DIAGNOSIS — R87.610 ATYPICAL SQUAMOUS CELLS OF UNDETERMINED SIGNIFICANCE ON CYTOLOGIC SMEAR OF CERVIX (ASC-US): ICD-10-CM

## 2025-07-01 PROCEDURE — 3078F DIAST BP <80 MM HG: CPT | Performed by: FAMILY MEDICINE

## 2025-07-01 PROCEDURE — 99214 OFFICE O/P EST MOD 30 MIN: CPT | Performed by: FAMILY MEDICINE

## 2025-07-01 PROCEDURE — 3074F SYST BP LT 130 MM HG: CPT | Performed by: FAMILY MEDICINE

## 2025-07-01 NOTE — PROGRESS NOTES
Denise Johnson (:  1965) is a 59 y.o. female, Established patient, here for evaluation of the following chief complaint(s):  Dysphagia, Elevated blood pressure, Hand Pain (Left hand pain and swelling - caught hand in door at work this morning), and Other (Borderline diabetes)         Assessment & Plan  1. Hand injury.  - Reports smashing her hand on a door at work this morning, resulting in pain.  - No swelling observed.  - Ice application recommended to help alleviate the pain.  - If symptoms persist or worsen, further evaluation may be necessary.    2. Dysphagia.  - Dysphagia has improved following the resolution of laryngitis and sore throat.  - No choking episodes reported.  - Continued monitoring advised.  - No further gastroenterology referral needed at this time.    3. Hypertension.  - Blood pressure is well-controlled with current regimen of spironolactone, nifedipine, lisinopril, labetalol, hydralazine, and aspirin.  - Advised to maintain low-salt diet and healthy lifestyle.  - No recent need for nitroglycerin tablets for chest pain.  - Continues follow-up with cardiologist.    4. Prediabetes.  - On metformin for prediabetes, taken every morning with breakfast.  - Reports occasional wooziness after taking medication.  - Advised to continue taking metformin with meals and not to skip meals.  - Diabetic diet recommended; A1c will be repeated before next visit.    5. Back pain.  - Reports persistent back pain radiating to hips and legs, especially noticeable when removing brace at night.  - Referral to spine specialist and physical therapy made.  - Advised to use heating pad for pain relief.    6. Anemia.  - History of low white cell count and anemia.  - Follow-up appointment with hematologist recommended.  - No new referral needed; advised to make an appointment.    7. Anxiety.  - Anxiety is situational and related to current living situation.  - No changes in treatment necessary at this

## 2025-07-01 NOTE — PROGRESS NOTES
Have you been to the ER, urgent care clinic since your last visit?  Hospitalized since your last visit?   NO    Have you seen or consulted any other health care providers outside our system since your last visit?   NO     “Have you had a pap smear?”    NO    Date of last Cervical Cancer screen (HPV or PAP): 2/21/2024     Chief Complaint   Patient presents with    Dysphagia    Elevated blood pressure    Hand Pain     Left hand pain and swelling - caught hand in door at work this morning    Other     Borderline diabetes

## 2025-07-01 NOTE — PATIENT INSTRUCTIONS
Please contact the following office to schedule an appointment:    [facility]  Phone: [phone]

## 2025-07-18 ENCOUNTER — TELEPHONE (OUTPATIENT)
Facility: CLINIC | Age: 60
End: 2025-07-18

## 2025-07-18 NOTE — TELEPHONE ENCOUNTER
Patient broke their arm 7\12\25. [facility] gave her a work note for the weekend. For long term absence they are telling her she needs a work note from us. Patient has been out of work for a week and has not saw an orthopedic surgeon yet Patient states they need a note today. Advised patient Dr. St is not in the office today but would send high priority to nurse.

## 2025-07-24 ENCOUNTER — OFFICE VISIT (OUTPATIENT)
Age: 60
End: 2025-07-24

## 2025-07-24 VITALS — WEIGHT: 131 LBS | BODY MASS INDEX: 22.36 KG/M2 | HEIGHT: 64 IN

## 2025-07-24 DIAGNOSIS — G56.01 RIGHT CARPAL TUNNEL SYNDROME: ICD-10-CM

## 2025-07-24 DIAGNOSIS — S52.571A OTHER CLOSED INTRA-ARTICULAR FRACTURE OF DISTAL END OF RIGHT RADIUS, INITIAL ENCOUNTER: Primary | ICD-10-CM

## 2025-07-24 RX ORDER — HYDROCODONE BITARTRATE AND ACETAMINOPHEN 5; 325 MG/1; MG/1
1 TABLET ORAL EVERY 6 HOURS PRN
Qty: 20 TABLET | Refills: 0 | Status: SHIPPED | OUTPATIENT
Start: 2025-07-24 | End: 2025-07-29

## 2025-07-24 RX ORDER — DICLOFENAC SODIUM 75 MG/1
75 TABLET, DELAYED RELEASE ORAL 2 TIMES DAILY
Qty: 28 TABLET | Refills: 0 | Status: SHIPPED | OUTPATIENT
Start: 2025-07-24 | End: 2025-08-07

## 2025-07-24 NOTE — PROGRESS NOTES
4 times daily (Patient not taking: Reported on 7/24/2025) 100 g 0     No current facility-administered medications for this visit.       No Known Allergies      Ht 1.626 m (5' 4\")   Wt 59.4 kg (131 lb)   BMI 22.49 kg/m²   Physical Exam  Vitals and nursing note reviewed.   Constitutional:       General: She is not in acute distress.     Appearance: Normal appearance. She is not ill-appearing.   Cardiovascular:      Pulses: Normal pulses.   Pulmonary:      Effort: Pulmonary effort is normal. No respiratory distress.   Musculoskeletal:         General: Swelling and tenderness present. No deformity or signs of injury.      Cervical back: Normal range of motion and neck supple.      Right lower leg: No edema.      Left lower leg: No edema.   Skin:     General: Skin is warm and dry.      Capillary Refill: Capillary refill takes less than 2 seconds.      Findings: Bruising present. No erythema.   Neurological:      General: No focal deficit present.      Mental Status: She is alert and oriented to person, place, and time.   Psychiatric:         Mood and Affect: Mood normal.         Behavior: Behavior normal.        Right wrist: There is edema, ecchymosis, tenderness to palpation noted globally about the wrist.  No obvious dinner fork deformity.  Range of motion: Not formally tested given acute injury.  Diminished sensation about digits 1 through 4 though intact.    NEUROVASCULAR    Examination R Examination R   Carpal Comp. + Pronator Comp. -   Carpal Tinel + Pronator Tinel -   Phalen's - Pronator Stress -   Cubital Comp. - Guyon Comp. -   Cubital Tinel - Guyon Tinel -   Elbow Hyperflexion - Adson's -   Spurling's - SC Comp. -   PCB Median abn - SC Tinel -   Radial Tinel - IC Comp. -   Digital Tinel - IC Tinel -   Radial 2-Pt WNL Ulnar 2-Pt WNL     Radial Pulse: 2+  Capillary Refill: < 2 sec  Rashad: Not Performed  Digital Rashad: Not Performed        Imaging / Diagnostics:     7/24/2025 3 views of the right wrist

## 2025-08-07 ENCOUNTER — TELEPHONE (OUTPATIENT)
Age: 60
End: 2025-08-07

## 2025-08-07 ENCOUNTER — CLINICAL DOCUMENTATION (OUTPATIENT)
Age: 60
End: 2025-08-07

## 2025-08-07 ENCOUNTER — OFFICE VISIT (OUTPATIENT)
Age: 60
End: 2025-08-07

## 2025-08-07 VITALS
HEIGHT: 64 IN | SYSTOLIC BLOOD PRESSURE: 129 MMHG | WEIGHT: 136 LBS | DIASTOLIC BLOOD PRESSURE: 72 MMHG | BODY MASS INDEX: 23.22 KG/M2

## 2025-08-07 DIAGNOSIS — G56.01 RIGHT CARPAL TUNNEL SYNDROME: ICD-10-CM

## 2025-08-07 DIAGNOSIS — R73.01 IMPAIRED FASTING BLOOD SUGAR: ICD-10-CM

## 2025-08-07 DIAGNOSIS — Z01.818 PREOP EXAMINATION: Primary | ICD-10-CM

## 2025-08-07 DIAGNOSIS — S52.571D OTHER CLOSED INTRA-ARTICULAR FRACTURE OF DISTAL END OF RIGHT RADIUS WITH ROUTINE HEALING, SUBSEQUENT ENCOUNTER: ICD-10-CM

## 2025-08-07 DIAGNOSIS — S52.571D OTHER CLOSED INTRA-ARTICULAR FRACTURE OF DISTAL END OF RIGHT RADIUS WITH ROUTINE HEALING, SUBSEQUENT ENCOUNTER: Primary | ICD-10-CM

## 2025-08-07 PROBLEM — S52.501D CLOSED FRACTURE OF LOWER END OF RIGHT RADIUS WITH ROUTINE HEALING: Status: ACTIVE | Noted: 2025-08-07

## 2025-08-07 RX ORDER — HYDROCODONE BITARTRATE AND ACETAMINOPHEN 5; 325 MG/1; MG/1
1 TABLET ORAL EVERY 6 HOURS PRN
Qty: 20 TABLET | Refills: 0 | Status: SHIPPED | OUTPATIENT
Start: 2025-08-07 | End: 2025-08-12

## 2025-08-08 ENCOUNTER — TELEPHONE (OUTPATIENT)
Age: 60
End: 2025-08-08

## 2025-08-11 ENCOUNTER — HOSPITAL ENCOUNTER (OUTPATIENT)
Age: 60
Discharge: HOME | End: 2025-08-11
Payer: COMMERCIAL

## 2025-08-11 DIAGNOSIS — Z01.818 PREOP EXAMINATION: ICD-10-CM

## 2025-08-11 DIAGNOSIS — G56.01 RIGHT CARPAL TUNNEL SYNDROME: ICD-10-CM

## 2025-08-11 DIAGNOSIS — R73.01 IMPAIRED FASTING BLOOD SUGAR: ICD-10-CM

## 2025-08-11 DIAGNOSIS — S52.571D OTHER CLOSED INTRA-ARTICULAR FRACTURE OF DISTAL END OF RIGHT RADIUS WITH ROUTINE HEALING, SUBSEQUENT ENCOUNTER: ICD-10-CM

## 2025-08-11 LAB
ALBUMIN SERPL-MCNC: 4.1 G/DL (ref 3.4–5)
ALBUMIN/GLOB SERPL: 1.3 (ref 0.8–1.7)
ALP SERPL-CCNC: 112 U/L (ref 45–117)
ALT SERPL-CCNC: 30 U/L (ref 10–35)
ANION GAP SERPL CALC-SCNC: 11 MMOL/L (ref 3–18)
AST SERPL-CCNC: 21 U/L (ref 10–38)
BASOPHILS # BLD: 0.04 K/UL (ref 0–0.1)
BASOPHILS NFR BLD: 1.1 % (ref 0–2)
BILIRUB SERPL-MCNC: 0.3 MG/DL (ref 0.2–1)
BUN SERPL-MCNC: 19 MG/DL (ref 6–23)
BUN/CREAT SERPL: 25 (ref 12–20)
CALCIUM SERPL-MCNC: 10.2 MG/DL (ref 8.5–10.1)
CHLORIDE SERPL-SCNC: 103 MMOL/L (ref 98–107)
CO2 SERPL-SCNC: 28 MMOL/L (ref 21–32)
CREAT SERPL-MCNC: 0.74 MG/DL (ref 0.6–1.3)
DIFFERENTIAL METHOD BLD: ABNORMAL
EOSINOPHIL # BLD: 0.13 K/UL (ref 0–0.4)
EOSINOPHIL NFR BLD: 3.5 % (ref 0–5)
ERYTHROCYTE [DISTWIDTH] IN BLOOD BY AUTOMATED COUNT: 12.9 % (ref 11.6–14.5)
EST. AVERAGE GLUCOSE BLD GHB EST-MCNC: 126 MG/DL
GLOBULIN SER CALC-MCNC: 3.2 G/DL (ref 2–4)
GLUCOSE SERPL-MCNC: 81 MG/DL (ref 74–108)
HBA1C MFR BLD: 6 % (ref 4.2–5.6)
HCT VFR BLD AUTO: 39.9 % (ref 35–45)
HGB BLD-MCNC: 12.2 G/DL (ref 12–16)
IMM GRANULOCYTES # BLD AUTO: 0.01 K/UL (ref 0–0.04)
IMM GRANULOCYTES NFR BLD AUTO: 0.3 % (ref 0–0.5)
LYMPHOCYTES # BLD: 1.44 K/UL (ref 0.9–3.6)
LYMPHOCYTES NFR BLD: 38.9 % (ref 21–52)
MCH RBC QN AUTO: 26.2 PG (ref 24–34)
MCHC RBC AUTO-ENTMCNC: 30.6 G/DL (ref 31–37)
MCV RBC AUTO: 85.6 FL (ref 78–100)
MONOCYTES # BLD: 0.35 K/UL (ref 0.05–1.2)
MONOCYTES NFR BLD: 9.5 % (ref 3–10)
NEUTS SEG # BLD: 1.73 K/UL (ref 1.8–8)
NEUTS SEG NFR BLD: 46.7 % (ref 40–73)
NRBC # BLD: 0 K/UL (ref 0–0.01)
NRBC BLD-RTO: 0 PER 100 WBC
PLATELET # BLD AUTO: 308 K/UL (ref 135–420)
PMV BLD AUTO: 11.5 FL (ref 9.2–11.8)
POTASSIUM SERPL-SCNC: 3.5 MMOL/L (ref 3.5–5.5)
PROT SERPL-MCNC: 7.2 G/DL (ref 6.4–8.2)
RBC # BLD AUTO: 4.66 M/UL (ref 4.2–5.3)
SODIUM SERPL-SCNC: 142 MMOL/L (ref 136–145)
WBC # BLD AUTO: 3.7 K/UL (ref 4.6–13.2)

## 2025-08-11 PROCEDURE — 36415 COLL VENOUS BLD VENIPUNCTURE: CPT

## 2025-08-11 PROCEDURE — 85025 COMPLETE CBC W/AUTO DIFF WBC: CPT

## 2025-08-11 PROCEDURE — 83036 HEMOGLOBIN GLYCOSYLATED A1C: CPT

## 2025-08-11 PROCEDURE — 80053 COMPREHEN METABOLIC PANEL: CPT

## 2025-08-13 ENCOUNTER — CLINICAL DOCUMENTATION (OUTPATIENT)
Age: 60
End: 2025-08-13

## 2025-08-27 ENCOUNTER — TELEPHONE (OUTPATIENT)
Age: 60
End: 2025-08-27

## 2025-08-27 ENCOUNTER — OFFICE VISIT (OUTPATIENT)
Age: 60
End: 2025-08-27

## 2025-08-27 VITALS
BODY MASS INDEX: 22.2 KG/M2 | HEIGHT: 64 IN | WEIGHT: 130 LBS | OXYGEN SATURATION: 98 % | TEMPERATURE: 98 F | HEART RATE: 50 BPM

## 2025-08-27 DIAGNOSIS — M47.817 LUMBOSACRAL SPONDYLOSIS WITHOUT MYELOPATHY: ICD-10-CM

## 2025-08-27 DIAGNOSIS — M54.50 LUMBAR PAIN: Primary | ICD-10-CM

## 2025-08-27 DIAGNOSIS — M41.9 SCOLIOSIS, UNSPECIFIED SCOLIOSIS TYPE, UNSPECIFIED SPINAL REGION: ICD-10-CM

## 2025-08-27 DIAGNOSIS — M54.16 LUMBAR NEURITIS: ICD-10-CM

## 2025-08-27 RX ORDER — GABAPENTIN 300 MG/1
300 CAPSULE ORAL 3 TIMES DAILY
Qty: 90 CAPSULE | Refills: 4 | Status: SHIPPED | OUTPATIENT
Start: 2025-08-27 | End: 2026-02-23

## 2025-08-29 ENCOUNTER — TELEPHONE (OUTPATIENT)
Age: 60
End: 2025-08-29

## 2025-09-04 ENCOUNTER — OFFICE VISIT (OUTPATIENT)
Age: 60
End: 2025-09-04
Payer: COMMERCIAL

## 2025-09-04 VITALS
OXYGEN SATURATION: 98 % | DIASTOLIC BLOOD PRESSURE: 106 MMHG | HEIGHT: 64 IN | BODY MASS INDEX: 22.2 KG/M2 | HEART RATE: 60 BPM | SYSTOLIC BLOOD PRESSURE: 195 MMHG | WEIGHT: 130 LBS

## 2025-09-04 DIAGNOSIS — I10 ESSENTIAL HYPERTENSION: Primary | ICD-10-CM

## 2025-09-04 DIAGNOSIS — R07.9 CHEST PAIN, UNSPECIFIED TYPE: ICD-10-CM

## 2025-09-04 PROCEDURE — 3080F DIAST BP >= 90 MM HG: CPT | Performed by: INTERNAL MEDICINE

## 2025-09-04 PROCEDURE — 99214 OFFICE O/P EST MOD 30 MIN: CPT | Performed by: INTERNAL MEDICINE

## 2025-09-04 PROCEDURE — 3077F SYST BP >= 140 MM HG: CPT | Performed by: INTERNAL MEDICINE

## 2025-09-04 PROCEDURE — 93000 ELECTROCARDIOGRAM COMPLETE: CPT | Performed by: INTERNAL MEDICINE

## 2025-09-04 RX ORDER — LOSARTAN POTASSIUM 50 MG/1
50 TABLET ORAL DAILY
Qty: 90 TABLET | Refills: 2 | Status: SHIPPED | OUTPATIENT
Start: 2025-09-04

## 2025-09-04 RX ORDER — CARVEDILOL 12.5 MG/1
12.5 TABLET ORAL 2 TIMES DAILY
Qty: 180 TABLET | Refills: 2 | Status: SHIPPED | OUTPATIENT
Start: 2025-09-04

## 2025-09-04 ASSESSMENT — PATIENT HEALTH QUESTIONNAIRE - PHQ9
SUM OF ALL RESPONSES TO PHQ QUESTIONS 1-9: 0
SUM OF ALL RESPONSES TO PHQ QUESTIONS 1-9: 0
1. LITTLE INTEREST OR PLEASURE IN DOING THINGS: NOT AT ALL
SUM OF ALL RESPONSES TO PHQ QUESTIONS 1-9: 0
SUM OF ALL RESPONSES TO PHQ QUESTIONS 1-9: 0
2. FEELING DOWN, DEPRESSED OR HOPELESS: NOT AT ALL

## 2025-09-04 ASSESSMENT — LIFESTYLE VARIABLES
HOW OFTEN DO YOU HAVE A DRINK CONTAINING ALCOHOL: NEVER
HOW MANY STANDARD DRINKS CONTAINING ALCOHOL DO YOU HAVE ON A TYPICAL DAY: PATIENT DOES NOT DRINK